# Patient Record
Sex: MALE | Race: WHITE | NOT HISPANIC OR LATINO | Employment: OTHER | URBAN - METROPOLITAN AREA
[De-identification: names, ages, dates, MRNs, and addresses within clinical notes are randomized per-mention and may not be internally consistent; named-entity substitution may affect disease eponyms.]

---

## 2021-08-11 ENCOUNTER — APPOINTMENT (EMERGENCY)
Dept: RADIOLOGY | Facility: HOSPITAL | Age: 71
End: 2021-08-11
Payer: COMMERCIAL

## 2021-08-11 ENCOUNTER — HOSPITAL ENCOUNTER (EMERGENCY)
Facility: HOSPITAL | Age: 71
Discharge: LEFT BEFORE DISCHARGE | End: 2021-08-11
Attending: EMERGENCY MEDICINE
Payer: COMMERCIAL

## 2021-08-11 VITALS
RESPIRATION RATE: 15 BRPM | OXYGEN SATURATION: 91 % | TEMPERATURE: 99 F | WEIGHT: 185 LBS | DIASTOLIC BLOOD PRESSURE: 94 MMHG | HEART RATE: 92 BPM | SYSTOLIC BLOOD PRESSURE: 175 MMHG

## 2021-08-11 DIAGNOSIS — R53.1 GENERALIZED WEAKNESS: Primary | ICD-10-CM

## 2021-08-11 DIAGNOSIS — E86.0 DEHYDRATION: ICD-10-CM

## 2021-08-11 LAB
ALBUMIN SERPL BCP-MCNC: 3.5 G/DL (ref 3.5–5)
ALP SERPL-CCNC: 118 U/L (ref 46–116)
ALT SERPL W P-5'-P-CCNC: 101 U/L (ref 12–78)
ANION GAP SERPL CALCULATED.3IONS-SCNC: 14 MMOL/L (ref 4–13)
AST SERPL W P-5'-P-CCNC: 46 U/L (ref 5–45)
ATRIAL RATE: 94 BPM
ATRIAL RATE: 95 BPM
BACTERIA UR QL AUTO: NORMAL /HPF
BASOPHILS # BLD AUTO: 0.1 THOUSANDS/ΜL (ref 0–0.1)
BASOPHILS NFR BLD AUTO: 1 % (ref 0–1)
BILIRUB SERPL-MCNC: 0.87 MG/DL (ref 0.2–1)
BILIRUB UR QL STRIP: NEGATIVE
BUN SERPL-MCNC: 33 MG/DL (ref 5–25)
CALCIUM SERPL-MCNC: 9.1 MG/DL (ref 8.3–10.1)
CHLORIDE SERPL-SCNC: 94 MMOL/L (ref 100–108)
CLARITY UR: CLEAR
CO2 SERPL-SCNC: 20 MMOL/L (ref 21–32)
COLOR UR: YELLOW
CREAT SERPL-MCNC: 2.1 MG/DL (ref 0.6–1.3)
EOSINOPHIL # BLD AUTO: 0.14 THOUSAND/ΜL (ref 0–0.61)
EOSINOPHIL NFR BLD AUTO: 2 % (ref 0–6)
ERYTHROCYTE [DISTWIDTH] IN BLOOD BY AUTOMATED COUNT: 12.6 % (ref 11.6–15.1)
GFR SERPL CREATININE-BSD FRML MDRD: 31 ML/MIN/1.73SQ M
GLUCOSE SERPL-MCNC: 460 MG/DL (ref 65–140)
GLUCOSE SERPL-MCNC: 466 MG/DL (ref 65–140)
GLUCOSE UR STRIP-MCNC: ABNORMAL MG/DL
HCT VFR BLD AUTO: 42.9 % (ref 36.5–49.3)
HGB BLD-MCNC: 15.2 G/DL (ref 12–17)
HGB UR QL STRIP.AUTO: NEGATIVE
HYALINE CASTS #/AREA URNS LPF: NORMAL /LPF
IMM GRANULOCYTES # BLD AUTO: 0.07 THOUSAND/UL (ref 0–0.2)
IMM GRANULOCYTES NFR BLD AUTO: 1 % (ref 0–2)
KETONES UR STRIP-MCNC: NEGATIVE MG/DL
LEUKOCYTE ESTERASE UR QL STRIP: ABNORMAL
LYMPHOCYTES # BLD AUTO: 2.28 THOUSANDS/ΜL (ref 0.6–4.47)
LYMPHOCYTES NFR BLD AUTO: 25 % (ref 14–44)
MCH RBC QN AUTO: 32 PG (ref 26.8–34.3)
MCHC RBC AUTO-ENTMCNC: 35.4 G/DL (ref 31.4–37.4)
MCV RBC AUTO: 90 FL (ref 82–98)
MONOCYTES # BLD AUTO: 0.5 THOUSAND/ΜL (ref 0.17–1.22)
MONOCYTES NFR BLD AUTO: 5 % (ref 4–12)
NEUTROPHILS # BLD AUTO: 6.13 THOUSANDS/ΜL (ref 1.85–7.62)
NEUTS SEG NFR BLD AUTO: 66 % (ref 43–75)
NITRITE UR QL STRIP: NEGATIVE
NON-SQ EPI CELLS URNS QL MICRO: NORMAL /HPF
NRBC BLD AUTO-RTO: 0 /100 WBCS
P AXIS: 80 DEGREES
P AXIS: 86 DEGREES
PH UR STRIP.AUTO: 5.5 [PH] (ref 4.5–8)
PLATELET # BLD AUTO: 290 THOUSANDS/UL (ref 149–390)
PMV BLD AUTO: 10.1 FL (ref 8.9–12.7)
POTASSIUM SERPL-SCNC: 4.2 MMOL/L (ref 3.5–5.3)
PR INTERVAL: 202 MS
PR INTERVAL: 204 MS
PROT SERPL-MCNC: 7.5 G/DL (ref 6.4–8.2)
PROT UR STRIP-MCNC: ABNORMAL MG/DL
QRS AXIS: -69 DEGREES
QRS AXIS: -73 DEGREES
QRSD INTERVAL: 118 MS
QRSD INTERVAL: 120 MS
QT INTERVAL: 376 MS
QT INTERVAL: 378 MS
QTC INTERVAL: 472 MS
QTC INTERVAL: 472 MS
RBC # BLD AUTO: 4.75 MILLION/UL (ref 3.88–5.62)
RBC #/AREA URNS AUTO: NORMAL /HPF
SODIUM SERPL-SCNC: 128 MMOL/L (ref 136–145)
SP GR UR STRIP.AUTO: 1.01 (ref 1–1.03)
T WAVE AXIS: 49 DEGREES
T WAVE AXIS: 53 DEGREES
TROPONIN I SERPL-MCNC: <0.02 NG/ML
UROBILINOGEN UR QL STRIP.AUTO: 0.2 E.U./DL
VENTRICULAR RATE: 94 BPM
VENTRICULAR RATE: 95 BPM
WBC # BLD AUTO: 9.22 THOUSAND/UL (ref 4.31–10.16)
WBC #/AREA URNS AUTO: NORMAL /HPF

## 2021-08-11 PROCEDURE — 84484 ASSAY OF TROPONIN QUANT: CPT | Performed by: EMERGENCY MEDICINE

## 2021-08-11 PROCEDURE — 99285 EMERGENCY DEPT VISIT HI MDM: CPT

## 2021-08-11 PROCEDURE — 80053 COMPREHEN METABOLIC PANEL: CPT

## 2021-08-11 PROCEDURE — 71046 X-RAY EXAM CHEST 2 VIEWS: CPT

## 2021-08-11 PROCEDURE — 93010 ELECTROCARDIOGRAM REPORT: CPT | Performed by: INTERNAL MEDICINE

## 2021-08-11 PROCEDURE — 81001 URINALYSIS AUTO W/SCOPE: CPT

## 2021-08-11 PROCEDURE — 96360 HYDRATION IV INFUSION INIT: CPT

## 2021-08-11 PROCEDURE — 93005 ELECTROCARDIOGRAM TRACING: CPT

## 2021-08-11 PROCEDURE — 96361 HYDRATE IV INFUSION ADD-ON: CPT

## 2021-08-11 PROCEDURE — 99285 EMERGENCY DEPT VISIT HI MDM: CPT | Performed by: EMERGENCY MEDICINE

## 2021-08-11 PROCEDURE — 85025 COMPLETE CBC W/AUTO DIFF WBC: CPT

## 2021-08-11 PROCEDURE — 36415 COLL VENOUS BLD VENIPUNCTURE: CPT

## 2021-08-11 PROCEDURE — 82948 REAGENT STRIP/BLOOD GLUCOSE: CPT

## 2021-08-11 RX ORDER — NEBIVOLOL 10 MG/1
10 TABLET ORAL DAILY
COMMUNITY

## 2021-08-11 RX ADMIN — SODIUM CHLORIDE 1000 ML: 0.9 INJECTION, SOLUTION INTRAVENOUS at 17:01

## 2021-08-11 NOTE — ED PROVIDER NOTES
History  Chief Complaint   Patient presents with    Weakness - Generalized     Patient was at [facility] and started to feel weak and dizzy  Patient denies any dizziness now but continues to feel week  70M PMH CAD x2 stents, type 2 diabetes presents to the emergency department after an episode of lightheadedness and diaphoresis  He reports he is at [facility] around 3:00 p m , had some ice cream, and then walked up a hill  While walking up hill he became sweaty, generally weak, lightheaded, and was dry heaving without emesis  He denied chest pain, palpitations, shortness of breath, or syncope  He was brought to the emergency department by ambulance  On arrival to the emergency department his fingerstick sugar was 460  Currently most of his symptoms have resolved and he only feels sweaty and a mild headache  He reports this morning his sugar was 240 which is typical for him and he took 20 units of insulin Humalog at the time  Prior to Admission Medications   Prescriptions Last Dose Informant Patient Reported? Taking? LISINOPRIL PO   Yes Yes   Sig: Take 50 mg by mouth daily   nebivolol (BYSTOLIC) 20 MG tablet   Yes Yes   Sig: Take 20 mg by mouth daily      Facility-Administered Medications: None       Past Medical History:   Diagnosis Date    Hypertension        History reviewed  No pertinent surgical history  History reviewed  No pertinent family history  I have reviewed and agree with the history as documented  E-Cigarette/Vaping     E-Cigarette/Vaping Substances     Social History     Tobacco Use    Smoking status: Never Smoker    Smokeless tobacco: Never Used   Substance Use Topics    Alcohol use: Yes     Comment: occasionally    Drug use: Not Currently        Review of Systems   Constitutional: Positive for diaphoresis  Respiratory: Negative for shortness of breath  Cardiovascular: Negative for chest pain and palpitations     Gastrointestinal: Positive for nausea (Resolved) and vomiting (Dry heaving, resolved)  Negative for abdominal pain and diarrhea  Genitourinary: Negative for dysuria  Musculoskeletal: Negative for myalgias  Neurological: Positive for weakness (Generalized) and headaches (Mild)  Negative for dizziness, syncope, light-headedness (Earlier but resolved) and numbness  Psychiatric/Behavioral: Negative for confusion  Physical Exam  ED Triage Vitals [08/11/21 1623]   Temperature Pulse Respirations Blood Pressure SpO2   99 °F (37 2 °C) 104 20 147/96 96 %      Temp Source Heart Rate Source Patient Position - Orthostatic VS BP Location FiO2 (%)   Oral Monitor Lying Right arm --      Pain Score       --             Orthostatic Vital Signs  Vitals:    08/11/21 1623 08/11/21 1715 08/11/21 1900   BP: 147/96 152/87 (!) 175/94   Pulse: 104 100 92   Patient Position - Orthostatic VS: Lying Lying Lying       Physical Exam  Vitals and nursing note reviewed  Constitutional:       General: He is not in acute distress  Appearance: Normal appearance  HENT:      Head: Normocephalic and atraumatic  Mouth/Throat:      Mouth: Mucous membranes are dry  Eyes:      Conjunctiva/sclera: Conjunctivae normal       Pupils: Pupils are equal, round, and reactive to light  Cardiovascular:      Rate and Rhythm: Normal rate and regular rhythm  Heart sounds: Normal heart sounds  No murmur heard  Pulmonary:      Effort: Pulmonary effort is normal  No respiratory distress  Breath sounds: Normal breath sounds  No rhonchi or rales  Abdominal:      General: Abdomen is flat  Bowel sounds are normal  There is no distension  Palpations: Abdomen is soft  Tenderness: There is no abdominal tenderness  Skin:     General: Skin is warm and dry  Neurological:      General: No focal deficit present  Mental Status: He is alert     Psychiatric:         Mood and Affect: Mood normal          ED Medications  Medications   sodium chloride 0 9 % bolus 1,000 mL (0 mL Intravenous Stopped 8/11/21 1900)       Diagnostic Studies  Results Reviewed     Procedure Component Value Units Date/Time    Comprehensive metabolic panel [[id number]]  (Abnormal) Collected: 08/11/21 1701    Lab Status: Final result Specimen: Blood from Arm, Left Updated: 08/11/21 1840     Sodium 128 mmol/L      Potassium 4 2 mmol/L      Chloride 94 mmol/L      CO2 20 mmol/L      ANION GAP 14 mmol/L      BUN 33 mg/dL      Creatinine 2 10 mg/dL      Glucose 466 mg/dL      Calcium 9 1 mg/dL      AST 46 U/L       U/L      Alkaline Phosphatase 118 U/L      Total Protein 7 5 g/dL      Albumin 3 5 g/dL      Total Bilirubin 0 87 mg/dL      eGFR 31 ml/min/1 73sq m     Narrative:      [place] guidelines for Chronic Kidney Disease (CKD):     Stage 1 with normal or high GFR (GFR > 90 mL/min/1 73 square meters)    Stage 2 Mild CKD (GFR = 60-89 mL/min/1 73 square meters)    Stage 3A Moderate CKD (GFR = 45-59 mL/min/1 73 square meters)    Stage 3B Moderate CKD (GFR = 30-44 mL/min/1 73 square meters)    Stage 4 Severe CKD (GFR = 15-29 mL/min/1 73 square meters)    Stage 5 End Stage CKD (GFR <15 mL/min/1 73 square meters)  Note: GFR calculation is accurate only with a steady state creatinine    Troponin I [[id number]]  (Normal) Collected: 08/11/21 1721    Lab Status: Final result Specimen: Blood from Arm, Left Updated: 08/11/21 1818     Troponin I <0 02 ng/mL     Urine Microscopic [[id number]]  (Normal) Collected: 08/11/21 1749    Lab Status: Final result Specimen: Urine, Clean Catch Updated: 08/11/21 1803     RBC, UA None Seen /hpf      WBC, UA None Seen /hpf      Epithelial Cells None Seen /hpf      Bacteria, UA None Seen /hpf      Hyaline Casts, UA None Seen /lpf     Urine Macroscopic, POC [[id number]]  (Abnormal) Collected: 08/11/21 1749    Lab Status: Final result Specimen: Urine Updated: 08/11/21 1751     Color, UA Yellow     Clarity, UA Clear     pH, UA 5 5     Leukocytes, UA Elevated glucose may cause decreased leukocyte values   See urine microscopic for [facility] result/     Nitrite, UA Negative     Protein, UA 30 (1+) mg/dl      Glucose, UA >=1000 (1%) mg/dl      Ketones, UA Negative mg/dl      Urobilinogen, UA 0 2 E U /dl      Bilirubin, UA Negative     Blood, UA Negative     Specific Gravity, UA 1 015    Narrative:      CLINITEK RESULT    CBC and differential [[id number]] Collected: 08/11/21 1701    Lab Status: Final result Specimen: Blood from Arm, Left Updated: 08/11/21 1714     WBC 9 22 Thousand/uL      RBC 4 75 Million/uL      Hemoglobin 15 2 g/dL      Hematocrit 42 9 %      MCV 90 fL      MCH 32 0 pg      MCHC 35 4 g/dL      RDW 12 6 %      MPV 10 1 fL      Platelets 969 Thousands/uL      nRBC 0 /100 WBCs      Neutrophils Relative 66 %      Immat GRANS % 1 %      Lymphocytes Relative 25 %      Monocytes Relative 5 %      Eosinophils Relative 2 %      Basophils Relative 1 %      Neutrophils Absolute 6 13 Thousands/µL      Immature Grans Absolute 0 07 Thousand/uL      Lymphocytes Absolute 2 28 Thousands/µL      Monocytes Absolute 0 50 Thousand/µL      Eosinophils Absolute 0 14 Thousand/µL      Basophils Absolute 0 10 Thousands/µL     Fingerstick Glucose (POCT) [[id number]]  (Abnormal) Collected: 08/11/21 1622    Lab Status: Final result Updated: 08/11/21 1627     POC Glucose 460 mg/dl                  XR chest 2 views    (Results Pending)         Procedures  ECG 12 Lead Documentation Only    Date/Time: 8/11/2021 5:35 PM  Performed by: Ludwig Almanzar MD  Authorized by: Ludwig Almanzar MD     ECG reviewed by me, the ED Provider: yes    Patient location:  ED  Previous ECG:     Previous ECG:  Unavailable  Interpretation:     Interpretation: abnormal    Rate:     ECG rate:  95    ECG rate assessment: normal    Rhythm:     Rhythm: sinus rhythm    Ectopy:     Ectopy: none    QRS:     QRS axis:  Left    QRS intervals:  Normal  ST segments:     ST segments:  Normal  T waves: T waves: non-specific and inverted      Inverted:  AVR and V1  Other findings:     Other findings comment:  RBB pattern, LAFB          ED Course               Identification of Seniors at Risk      Most Recent Value   (ISAR) Identification of Seniors at Risk   Before the illness or injury that brought you to the Emergency, did you need someone to help you on a regular basis? 0 Filed at: 08/11/2021 1624   In the last 24 hours, have you needed more help than usual?  0 Filed at: 08/11/2021 1624   Have you been hospitalized for one or more nights during the past 6 months? 0 Filed at: 08/11/2021 1624   In general, do you see well?  0 Filed at: 08/11/2021 1624   In general, do you have serious problems with your memory? 0 Filed at: 08/11/2021 1624   Do you take more than three different medications every day? 1 Filed at: 08/11/2021 1624   ISAR Score  1 Filed at: 08/11/2021 1624                    SBIRT 20yo+      Most Recent Value   SBIRT (25 yo +)   In order to provide better care to our patients, we are screening all of our patients for alcohol and drug use  Would it be okay to ask you these screening questions? No Filed at: 08/11/2021 1625                MDM  Number of Diagnoses or Management Options  Dehydration  Generalized weakness  Diagnosis management comments: 70M PMH CAD x2 stents, type 2 diabetes presents to the emergency department after an episode of lightheadedness and diaphoresis, found to have blood glucose of 460  Differential including hyperosmolar hyperglycemic nonketotic state, cardiac syncope, heat stroke  On arrival patient with mild headache and sweating, other symptoms resolved  Denied chest pain, shortness of breath, loss of consciousness  Workup including vital signs, physical exam, EKG, troponin, CXR, CMP, CBC, UA  EKG without acute ischemic changes, repeat EKG without changes, troponin negative, chest x-ray without acute abnormalities    Found to have NATHANIEL, elevated glucose, hypoxia with oxygen saturation in the high 80s to low 90s  Treatment including IV fluids  Patient declined further evaluation or admission and chose to leave AMA  Risks of leaving AMA discussed, patient understood and signed AMA form  Disposition  Final diagnoses:   Generalized weakness   Dehydration     Time reflects when diagnosis was documented in both MDM as applicable and the Disposition within this note     Time User Action Codes Description Comment    8/11/2021  7:27 PM Donny Salazar Add [R53 1] Weakness     8/11/2021  7:27 PM Francesca Salazar Add [R53 1] Generalized weakness     8/11/2021  7:27 PM Donny Salazar Modify [R53 1] Generalized weakness     8/11/2021  7:27 PM Francesca Salazar Remove [R53 1] Weakness     8/11/2021  7:27 PM Arnolder Goldberg Add [E86 0] Dehydration       ED Disposition     ED Disposition Condition Date/Time Comment    AMA  Wed Aug 11, 2021  7:03 PM Date: 8/11/2021  Patient: Adriel Knutson  Admitted: 8/11/2021  4:17 PM  Attending Provider: Jose Riojas MD    Adriel Knutson or his authorized caregiver has made the decision for the patient to leave the emergency department against the advi ce of the emergency department staff  He or his authorized caregiver has been informed and understands the inherent risks, including death, disability, worsening hypoxia, worsening kidney injury, or arrhythmia  He or his authorized caregiver has dec ided to accept the responsibility for this decision  Adriel Knutson and all necessary parties have been advised that he may return for further evaluation or treatment  His condition at time of discharge was stable    Adriel Knutson had current vital s igns as follows:  /87 (BP Location: Right arm)   Pulse 100   Temp 99 °F (37 2 °C) (Oral)   Resp 20   Wt 83 9 kg (185 lb)         Follow-up Information    None         Discharge Medication List as of 8/11/2021  7:03 PM      CONTINUE these medications which have NOT CHANGED    Details   LISINOPRIL PO Take 50 mg by mouth daily, Historical Med      nebivolol (BYSTOLIC) 20 MG tablet Take 20 mg by mouth daily, Historical Med           No discharge procedures on file  PDMP Review     None           ED Provider  Attending physically available and evaluated Tutu Esqueda I managed the patient along with the ED Attending      Electronically Signed by         Christina Vick MD  08/12/21 1891

## 2021-08-11 NOTE — DISCHARGE INSTRUCTIONS
You were evaluated in the emergency department for an episode of weakness and lightheadedness  You may return to the emergency department at any time if you would like further evaluation  Return to the emergency department immediately if having altered mental status, passing out, chest pain, shortness of breath, abdominal pain, worsened vomiting, inability to tolerate foods, or any other symptoms that concern you

## 2021-09-01 ENCOUNTER — HOSPITAL ENCOUNTER (OUTPATIENT)
Facility: HOSPITAL | Age: 71
Setting detail: HOSPITAL OUTPATIENT SURGERY
End: 2021-09-01
Attending: ORTHOPAEDIC SURGERY | Admitting: ORTHOPAEDIC SURGERY
Payer: COMMERCIAL

## 2021-09-13 ENCOUNTER — APPOINTMENT (OUTPATIENT)
Dept: PREADMISSION TESTING | Facility: HOSPITAL | Age: 71
End: 2021-09-13
Attending: ORTHOPAEDIC SURGERY
Payer: COMMERCIAL

## 2021-09-13 ENCOUNTER — TRANSCRIBE ORDERS (OUTPATIENT)
Dept: LAB | Facility: HOSPITAL | Age: 71
End: 2021-09-13
Payer: COMMERCIAL

## 2021-09-13 VITALS
OXYGEN SATURATION: 98 % | WEIGHT: 182.8 LBS | SYSTOLIC BLOOD PRESSURE: 150 MMHG | DIASTOLIC BLOOD PRESSURE: 98 MMHG | BODY MASS INDEX: 26.17 KG/M2 | HEART RATE: 93 BPM | HEIGHT: 70 IN | RESPIRATION RATE: 18 BRPM | TEMPERATURE: 96.2 F

## 2021-09-13 DIAGNOSIS — M54.12 RADICULOPATHY, CERVICAL REGION: ICD-10-CM

## 2021-09-13 DIAGNOSIS — I10 ESSENTIAL HYPERTENSION: ICD-10-CM

## 2021-09-13 DIAGNOSIS — Z01.818 ENCOUNTER FOR OTHER PREPROCEDURAL EXAMINATION: ICD-10-CM

## 2021-09-13 DIAGNOSIS — Z01.818 ENCOUNTER FOR OTHER PREPROCEDURAL EXAMINATION: Primary | ICD-10-CM

## 2021-09-13 DIAGNOSIS — Z11.59 ENCOUNTER FOR SCREENING FOR OTHER VIRAL DISEASES: ICD-10-CM

## 2021-09-13 DIAGNOSIS — M48.02 SPINAL STENOSIS, CERVICAL REGION: ICD-10-CM

## 2021-09-13 DIAGNOSIS — E11.69 TYPE 2 DIABETES MELLITUS WITH OTHER SPECIFIED COMPLICATION, WITH LONG-TERM CURRENT USE OF INSULIN: ICD-10-CM

## 2021-09-13 DIAGNOSIS — M54.12 RADICULOPATHY, CERVICAL REGION: Primary | ICD-10-CM

## 2021-09-13 DIAGNOSIS — Z79.4 TYPE 2 DIABETES MELLITUS WITH OTHER SPECIFIED COMPLICATION, WITH LONG-TERM CURRENT USE OF INSULIN: ICD-10-CM

## 2021-09-13 PROBLEM — Z86.73 HX OF TRANSIENT ISCHEMIC ATTACK (TIA): Status: ACTIVE | Noted: 2021-09-13

## 2021-09-13 PROBLEM — E11.9 TYPE 2 DIABETES MELLITUS, WITH LONG-TERM CURRENT USE OF INSULIN (CMS/HCC): Status: ACTIVE | Noted: 2021-09-13

## 2021-09-13 PROBLEM — Z86.79 HISTORY OF CORONARY ARTERY DISEASE: Status: ACTIVE | Noted: 2021-09-13

## 2021-09-13 PROBLEM — Z87.448 HX OF CHRONIC KIDNEY DISEASE: Status: ACTIVE | Noted: 2021-09-13

## 2021-09-13 LAB
ABO + RH BLD: NORMAL
ALBUMIN SERPL-MCNC: 4.5 G/DL (ref 3.4–5)
ALP SERPL-CCNC: 69 IU/L (ref 35–126)
ALT SERPL-CCNC: 15 IU/L (ref 16–63)
ANION GAP SERPL CALC-SCNC: 10 MEQ/L (ref 3–15)
AST SERPL-CCNC: 14 IU/L (ref 15–41)
BASOPHILS # BLD: 0.09 K/UL (ref 0.01–0.1)
BASOPHILS NFR BLD: 1 %
BILIRUB SERPL-MCNC: 1 MG/DL (ref 0.3–1.2)
BLD GP AB SCN SERPL QL: NEGATIVE
BLOOD BANK CMNT PATIENT-IMP: NORMAL
BUN SERPL-MCNC: 27 MG/DL (ref 8–20)
CALCIUM SERPL-MCNC: 9.9 MG/DL (ref 8.9–10.3)
CHLORIDE SERPL-SCNC: 95 MEQ/L (ref 98–109)
CO2 SERPL-SCNC: 28 MEQ/L (ref 22–32)
CREAT SERPL-MCNC: 1.4 MG/DL (ref 0.8–1.3)
D AG BLD QL: POSITIVE
DIFFERENTIAL METHOD BLD: ABNORMAL
EOSINOPHIL # BLD: 0.22 K/UL (ref 0.04–0.54)
EOSINOPHIL NFR BLD: 2.4 %
ERYTHROCYTE [DISTWIDTH] IN BLOOD BY AUTOMATED COUNT: 13 % (ref 11.6–14.4)
EST. AVERAGE GLUCOSE BLD GHB EST-MCNC: 258 MG/DL
GFR SERPL CREATININE-BSD FRML MDRD: 50.1 ML/MIN/1.73M*2
GLUCOSE SERPL-MCNC: 336 MG/DL (ref 70–99)
HBA1C MFR BLD HPLC: 10.6 %
HCT VFR BLDCO AUTO: 43 % (ref 40.1–51)
HGB BLD-MCNC: 14.6 G/DL (ref 13.7–17.5)
IMM GRANULOCYTES # BLD AUTO: 0.1 K/UL (ref 0–0.08)
IMM GRANULOCYTES NFR BLD AUTO: 1.1 %
LABORATORY COMMENT REPORT: NORMAL
LYMPHOCYTES # BLD: 1.58 K/UL (ref 1.2–3.5)
LYMPHOCYTES NFR BLD: 17.1 %
MCH RBC QN AUTO: 31.5 PG (ref 28–33.2)
MCHC RBC AUTO-ENTMCNC: 34 G/DL (ref 32.2–36.5)
MCV RBC AUTO: 92.9 FL (ref 83–98)
MONOCYTES # BLD: 0.49 K/UL (ref 0.3–1)
MONOCYTES NFR BLD: 5.3 %
NEUTROPHILS # BLD: 6.74 K/UL (ref 1.7–7)
NEUTS SEG NFR BLD: 73.1 %
NRBC BLD-RTO: 0 %
PDW BLD AUTO: 9.7 FL (ref 9.4–12.4)
PLATELET # BLD AUTO: 290 K/UL (ref 150–350)
POTASSIUM SERPL-SCNC: 4.6 MEQ/L (ref 3.6–5.1)
PROT SERPL-MCNC: 7.3 G/DL (ref 6–8.2)
RBC # BLD AUTO: 4.63 M/UL (ref 4.5–5.8)
SARS-COV-2 RNA RESP QL NAA+PROBE: NEGATIVE
SODIUM SERPL-SCNC: 133 MEQ/L (ref 136–144)
SPECIMEN EXP DATE BLD: NORMAL
WBC # BLD AUTO: 9.22 K/UL (ref 3.8–10.5)

## 2021-09-13 PROCEDURE — 86901 BLOOD TYPING SEROLOGIC RH(D): CPT

## 2021-09-13 PROCEDURE — 36415 COLL VENOUS BLD VENIPUNCTURE: CPT

## 2021-09-13 PROCEDURE — 83036 HEMOGLOBIN GLYCOSYLATED A1C: CPT | Performed by: HOSPITALIST

## 2021-09-13 PROCEDURE — U0003 INFECTIOUS AGENT DETECTION BY NUCLEIC ACID (DNA OR RNA); SEVERE ACUTE RESPIRATORY SYNDROME CORONAVIRUS 2 (SARS-COV-2) (CORONAVIRUS DISEASE [COVID-19]), AMPLIFIED PROBE TECHNIQUE, MAKING USE OF HIGH THROUGHPUT TECHNOLOGIES AS DESCRIBED BY CMS-2020-01-R: HCPCS

## 2021-09-13 PROCEDURE — 99203 OFFICE O/P NEW LOW 30 MIN: CPT | Performed by: HOSPITALIST

## 2021-09-13 PROCEDURE — 80053 COMPREHEN METABOLIC PANEL: CPT

## 2021-09-13 PROCEDURE — 85025 COMPLETE CBC W/AUTO DIFF WBC: CPT

## 2021-09-13 RX ORDER — CALCIUM CARBONATE/VITAMIN D3 500-10/5ML
400 LIQUID (ML) ORAL 2 TIMES DAILY
COMMUNITY

## 2021-09-13 RX ORDER — INSULIN ASPART 100 [IU]/ML
10 INJECTION, SOLUTION INTRAVENOUS; SUBCUTANEOUS
COMMUNITY

## 2021-09-13 RX ORDER — SIMVASTATIN 20 MG/1
20 TABLET, FILM COATED ORAL NIGHTLY
COMMUNITY

## 2021-09-13 RX ORDER — INSULIN GLARGINE 100 [IU]/ML
22 INJECTION, SOLUTION SUBCUTANEOUS NIGHTLY
COMMUNITY

## 2021-09-13 RX ORDER — LOSARTAN POTASSIUM 50 MG/1
50 TABLET ORAL NIGHTLY
COMMUNITY

## 2021-09-13 RX ORDER — EZETIMIBE 10 MG/1
10 TABLET ORAL NIGHTLY
COMMUNITY

## 2021-09-13 RX ORDER — ASPIRIN 81 MG/1
81 TABLET ORAL NIGHTLY
COMMUNITY

## 2021-09-13 RX ORDER — NEBIVOLOL 10 MG/1
10 TABLET ORAL EVERY MORNING
COMMUNITY

## 2021-09-13 RX ORDER — HYDROCODONE BITARTRATE AND ACETAMINOPHEN 5; 325 MG/1; MG/1
1 TABLET ORAL EVERY 6 HOURS PRN
COMMUNITY

## 2021-09-13 RX ORDER — POTASSIUM CHLORIDE 20 MEQ/1
20 TABLET, EXTENDED RELEASE ORAL 2 TIMES DAILY
COMMUNITY

## 2021-09-13 RX ORDER — FLUTICASONE PROPIONATE 50 MCG
1 SPRAY, SUSPENSION (ML) NASAL DAILY PRN
COMMUNITY

## 2021-09-13 RX ORDER — ICOSAPENT ETHYL 1 G/1
1 CAPSULE ORAL 3 TIMES DAILY
COMMUNITY

## 2021-09-13 ASSESSMENT — PAIN SCALES - GENERAL: PAINLEVEL: 8

## 2021-09-13 NOTE — ASSESSMENT & PLAN NOTE
On losartan and Bystolic  Patient instructed to hold his losartan the morning of surgery  Continue Bystolic pre and postop.  Monitor blood pressures -mildly elevated this visit

## 2021-09-13 NOTE — CONSULTS
[facility] Medicine Service -  Pre-Operative Consultation       Patient Name: Jake Conde  Referring Surgeon: Dr. Yogesh Ponce    Reason for Referral: Pre-Operative Evaluation  Surgical Procedure: C3-6 Anterior Decompression, Instrumented Fusion, Autograft, Allograft, Neuromonitoring  Operative Date: 9/16/2021  Other Providers:      PCP: Danielle Philippe DO          HISTORY OF PRESENT ILLNESS      Jake Conde is a 70 y.o. male presenting today to the [facility] at [facility] for pre-operative evaluation prior to planned surgery.    Complains of significant neck pain, paresthesias since a traumatic injury in May 2021. No relief with conservative measures so opting for surgery    In regards to medical history:      The patient denies any current or recent chest pain or pressure, dyspnea, cough, sputum, fevers, chills, abdominal pain, nausea, vomiting, diarrhea or other symptoms.     Functionally, the patient is able to ascend a flight or so of stairs with no dyspnea or chest pain. No urinary symptoms, no BRBPR or melena    The patient denies, on specific questioning, the following:  No history of MI, arrhythmia,or CHF. Hx of CAD  No history of LEXII.  No history of DVT/PE.  No history of COPD.  Hx of TIA  history of DM.   history of CKD.     PAST MEDICAL AND SURGICAL HISTORY      Past Medical History:   Diagnosis Date   • Cervical disc disorder    • Chronic kidney disease    • Coronary artery disease     LAD stents   • COVID-19 2020    not hospitalized-mild sx   • COVID-19 vaccine series completed 2021    x 2- Pfizer   • Environmental and seasonal allergies    • Gout    • Hypertension    • Kidney stones    • Lipid disorder    • Rotator cuff tear     right shoulder- d/t fall 6/2021   • TIA (transient ischemic attack) 2016   • Type 2 diabetes mellitus (CMS/HCC)        Past Surgical History:   Procedure Laterality Date   • ANKLE ARTHROSCOPY Right    • CARPAL  TUNNEL RELEASE Bilateral    • CATARACT EXTRACTION W/  INTRAOCULAR LENS IMPLANT Bilateral    • CHOLECYSTECTOMY     • CORONARY ANGIOPLASTY WITH STENT PLACEMENT  2008    LAD   • ELBOW SURGERY Bilateral     cubital release   • KNEE ARTHROSCOPY Bilateral    • LITHOTRIPSY      x 2   • NASAL FRACTURE SURGERY  06/2021    & orbital fracture (s/p fall)   • SINUS SURGERY     • THUMB ARTHROSCOPY Bilateral    • TONSILLECTOMY     • TOTAL KNEE ARTHROPLASTY Left 2018       MEDICATIONS        Current Outpatient Medications:   •  ALLOPURINOL ORAL, Take 400 mg by mouth nightly., Disp: , Rfl:   •  aspirin 81 mg enteric coated tablet, Take 81 mg by mouth nightly., Disp: , Rfl:   •  ezetimibe (ZETIA) 10 mg tablet, Take 10 mg by mouth nightly., Disp: , Rfl:   •  fluticasone propionate (FLONASE) 50 mcg/actuation nasal spray, Administer 1 spray into each nostril daily as needed for rhinitis., Disp: , Rfl:   •  HYDROcodone-acetaminophen (NORCO) 5-325 mg per tablet, Take 1 tablet by mouth every 6 (six) hours as needed for moderate pain (1-2 tabs evry 4-6 hours as needed)., Disp: , Rfl:   •  icosapent ethyL (VASCEPA) 1 gram capsule, Take 1 g by mouth 4 (four) times a day., Disp: , Rfl:   •  insulin aspart U-100 (NovoLOG) 100 unit/mL (3 mL) pen, Inject 10 Units under the skin 3 (three) times a day before meals. SSI -checks BG before meals, Disp: , Rfl:   •  insulin glargine U-100 (LANTUS SOLOSTAR/BASAGLAR) 100 unit/mL (3 mL) pen, Inject 22 Units under the skin nightly., Disp: , Rfl:   •  losartan (COZAAR) 50 mg tablet, Take 50 mg by mouth nightly., Disp: , Rfl:   •  magnesium oxide 400 mg magnesium capsule, Take 400 mg by mouth 2 (two) times a day., Disp: , Rfl:   •  nebivoloL (BYSTOLIC) 10 mg tablet, Take 10 mg by mouth every morning., Disp: , Rfl:   •  potassium chloride (KLOR-CON) 20 mEq CR tablet, Take 20 mEq by mouth 2 (two) times a day., Disp: , Rfl:   •  simvastatin (ZOCOR) 20 mg tablet, Take 20 mg by mouth nightly., Disp: , Rfl:  "    ALLERGIES      Nitroglycerin    FAMILY HISTORY      family history is not on file.    Denies any prior known family history of DVTs/PEs/clotting disorder    SOCIAL HISTORY      Social History     Tobacco Use   • Smoking status: Former Smoker     Packs/day: 1.50     Years: 5.00     Pack years: 7.50     Types: Cigarettes     Quit date:      Years since quittin.7   • Smokeless tobacco: Never Used   Vaping Use   • Vaping Use: Never used   Substance Use Topics   • Alcohol use: Yes     Comment: rare   • Drug use: Never       REVIEW OF SYSTEMS      All other systems reviewed and negative except as noted in HPI    PHYSICAL EXAMINATION      Visit Vitals  BP (!) 150/98 (BP Location: Left upper arm, Patient Position: Sitting)   Pulse 93   Temp (!) 35.7 °C (96.2 °F) (Temporal)   Resp 18   Ht 1.778 m (5' 10\")   Wt 82.9 kg (182 lb 12.8 oz)   SpO2 98%   BMI 26.23 kg/m²     Body mass index is 26.23 kg/m².    Physical Exam  Constitutional:       Appearance: Normal appearance.   HENT:      Head: Normocephalic and atraumatic.   Eyes:      Conjunctiva/sclera: Conjunctivae normal.   Neck:      Comments: Limited ROM from pain  Cardiovascular:      Rate and Rhythm: Normal rate and regular rhythm.      Heart sounds: Normal heart sounds.   Pulmonary:      Effort: Pulmonary effort is normal.      Breath sounds: Normal breath sounds.   Abdominal:      General: Bowel sounds are normal. There is no distension.      Palpations: Abdomen is soft.      Tenderness: There is no abdominal tenderness.   Skin:     General: Skin is warm and dry.   Neurological:      Mental Status: He is alert and oriented to person, place, and time.   Psychiatric:         Mood and Affect: Mood normal.         Behavior: Behavior normal.         LABS / EKG        Labs  reviewed    Lab Results   Component Value Date     (L) 2021    K 4.6 2021    CL 95 (L) 2021    BUN 27 (H) 2021    CREATININE 1.4 (H) 2021    WBC 9.22 " "09/13/2021    HGB 14.6 09/13/2021    HCT 43.0 09/13/2021     09/13/2021    ALT 15 (L) 09/13/2021    AST 14 (L) 09/13/2021    HGBA1C 10.6 (H) 09/13/2021         ECG/Telemetry  ECG May 2021: Sinus rhythm with first-degree AV block, bifascicular block, cannot rule out inferior infarct age undetermined.  Per cardiology unchanged compared to previous EKG.    ASSESSMENT AND PLAN         Encounter for other preprocedural examination  Reasonable exercise capacity  Does have a history of CAD s/p PCI/stents, strip TIA, insulin-dependent type 2 diabetes  Seen by his cardiologist for preop cardiovascular evaluation and per cardiology \" no unacceptable risk for planned surgery\"    P:  His A1C is 10.6 which is indicative of poorly controlled diabetes.  This puts him at increased risk of perioperative complications such as but not limited to poor wound healing, risk of infection.  Ideally would recommend deferring surgery until A1c less than 8.    Type 2 diabetes mellitus, with long-term current use of insulin (CMS/LTAC, located within [facility])  A1c - 10.6 which is indicative of poorly controlled diabetes.  on Lantus, NovoLog with meals.  Recommend endocrinology follow-up  Patient instructed to take half his usual Lantus dose the night before surgery and hold his mealtime insulin while n.p.o. on the day of surgery.  SSI, monitor Accu-Cheks postop      History of coronary artery disease  With history of PCI/stents  Cardiology recommending continue aspirin through surgery.  Continue beta-blocker, statin.    Hx of chronic kidney disease  creatinine today - 1.4  Recent Baseline creatinine around 1.8 - 2.0  Avoid nephrotoxins  Ensure adequate hydration luis alfredo-/postop.    Hx of transient ischemic attack (TIA)  Cont aspirin, statin    Essential hypertension  On losartan and Bystolic  Patient instructed to hold his losartan the morning of surgery  Continue Bystolic pre and postop.  Monitor blood pressures -mildly elevated this visit       In regards to " perioperative cardiac risk:  The patient denies any history of CHF and does not have a pre-operative creatinine > 2 mg/dL. Does have a hx of CAD s/p PCI, hx of IDDM, hx of TIA  The Revised Cardiac Risk Index (RCRI) for this patient indicates >5.4% risk.     Further comments:  Resume supplements when OK with surgical team.  I would encourage incentive spirometry to assist with minimizing luis alfredo-operative pulmonary risk.  DVT prophylaxis and timing of such per the discretion of the surgeon.     Please do not hesitate to contact [facility] during the upcoming hospitalization with any questions or concerns.     Tobias Mccurdy MD  9/13/2021

## 2021-09-13 NOTE — ASSESSMENT & PLAN NOTE
A1c - 10.6 which is indicative of poorly controlled diabetes.  on Lantus, NovoLog with meals.  Recommend endocrinology follow-up  Patient instructed to take half his usual Lantus dose the night before surgery and hold his mealtime insulin while n.p.o. on the day of surgery.  SSI, monitor Accu-Cheks postop

## 2021-09-13 NOTE — ASSESSMENT & PLAN NOTE
With history of PCI/stents  Cardiology recommending continue aspirin through surgery.  Continue beta-blocker, statin.

## 2021-09-13 NOTE — ASSESSMENT & PLAN NOTE
creatinine today - 1.4  Recent Baseline creatinine around 1.8 - 2.0  Avoid nephrotoxins  Ensure adequate hydration luis alfredo-/postop.

## 2021-09-13 NOTE — ASSESSMENT & PLAN NOTE
"Reasonable exercise capacity  Does have a history of CAD s/p PCI/stents, strip TIA, insulin-dependent type 2 diabetes  Seen by his cardiologist for preop cardiovascular evaluation and per cardiology \" no unacceptable risk for planned surgery\"    P:  His A1C is 10.6 which is indicative of poorly controlled diabetes.  This puts him at increased risk of perioperative complications such as but not limited to poor wound healing, risk of infection.  Ideally would recommend deferring surgery until A1c less than 8.  "

## 2021-09-13 NOTE — PRE-PROCEDURE INSTRUCTIONS
1. Admissions will call you with your arrival time on Wed 9/15/21 between 2pm - 4pm. For questions about your arrival time, please call [phone].    2. Please report to the [facility] in the [place] on the day of your procedure. Enter the hospital through the [place] [facility] (main entrance at [address], [place]). If you are parking in the [facility], come to the ground floor of the garage and follow signs to the [facility]. Bring your insurance card and photo ID.     3. Please follow these fasting guidelines:  - STOP all solid food 8 hours prior to arrival.   - No more than 12oz of water is permitted and must STOP 2 HOURS prior to arrival to the hospital.     4. Early on the morning of the procedure, please take the following medications listed below with a sip of water, in addition to any medications prescribed by your surgeon:     bystolic  flonase if needed  norco if needed      *NO aspirin, ibuprofen, anti-inflammatories, fish oil or Vitamin E unless ordered by physician.       5. Other instructions: antibacterial shower x 2, brush teeth    6. If you develop a cough, cold, fever, or other symptom prior to the date of the procedure, please report it to your physician immediately.    7. If you need to cancel the procedure for any reason, please contact your physician.    8. Make arrangements to have someone drive you home from the procedure. If you have not arranged for transportation home, your surgery may be cancelled.     9. You may not take public transportation or a cab unless accompanied by a responsible person.    10. You may not drive a car or operate complex or potentially dangerous machinery for 24 hours following anesthesia and/or sedation.    11. If it is medically necessary for you to have a longer stay, you will be informed as soon as the decision is made.    12. Do not wear or bring anything of value to the hospital, including medication or jewelry of  any kind. Do not wear make-up or contact lenses. Do bring your glasses and hearing aid, with a case. If you use a CPAP machine and will be overnight, please bring it with you. If you use any inhalers, please bring them as well.     13. Dress in comfortable clothes.    14. If instructed, please bring a copy of your Advance Directive (Living Will/Durable Power of ) on the day of your procedure.    15. Ensuring your safety at all times is a very important part of our [facility] Culture of Safety. After having surgery and sedation, you are at risk for falling and balance issues. Although you may feel awake, the effects of the medication can last up to 24 hours after anesthesia. If you need to use the bathroom during your recovery period, nursing staff will escort you there and stay with you to ensure your safety.    Pre operative instructions given as per protocol.  Form explained by:     Eusebia Reyes, RN, BSN

## 2021-09-14 NOTE — DISCHARGE INSTRUCTIONS
Yogesh Ponce MD  , Orthopaedic Spine Surgery  [facility]  Alaina Beasley PA-C  Office: [phone]  Fax: [phone]    Anterior Cervical Diskectomy and Fusion (ACDF)  Preoperative, Postoperative and Home Recovery Instructions   Introduction  The purpose of this guide is to provide you and your family with information regarding your medical condition and planned surgery. This information is part of your medical Informed Consent. Please read it and follow the advice carefully. You should retain the guide for future reference and bring it with you to office appointments and to the hospital for reference.  Family Waiting  After surgery, Dr. Ponce will meet with your family in the surgical waiting room unless otherwise arranged. Please have a representative available in the waiting room to gather the family upon completion of surgery.  After Your Operation  Pain  After surgery you may experience pain in the region of the incision. Some neck and arm pain as well as tingling or numbness may also be present. Initially it may be of greater intensity than pre-operatively, but will subside over time as the healing process occurs. This discomfort is caused from surgical retraction of tissue as well as inflammation and swelling of the previously compressed nerves.  Some patients experience a sore throat and swallowing difficulty after general anesthesia and surgery. This is from manipulation of tissue and the presence of the breathing tube for anesthesia. The sore throat usually will subside within a week. The swallowing difficulty may take longer. Using throat lozenges or lemon drops, sipping cool liquids, or sucking ice chips may soothe this pain.  Use of Pain Medication  Narcotic pain medication will be available for pain relief after surgery. Narcotics are very effective for pain relief but may cause other side effects. The possible effects vary among  patients and may include: sleepiness, nausea, constipation, flushing, sweating, and occasionally euphoria or confused feelings. If these occur notify your nurse. For your protection, you will receive narcotic medication only when you request it and if deemed medically appropriate by your physician.  Activity  Feel free to move about in your bed. The nurse or therapist will assist you in getting out of bed for a short walk a few hours after surgery. You will be instructed to be up walking every 2 to 3 hours during the day and evening. As you recover, the nurse will allow you to do this independently once you are steady and feel comfortable.  Early activity after surgery is extremely important to help prevent the complications of prolonged bed rest such as pneumonia and blood clots. It also promotes recovery, relieves muscle stiffness, allows for development of a well-organized scar, and improves your outlook.  Do not start any programs of exercise or physical therapy unless discussed with your doctor.  Diet  Your diet will begin with clear liquids, and be advanced to your normal daily diet as soon as your condition permits. Your IV will be removed as soon as we are reasonably certain it will no longer be required for medications and hydration.  Bowel and Bladder Function  During surgery you may have a catheter (tube) in your bladder to monitor your urine output. Upon its removal you may feel a stinging sensation for 2 to 3 days, which is normal. Some patients may have difficulty urinating after surgery. If this occurs, notify your nurse who may assist you in voiding techniques. This may require placing a catheter in your bladder. After surgery, constipation frequently occurs from inactivity and the side effects of pain medication. Stool softeners and laxatives will be available.  Respiratory Hygiene  Deep breathing is very important after surgery to maintain lung expansion and reduce the risk of pneumonia. You will  be provided with an incentive spirometer and instructed about its use. This device should be used every 15 to 30 minutes during your wakeful hours initially, then every 1 to 2 hours as your  Activity returns to normal. This device is yours to take home. Continue to use it at home for at least 1 week after your discharge.  Smoking is absolutely forbidden. There is clear evidence that smoking dramatically increases your risk of post-operative complications. There is also evidence that smoking adversely effects bone healing and nerve recovery. Second hand smoke also applies, so family members and friends should avoid smoking while around you.  Use of Your Neck Collar  A soft cervical collar will be applied immediately following surgery. It serves as a reminder and keeps your head supported. It also reduces discomfort and facilitates healing. This collar is for comfort only. Feel free to discontinue use after 2-3 days.  The collar should fit snugly, yet comfortably. It should allow only minimal motion. Do not fight the collar; cooperate with it. This will assist in bone healing and minimize neck discomfort and skin irritation.  Home Recovery  Follow-Up Appointment  Patients are generally discharged from the hospital 1 to 2 days after surgery. A follow-up appointment was made for Dr Ponce's office 2 weeks from the date of surgery. At your first follow-up visit, you will be evaluated and the incision will be checked. You will then be seen at 6 weeks, 3 months, 6 months, 1 year and 2 years from surgery. X-rays will be taken at each visit to insure appropriate healing is taking place.   Incision Care  After the 3rd post-operative day, you are encouraged to shower daily. Do not soak your incision. Pat the incision dry. Do not apply any ointments or creams. Cover daily, for the first 5 days, with a clean dressing. A supply will be provided upon discharge. Surgical tapes or Steri-strips may be present to aid in holding the  skin edges together. Allow these to fall off on their own. After five days post-operatively you no longer need to wear a bandage. NO BATHS, HOT TUBS, OR POOLS FOR 6 WEEKS AFTER SURGERY, it will increase your risk of infection.  Inflammation  Please take your temperature every afternoon for the first week after you are discharged from the hospital. Call your physician if:   Your temperature taken by thermometer, is more than 101.5 degrees,   Your incision becomes reddened, swollen or any drainage occurs, or   Your pain increases out of anticipated parameters.  Nutrition  A well-balanced diet is necessary for good healing and recovery. This includes food from the four basic food groups: dairy products, meat, vegetables and fruit. Use of narcotic pain medication and prolonged rest may cause constipation. Drinking plenty of fluids and eating high fiber foods (whole grains, raw fruits and vegetables) will help regain normal bowel function.  Home Pain and Medication  When pressure is relieved from an inflamed, damaged nerve it does not recover instantaneously. The surgical procedure does not heal the nerve, only the body is capable of that. The goal of surgery is to create the best possible environment for the body to heal itself and to prevent further damage. This will take a variable length of time depending on the duration and degree of nerve damage and the bodys own healing abilities. Most of the healing occurs in the first few months. Pain, weakness, or numbness lasting more than six months will likely be permanent.  Everyone has a different pain tolerance that will dictate the amount of pain medication required. A decreased dose and less frequent use of pain medication will occur during your recovery period. A gradual weaning of medications should begin as soon as possible, generally within 2 to 4 weeks. Conservative use of narcotic pain medication is advised. One should try non-narcotic medication, such as  Tylenol and reserve narcotics for more severe pain. While using narcotic pain medication you SHOULD NOT drive. One should try non-narcotic medication, such as Tylenol and reserve narcotics for only the difficult times. Do not take anti-inflammatory medicines such as Celebrex, Ibuprofen, Meloxicam, Advil, Aleve, or Motrin, as these may affect your bone healing for 12 weeks following your surgery.  Narcotics will not be considered for refills on weekends, holidays, or at night.  Home Activity  Your recovery is an essential part of your surgical process. Following these guidelines and the instructions given to you by your physician and nurse will provide you with the best opportunity to return to your desired activities as completely as possible.  The First Week  Early to bed, late to rise and frequent rest periods throughout the day. Get at least 8 hours of sleep each night. A disrupted sleep pattern is common after discharge from the hospital and will return to normal over time.   You may not drive, but you may be driven, for short distances, using proper restraints such as shoulder and lap belts for 2-4 WEEKS.   No lifting of more than 10 pounds.   May climb stairs with hand rail   Begin a daily walking program with 1 to 2 blocks initially; schedule a daily time and increase distance daily.   Eat a regular, balanced diet.   Take medications as prescribed, using narcotics as needed.  The Second Week  Resume normal rising and retiring schedule, but continue to rest throughout the day.   You may not drive.   No lifting of anything weighing more than 10 pounds.   May climb stairs with hand rail   Continue scheduled walking, increasing distance and frequency as able.   May resume sexual relations when comfortable.   Begin narcotic weaning as pain diminishes, relying mainly on non-narcotic medications   Follow-up in the office with your physician or nurse, as scheduled, for further instructions.  The Third Week  Resume  normal rising and retiring schedule, resting as needed.   May resume light work around the home; lifting not to exceed 10 pounds.   Continue scheduled walking.  The Fourth Week  Resume normal rising and retiring schedule, resting as needed.   May resume light work around the home; lifting not to exceed 10 pounds.   Continue scheduled walking.  Disability  The usual period of recovery for cervical disc surgery is 8 to 12 weeks and complete healing may take from 3 to 6 months. Some patients may return to work sooner than others depending on their job, response to surgery, and ability to perform other lighter tasks in the work place. Physician approval is required prior to returning to work.  If your employer requires documentation of your work status, our office will provide the necessary information to your employer or other concerned parties. All disability matters may be handled by contacting our office. A mild physical therapy program may be initiated after 6 weeks. This will depend on how your fusion is healing. A more aggressive physical therapy regimen will be initiated on follow-up visits dependent on x-ray verification of your fusion status.

## 2021-11-02 LAB
LEFT EYE DIABETIC RETINOPATHY: NORMAL
RIGHT EYE DIABETIC RETINOPATHY: NORMAL
SEVERITY (EYE EXAM): NORMAL

## 2021-12-02 ENCOUNTER — OFFICE VISIT (OUTPATIENT)
Dept: ENDOCRINOLOGY | Facility: HOSPITAL | Age: 71
End: 2021-12-02
Payer: COMMERCIAL

## 2021-12-02 VITALS
HEIGHT: 70 IN | BODY MASS INDEX: 25.48 KG/M2 | HEART RATE: 77 BPM | WEIGHT: 178 LBS | DIASTOLIC BLOOD PRESSURE: 98 MMHG | SYSTOLIC BLOOD PRESSURE: 144 MMHG

## 2021-12-02 DIAGNOSIS — E11.65 TYPE 2 DIABETES MELLITUS WITH HYPERGLYCEMIA, WITH LONG-TERM CURRENT USE OF INSULIN (HCC): Primary | ICD-10-CM

## 2021-12-02 DIAGNOSIS — E11.42 DIABETIC POLYNEUROPATHY ASSOCIATED WITH TYPE 2 DIABETES MELLITUS (HCC): ICD-10-CM

## 2021-12-02 DIAGNOSIS — Z79.4 TYPE 2 DIABETES MELLITUS WITH HYPERGLYCEMIA, WITH LONG-TERM CURRENT USE OF INSULIN (HCC): Primary | ICD-10-CM

## 2021-12-02 DIAGNOSIS — I10 PRIMARY HYPERTENSION: ICD-10-CM

## 2021-12-02 DIAGNOSIS — E78.2 HYPERCHOLESTEROLEMIA WITH HYPERTRIGLYCERIDEMIA: ICD-10-CM

## 2021-12-02 PROCEDURE — 99205 OFFICE O/P NEW HI 60 MIN: CPT | Performed by: INTERNAL MEDICINE

## 2021-12-02 RX ORDER — HYDROCODONE BITARTRATE AND ACETAMINOPHEN 5; 325 MG/1; MG/1
TABLET ORAL
COMMUNITY
Start: 2021-11-08

## 2021-12-02 RX ORDER — EZETIMIBE 10 MG/1
10 TABLET ORAL DAILY
COMMUNITY

## 2021-12-02 RX ORDER — ICOSAPENT ETHYL 1000 MG/1
CAPSULE ORAL 4 TIMES DAILY
COMMUNITY

## 2021-12-02 RX ORDER — ASPIRIN 81 MG/1
81 TABLET ORAL DAILY
COMMUNITY

## 2021-12-02 RX ORDER — SIMVASTATIN 20 MG
20 TABLET ORAL
COMMUNITY

## 2021-12-02 RX ORDER — LOSARTAN POTASSIUM 50 MG/1
50 TABLET ORAL DAILY
COMMUNITY

## 2021-12-02 RX ORDER — AMITRIPTYLINE HYDROCHLORIDE 50 MG/1
50 TABLET, FILM COATED ORAL
COMMUNITY
Start: 2021-10-29

## 2021-12-02 RX ORDER — POTASSIUM CHLORIDE 1500 MG/1
20 TABLET, EXTENDED RELEASE ORAL DAILY
COMMUNITY
Start: 2021-09-02

## 2021-12-02 RX ORDER — CALCIUM CARBONATE 300MG(750)
TABLET,CHEWABLE ORAL DAILY
COMMUNITY

## 2021-12-09 ENCOUNTER — TELEPHONE (OUTPATIENT)
Dept: ENDOCRINOLOGY | Facility: HOSPITAL | Age: 71
End: 2021-12-09

## 2021-12-14 ENCOUNTER — OFFICE VISIT (OUTPATIENT)
Dept: DIABETES SERVICES | Facility: HOSPITAL | Age: 71
End: 2021-12-14
Payer: COMMERCIAL

## 2021-12-14 VITALS — BODY MASS INDEX: 25.48 KG/M2 | HEIGHT: 70 IN | WEIGHT: 178 LBS

## 2021-12-14 DIAGNOSIS — Z79.4 TYPE 2 DIABETES MELLITUS WITH HYPERGLYCEMIA, WITH LONG-TERM CURRENT USE OF INSULIN (HCC): Primary | ICD-10-CM

## 2021-12-14 DIAGNOSIS — E11.65 TYPE 2 DIABETES MELLITUS WITH HYPERGLYCEMIA, WITH LONG-TERM CURRENT USE OF INSULIN (HCC): Primary | ICD-10-CM

## 2021-12-14 PROCEDURE — 97802 MEDICAL NUTRITION INDIV IN: CPT | Performed by: DIETITIAN, REGISTERED

## 2021-12-14 RX ORDER — BLOOD-GLUCOSE METER
EACH MISCELLANEOUS
Qty: 1 KIT | Refills: 0 | Status: SHIPPED | OUTPATIENT
Start: 2021-12-14

## 2022-01-07 ENCOUNTER — TELEPHONE (OUTPATIENT)
Dept: ENDOCRINOLOGY | Facility: HOSPITAL | Age: 72
End: 2022-01-07

## 2022-03-11 ENCOUNTER — TELEPHONE (OUTPATIENT)
Dept: ENDOCRINOLOGY | Facility: HOSPITAL | Age: 72
End: 2022-03-11

## 2022-03-11 NOTE — TELEPHONE ENCOUNTER
Pt rescheduled 3/15 to 4/1  He does not have any pending labs as far as I can tell  Can you order labs for that appt  Can be mailed

## 2022-03-15 ENCOUNTER — TELEPHONE (OUTPATIENT)
Dept: ENDOCRINOLOGY | Facility: HOSPITAL | Age: 72
End: 2022-03-15

## 2022-03-15 DIAGNOSIS — E11.65 TYPE 2 DIABETES MELLITUS WITH HYPERGLYCEMIA, WITH LONG-TERM CURRENT USE OF INSULIN (HCC): Primary | ICD-10-CM

## 2022-03-15 DIAGNOSIS — Z79.4 TYPE 2 DIABETES MELLITUS WITH HYPERGLYCEMIA, WITH LONG-TERM CURRENT USE OF INSULIN (HCC): Primary | ICD-10-CM

## 2022-04-01 ENCOUNTER — TELEPHONE (OUTPATIENT)
Dept: ADMINISTRATIVE | Facility: OTHER | Age: 72
End: 2022-04-01

## 2022-04-01 ENCOUNTER — OFFICE VISIT (OUTPATIENT)
Dept: ENDOCRINOLOGY | Facility: HOSPITAL | Age: 72
End: 2022-04-01
Payer: COMMERCIAL

## 2022-04-01 VITALS
HEART RATE: 100 BPM | DIASTOLIC BLOOD PRESSURE: 64 MMHG | SYSTOLIC BLOOD PRESSURE: 108 MMHG | BODY MASS INDEX: 27.94 KG/M2 | HEIGHT: 70 IN | WEIGHT: 195.2 LBS

## 2022-04-01 DIAGNOSIS — E78.2 HYPERCHOLESTEROLEMIA WITH HYPERTRIGLYCERIDEMIA: ICD-10-CM

## 2022-04-01 DIAGNOSIS — E11.65 TYPE 2 DIABETES MELLITUS WITH HYPERGLYCEMIA, WITH LONG-TERM CURRENT USE OF INSULIN (HCC): Primary | ICD-10-CM

## 2022-04-01 DIAGNOSIS — E11.42 DIABETIC POLYNEUROPATHY ASSOCIATED WITH TYPE 2 DIABETES MELLITUS (HCC): ICD-10-CM

## 2022-04-01 DIAGNOSIS — I10 PRIMARY HYPERTENSION: ICD-10-CM

## 2022-04-01 DIAGNOSIS — Z79.4 TYPE 2 DIABETES MELLITUS WITH HYPERGLYCEMIA, WITH LONG-TERM CURRENT USE OF INSULIN (HCC): Primary | ICD-10-CM

## 2022-04-01 PROCEDURE — 99214 OFFICE O/P EST MOD 30 MIN: CPT | Performed by: NURSE PRACTITIONER

## 2022-04-01 RX ORDER — ALLOPURINOL 300 MG/1
300 TABLET ORAL DAILY
COMMUNITY
Start: 2022-03-29

## 2022-04-01 RX ORDER — ALLOPURINOL 100 MG/1
100 TABLET ORAL DAILY
COMMUNITY
Start: 2022-03-29

## 2022-04-01 RX ORDER — ATORVASTATIN CALCIUM 40 MG/1
40 TABLET, FILM COATED ORAL DAILY
COMMUNITY
Start: 2022-02-09

## 2022-04-01 NOTE — TELEPHONE ENCOUNTER
Upon review of the In Basket request and the patient's chart, initial outreach has been made via fax, please see Contacts section for details        Att x1 eye    Thank you  Shweta Land

## 2022-04-01 NOTE — LETTER
Diabetic Eye Exam Form    Date Requested: 22  Patient: Viktoria Medellin  Patient : 1950   Referring Provider: Erinn 296 Exam Date _______________________________    Type of Exam MUST be documented for Diabetic Eye Exams  Please CHECK ONE  Dilated Retinal Exam      Optomap-Iris Exam      Fundus Photography Completed       Left Eye - Please check Retinopathy AND Type or No Retinopathy      Exam did show retinopathy    Exam did not show retinopathy         Mild     Proliferative           Moderate    Severe            None         Right Eye - Please check Retinopathy AND Type or No Retinopathy     Exam did show retinopathy    Exam did not show retinopathy         Mild     Proliferative        Moderate    Severe        None       Comments __________________________________________________________    Practice Providing Exam ______________________________________________    Exam Performed By (print name) _______________________________________      Provider Signature ___________________________________________________    These reports are needed for  compliance  Please fax this completed form and a copy of the Diabetic Eye Exam report to our office located at [place] [address] as soon as possible via [phone] anastacio Fernández: Phone [phone]    We thank you for your assistance in treating our mutual patient

## 2022-04-01 NOTE — PROGRESS NOTES
For  Maya Lorenzo 70 y o  male MRN: [id number]    Encounter: [id number]      Assessment/Plan     Assessment: This is a 70y o -year-old male with type 2 diabetes with neuropathy, hypertension and hyperlipidemia  Plan:  1  Type 2 diabetes, insulin requiring  His most recent hemoglobin A1c has improved to 7 3  His C-peptide level is low at 0 56  His Dexcom was downloaded and reviewed with him at the time of the visit  We discussed taking NovoLog before his meals to help his glycemic control throughout the day  Overall he appears to be relatively controlled  He will call the office in 2 weeks for provide a dex com download for further review and adjustment, if necessary      2  Diabetic neuropathy  He has diabetic neuropathy on exam with a diabetic foot exam performed at prior office visit      3  Hypertension  He is normotensive in the office today  Continue current regimen  Check comprehensive metabolic panel prior to next visit      4  Hyperlipidemia  He will continue the same as simvastatin and Zetia and Vascepa  Check fasting lipid panel prior to next visit  CC: Type 2 Diabetes follow up    History of Present Illness     HPI:  70y o  year old male with presumed type 2 diabetes, insulin requiring for 20 years with hypertension and hyperlipidemia of hypertriglyceridemia for follow up  He was diagnosed with diabetes about 20 years ago  He is on insulin at home and takes Lantus insulin 22 units at bedtime and NovoLog insulin  He denies any polyuria, polydipsia, polyphagia, and blurry vision  He has once or twice a night nocturia  He has a bit of numbness of the feet and will feel unsteady at times and has fallen multiple times  He denies chest pain or shortness of breath  He denies nephropathy, retinopathy, heart attack, stroke and claudication but does admit to neuropathy, coronary artery disease and TIAs    He has followed up with medical nutrition therapy recently          Hypoglycemic episodes: Yes occasional    H/o of hypoglycemia causing hospitalization or intervention such as glucagon injection  or ambulance call  No   Hypoglycemia symptoms: jitteriness, sweating and lightheaded  Treatment of hypoglycemia: glucose packets  Glucagon:No   Medic alert tag: recommended,Yes       The patient's last eye exam was in November 2021 with no retinopathy  The patient's last foot exam was performed at his office visit on December 2, 2021  Blood Sugar/Glucometer/Pump/CGM review:  Has started a dex com continuous glucose monitor and is overall much better controlled than last office visit      He has hypertension and takes losartan 50 milligram daily and Bystolic 20 milligram daily  He denies headache or stroke-like symptoms      He has hyperlipidemia with significant hypertriglyceridemia as high as 2400 and takes simvastatin 20 milligram daily, Zetia 10 milligram daily, and Vascepa 1 gram 4 times a day  He denies chest pain or shortness of breath  Review of Systems   Constitutional: Positive for fatigue  Negative for chills and fever  HENT: Negative  Negative for trouble swallowing and voice change  Eyes: Negative for visual disturbance  Respiratory: Negative for cough and shortness of breath  Cardiovascular: Negative for chest pain and palpitations  Gastrointestinal: Negative for abdominal pain, constipation, diarrhea, nausea and vomiting  Endocrine: Negative for cold intolerance, heat intolerance, polydipsia, polyphagia and polyuria  Genitourinary: Negative  Musculoskeletal: Positive for arthralgias (right arm and shoulder) and gait problem (cane due to falls)  Skin: Negative  Allergic/Immunologic: Negative  Neurological: Negative for dizziness, syncope, light-headedness and headaches  Hematological: Negative  Psychiatric/Behavioral: Negative  All other systems reviewed and are negative        Historical Information   Past Medical History:   Diagnosis Date  Arthritis     Cervical disc disease     C2-7, needs repair    Coronary artery disease     angina is pain in throat    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    Hypertension     TIA (transient ischemic attack)     X 2     Past Surgical History:   Procedure Laterality Date    ANKLE ARTHROSCOPY Bilateral     APPENDECTOMY      CATARACT EXTRACTION, BILATERAL Bilateral     CHOLECYSTECTOMY      CORONARY ANGIOPLASTY WITH STENT PLACEMENT      2 stents in LAD    ELBOW ARTHROSCOPY Bilateral     with right elbow tear repaired    ERCP      KNEE ARTHROSCOPY Right     LUMBAR LAMINECTOMY      5 times    MULTIPLE TOOTH EXTRACTIONS      NOSE SURGERY  2021    fracture post a fall    REPLACEMENT TOTAL KNEE Left     SINUS SURGERY      THUMB FUSION Bilateral     thumb repair with bone grafts    TONSILLECTOMY AND ADENOIDECTOMY       Social History   Social History     Substance and Sexual Activity   Alcohol Use Yes    Comment: occasionally a glass of wine once a month      Social History     Substance and Sexual Activity   Drug Use Not Currently     Social History     Tobacco Use   Smoking Status Former Smoker   Smokeless Tobacco Never Used     Family History:   Family History   Problem Relation Age of Onset    Uterine cancer Mother     Colon cancer Father     Cancer Sister         corneal    No Known Problems Brother        Meds/Allergies   Current Outpatient Medications   Medication Sig Dispense Refill    allopurinol (ZYLOPRIM) 100 mg tablet Take 100 mg by mouth daily      allopurinol (ZYLOPRIM) 300 mg tablet Take 300 mg by mouth daily      amitriptyline (ELAVIL) 50 mg tablet Take 50 mg by mouth daily at bedtime        aspirin (ECOTRIN LOW STRENGTH) 81 mg EC tablet Take 81 mg by mouth daily      atorvastatin (LIPITOR) 40 mg tablet Take 40 mg by mouth daily      Blood Glucose Monitoring Suppl (Accu-Chek Guide Me) w/Device KIT Check blood sugars 3 times daily 1 kit 0    ezetimibe (ZETIA) 10 mg tablet Take 10 mg by mouth daily      HYDROcodone-acetaminophen (NORCO) 5-325 mg per tablet       Icosapent Ethyl (Vascepa) 1 g CAPS Take by mouth 4 (four) times a day      Insulin Aspart (NOVOLOG FLEXPEN SC) Inject under the skin 5-8 units 3 times daily      Insulin Glargine (LANTUS SOLOSTAR SC) Inject 22 Units under the skin daily at bedtime      Klor-Con M20 20 MEQ tablet Take 20 mEq by mouth daily        losartan (COZAAR) 50 mg tablet Take 50 mg by mouth daily      Magnesium 400 MG TABS Take by mouth daily        Multiple Vitamins-Minerals (Multivitamin Men 50+) TABS Take by mouth daily      nebivolol (BYSTOLIC) 10 mg tablet Take 10 mg by mouth daily        simvastatin (ZOCOR) 20 mg tablet Take 20 mg by mouth daily at bedtime       No current facility-administered medications for this visit  Allergies   Allergen Reactions    Latex Other (See Comments)     "blisters"    Nitroglycerin Headache     Vomiting,        Objective   Vitals: Blood pressure 108/64, pulse 100, height 5' 10" (1 778 m), weight 88 5 kg (195 lb 3 2 oz)  Physical Exam  Vitals reviewed  Constitutional:       Appearance: He is well-developed  HENT:      Head: Normocephalic and atraumatic  Nose: Nose normal    Eyes:      Conjunctiva/sclera: Conjunctivae normal       Pupils: Pupils are equal, round, and reactive to light  Comments: Wears glasses   Cardiovascular:      Rate and Rhythm: Normal rate and regular rhythm  Heart sounds: Normal heart sounds  Pulmonary:      Effort: Pulmonary effort is normal       Breath sounds: Normal breath sounds  Abdominal:      General: Bowel sounds are normal       Palpations: Abdomen is soft  Musculoskeletal:         General: Normal range of motion  Cervical back: Normal range of motion and neck supple  Skin:     General: Skin is warm and dry  Neurological:      Mental Status: He is alert and oriented to person, place, and time     Psychiatric:         Behavior: Behavior normal          Thought Content: Thought content normal          Judgment: Judgment normal          Lab Results:   Lab Results   Component Value Date/Time    Hemoglobin A1C 11 6 12/09/2021 12:00 AM    Hemoglobin A1C 10 6 09/13/2021 12:00 AM    Hemoglobin A1C 13 8 04/26/2021 12:00 AM    WBC 9 22 08/11/2021 05:01 PM    Hemoglobin 15 2 08/11/2021 05:01 PM    Hematocrit 42 9 08/11/2021 05:01 PM    MCV 90 08/11/2021 05:01 PM    Platelets 995 23/65/6523 05:01 PM    BUN 33 (H) 08/11/2021 05:01 PM    Potassium 4 2 08/11/2021 05:01 PM    Chloride 94 (L) 08/11/2021 05:01 PM    CO2 20 (L) 08/11/2021 05:01 PM    Creatinine 2 10 (H) 08/11/2021 05:01 PM    AST 46 (H) 08/11/2021 05:01 PM     (H) 08/11/2021 05:01 PM    Albumin 3 5 08/11/2021 05:01 PM     Portions of the record may have been created with voice recognition software  Occasional wrong word or "sound a like" substitutions may have occurred due to the inherent limitations of voice recognition software  Read the chart carefully and recognize, using context, where substitutions have occurred

## 2022-04-01 NOTE — LETTER
Diabetic Eye Exam Form    Date Requested: 22  Patient: Donny De Paz  Patient : 1950   Referring Provider: Ina Boas    DIABETIC Eye Exam Date _______________________________    Type of Exam MUST be documented for Diabetic Eye Exams  Please CHECK ONE  Retinal Exam       Dilated Retinal Exam       OCT       Optomap-Iris Exam      Fundus Photography     Left Eye - Please check Retinopathy AND Type or No Retinopathy      Exam did show retinopathy    Exam did not show retinopathy         Mild     Proliferative           Moderate    Severe            None         Right Eye - Please check Retinopathy AND Type or No Retinopathy     Exam did show retinopathy    Exam did not show retinopathy         Mild     Proliferative        Moderate    Severe        None       Comments ____Dr  Garth Macadam ______________________________________________________    Practice Providing Exam ______________________________________________    Exam Performed By (print name) _______________________________________      Provider Signature ___________________________________________________    These reports are needed for  compliance  Please fax this completed form and a copy of the Diabetic Eye Exam report to our office located at [place] [address] as soon as possible via [phone] anastacio Cho: Phone [phone]  We thank you for your assistance in treating our mutual patient

## 2022-04-01 NOTE — PATIENT INSTRUCTIONS
Be mindful of diet  Stay active and stay hydrated  For now, continue current regimen of Novolog and Lantus  Take your Novolog be fore meals  Take Lantus at same time every day  Please call the office in 2 weeks to have your DEX COM downloaded for review and adjustment  Contact the office with any consistent hypoglycemia  Continue Simvastatin, Losartan and Vascepa

## 2022-04-01 NOTE — TELEPHONE ENCOUNTER
----- Message from [facility] sent at 4/1/2022 11:23 AM EDT -----  Regarding: DM EYE EXAM  04/01/22 11:24 AM    Hello, our patient Yesenia Rinaldi has had a DM Eye Exam performed by Dr Francisco Javier Ashford in [place]   Their number is [phone]      Thank you,  [facility]   S  Kwesi Crawford

## 2022-04-01 NOTE — LETTER
Diabetic Eye Exam Form    Date Requested: 22  Patient: Jone Browning  Patient : 1950   Referring Provider: Naomi Hein    DIABETIC Eye Exam Date _______________________________    Type of Exam MUST be documented for Diabetic Eye Exams  Please CHECK ONE  Retinal Exam       Dilated Retinal Exam       OCT       Optomap-Iris Exam      Fundus Photography     Left Eye - Please check Retinopathy AND Type or No Retinopathy      Exam did show retinopathy    Exam did not show retinopathy         Mild     Proliferative           Moderate    Severe            None         Right Eye - Please check Retinopathy AND Type or No Retinopathy     Exam did show retinopathy    Exam did not show retinopathy         Mild     Proliferative        Moderate    Severe        None       Comments ____Dr  Jeffander Bushy ______________________________________________________    Practice Providing Exam ______________________________________________    Exam Performed By (print name) _______________________________________      Provider Signature ___________________________________________________    These reports are needed for  compliance  Please fax this completed form and a copy of the Diabetic Eye Exam report to our office located at [place] [address] as soon as possible via [phone] anastacio Winter: Phone [phone]  We thank you for your assistance in treating our mutual patient

## 2022-04-04 ENCOUNTER — TELEPHONE (OUTPATIENT)
Dept: ENDOCRINOLOGY | Facility: HOSPITAL | Age: 72
End: 2022-04-04

## 2022-04-04 NOTE — TELEPHONE ENCOUNTER
Patient states when he was here last you gave him a Dexcom transmitter  When he put it on, it gives him a message that says it's not picking up the sensor signal  Can we give him another one? For Maribel Kothari, Is this something we can call Dexcom about since it was a faulty transmitter? Not sure if that sounds like something that normally happens?

## 2022-04-08 NOTE — TELEPHONE ENCOUNTER
As a follow-up, a second attempt has been made for outreach via fax, please see Contacts section for details        [employer]  Thank you  Maria G Harrell

## 2022-04-14 NOTE — TELEPHONE ENCOUNTER
As a final attempt, a third outreach has been made via telephone call  The outcome of the telephone call was a fax request form to be sent to Office  Please see Contacts section for details  This encounter will be closed and completed by end of day  Should we receive the requested information because of previous outreach attempts, the requested patient's chart will be updated appropriately       Thank you  Lisa Flores

## 2022-04-19 NOTE — TELEPHONE ENCOUNTER
Upon review of the In Basket request we were able to locate, review, and update the patient chart as requested for Diabetic Eye Exam     Any additional questions or concerns should be emailed to the Practice Liaisons via [email or web address] email, please do not reply via In Basket      Thank you  Ton Nelson

## 2022-04-28 ENCOUNTER — HOSPITAL ENCOUNTER (OUTPATIENT)
Facility: HOSPITAL | Age: 72
Setting detail: HOSPITAL OUTPATIENT SURGERY
End: 2022-04-28
Attending: ORTHOPAEDIC SURGERY | Admitting: ORTHOPAEDIC SURGERY
Payer: COMMERCIAL

## 2022-05-17 ENCOUNTER — APPOINTMENT (OUTPATIENT)
Dept: PREADMISSION TESTING | Facility: HOSPITAL | Age: 72
End: 2022-05-17
Payer: COMMERCIAL

## 2022-05-17 VITALS — WEIGHT: 190 LBS | BODY MASS INDEX: 27.2 KG/M2 | HEIGHT: 70 IN

## 2022-05-17 NOTE — PRE-PROCEDURE INSTRUCTIONS
1. We will call you between 3 pm and 7 pm on 5/20/22 to inform you of arrival time for your procedure. If you do not hear by 6PM, please call [phone] for arrival time.     2. Please arrive through the Main Entrance of the [facility] (across from the parking garage) and report to the Surgery Registration Desk on the day of your procedure.    3. Please follow the following fasting guidelines:     No solid food EIGHT HOURS prior to surgery.  Unlimited clear liquids, meaning water or PLAIN black coffee WITHOUT any milk, cream, sugar, or sweetener are permitted up to TWO HOURS prior to arrival at the hospital.    4. Early on the morning of the procedure please take your usual dose of the listed medications with a sip of water:  bystolic  flonase  Norco if needed      No vitamins or supplements and no NSAIDS 5-7 days prior to the procedure. You can take tylenol as needed.    Please defer to physician's instruction for aspirin products or anticoagulation medication.     Please call endocrinologist regarding insulin.      5. Other Instructions: You may brush your teeth the morning of the procedure. Rinse and spit, do not swallow.  Bring a list of your medications with dosages.  Use surgical wash as directed.     6. If you develop a cold, cough, fever, rash, or other symptom prior to the day of the procedure, please report it to your physician immediately.    7. If you need to cancel the procedure for any reason, please contact your physician.    8. Make arrangements to have someone drive you home from the procedure. If you have not arranged for transportation home, your surgery may be cancelled.     9. You may not take public transportation unless accompanied by a responsible person.    10. You may not drive a car or operate complex or potentially dangerous machinery for 24 hours following anesthesia and/or sedation.    11.  If it is medically necessary for you to have a longer stay, you will be informed as soon as the  decision is made.    12. Only bring essential items to the hospital.  Do not wear or bring anything of value to the hospital including jewelry of any kind, money, or wallet. Do not wear make-up or contact lenses.  DO NOT BRING MEDICATIONS FROM HOME unless instructed to do so. DO bring your hearing aids, glasses, and a case    13. No lotion, creams, powders, or oils on skin the morning of procedure     14. Dress in comfortable clothes.    15.  If instructed, please bring a copy of your Advanced Directive (Living Will/Durable Power of ) on the day of your procedure.     16. Patients need to quarantine from the time of PAT COVID test to day of surgery, regardless of COVID vaccine status.      17. Ensuring your safety at all times is a very important part of our [facility] Culture of Safety. After having surgery and sedation, you are at risk for falling and balance issues. Although you may feel awake, the effects of the medication can last up to 24 hours after anesthesia. If you need to use the bathroom during your recovery period, nursing staff will escort you there and stay with you to ensure your safety.    18. Refrain from drinking alcohol and smoking cigarettes for 24 hours prior to surgery.    19. Shower with antibacterial soap (Dial) the night before and morning of your procedure.  If your procedure indicates the need for CHG antiseptic wash (Bactoshield or Hibiclens), please use this instead and follow instructions as discussed at the time of your Pre-Admission Testing phone interview or visit.    Above instructions reviewed with patient and patient acknowledges understanding.    Form explained by: Leni Pompa RN

## 2022-05-18 ENCOUNTER — APPOINTMENT (OUTPATIENT)
Dept: PREADMISSION TESTING | Facility: HOSPITAL | Age: 72
End: 2022-05-18
Attending: ORTHOPAEDIC SURGERY
Payer: COMMERCIAL

## 2022-05-18 ENCOUNTER — TRANSCRIBE ORDERS (OUTPATIENT)
Dept: REGISTRATION | Facility: HOSPITAL | Age: 72
End: 2022-05-18

## 2022-05-18 DIAGNOSIS — M19.011 PRIMARY OSTEOARTHRITIS, RIGHT SHOULDER: ICD-10-CM

## 2022-05-18 DIAGNOSIS — M75.111 INCOMPLETE ROTATOR CUFF TEAR OR RUPTURE OF RIGHT SHOULDER, NOT SPECIFIED AS TRAUMATIC: ICD-10-CM

## 2022-05-18 DIAGNOSIS — M19.011 PRIMARY OSTEOARTHRITIS, RIGHT SHOULDER: Primary | ICD-10-CM

## 2022-05-18 PROCEDURE — C9803 HOPD COVID-19 SPEC COLLECT: HCPCS

## 2022-05-18 PROCEDURE — U0003 INFECTIOUS AGENT DETECTION BY NUCLEIC ACID (DNA OR RNA); SEVERE ACUTE RESPIRATORY SYNDROME CORONAVIRUS 2 (SARS-COV-2) (CORONAVIRUS DISEASE [COVID-19]), AMPLIFIED PROBE TECHNIQUE, MAKING USE OF HIGH THROUGHPUT TECHNOLOGIES AS DESCRIBED BY CMS-2020-01-R: HCPCS

## 2022-05-19 DIAGNOSIS — E11.65 TYPE 2 DIABETES MELLITUS WITH HYPERGLYCEMIA, WITH LONG-TERM CURRENT USE OF INSULIN (HCC): Primary | ICD-10-CM

## 2022-05-19 DIAGNOSIS — Z79.4 TYPE 2 DIABETES MELLITUS WITH HYPERGLYCEMIA, WITH LONG-TERM CURRENT USE OF INSULIN (HCC): Primary | ICD-10-CM

## 2022-05-19 LAB — SARS-COV-2 RNA RESP QL NAA+PROBE: NEGATIVE

## 2022-05-19 NOTE — TELEPHONE ENCOUNTER
Patient called  He was admitted to the hospital over the weekend with bell's palsy and was put on Prednisone  He notes that his blood sugars have been running in the 160's   Please advise

## 2022-05-20 RX ORDER — INSULIN GLARGINE 100 [IU]/ML
22 INJECTION, SOLUTION SUBCUTANEOUS
Qty: 15 ML | Refills: 2 | Status: SHIPPED | OUTPATIENT
Start: 2022-05-20 | End: 2022-05-23 | Stop reason: SDUPTHER

## 2022-05-20 RX ORDER — INSULIN ASPART 100 [IU]/ML
INJECTION, SOLUTION INTRAVENOUS; SUBCUTANEOUS
Qty: 15 ML | Refills: 2 | Status: SHIPPED | OUTPATIENT
Start: 2022-05-20 | End: 2022-05-23 | Stop reason: SDUPTHER

## 2022-05-23 DIAGNOSIS — Z79.4 TYPE 2 DIABETES MELLITUS WITH HYPERGLYCEMIA, WITH LONG-TERM CURRENT USE OF INSULIN (HCC): Primary | ICD-10-CM

## 2022-05-23 DIAGNOSIS — E11.65 TYPE 2 DIABETES MELLITUS WITH HYPERGLYCEMIA, WITH LONG-TERM CURRENT USE OF INSULIN (HCC): Primary | ICD-10-CM

## 2022-05-23 NOTE — TELEPHONE ENCOUNTER
Generic Novolog and Lantus are not covered by insurance  BRAND specific are the covered alternatives  Please send 
yes

## 2022-05-25 RX ORDER — INSULIN GLARGINE 100 [IU]/ML
22 INJECTION, SOLUTION SUBCUTANEOUS
Qty: 15 ML | Refills: 2 | Status: SHIPPED | OUTPATIENT
Start: 2022-05-25 | End: 2022-06-15 | Stop reason: SDUPTHER

## 2022-05-25 RX ORDER — INSULIN ASPART 100 [IU]/ML
INJECTION, SOLUTION INTRAVENOUS; SUBCUTANEOUS
Qty: 15 ML | Refills: 2 | Status: SHIPPED | OUTPATIENT
Start: 2022-05-25 | End: 2022-06-15 | Stop reason: SDUPTHER

## 2022-06-15 DIAGNOSIS — Z79.4 TYPE 2 DIABETES MELLITUS WITH HYPERGLYCEMIA, WITH LONG-TERM CURRENT USE OF INSULIN (HCC): Primary | ICD-10-CM

## 2022-06-15 DIAGNOSIS — E11.65 TYPE 2 DIABETES MELLITUS WITH HYPERGLYCEMIA, WITH LONG-TERM CURRENT USE OF INSULIN (HCC): Primary | ICD-10-CM

## 2022-06-16 RX ORDER — INSULIN ASPART 100 [IU]/ML
INJECTION, SOLUTION INTRAVENOUS; SUBCUTANEOUS
Qty: 15 ML | Refills: 2 | Status: SHIPPED | OUTPATIENT
Start: 2022-06-16

## 2022-06-16 RX ORDER — INSULIN GLARGINE 100 [IU]/ML
30 INJECTION, SOLUTION SUBCUTANEOUS
Qty: 15 ML | Refills: 2 | Status: SHIPPED | OUTPATIENT
Start: 2022-06-16

## 2022-07-29 LAB — HBA1C MFR BLD HPLC: 7.5 %

## 2022-08-03 ENCOUNTER — OFFICE VISIT (OUTPATIENT)
Dept: ENDOCRINOLOGY | Facility: HOSPITAL | Age: 72
End: 2022-08-03
Payer: COMMERCIAL

## 2022-08-03 VITALS
HEIGHT: 70 IN | SYSTOLIC BLOOD PRESSURE: 126 MMHG | BODY MASS INDEX: 29.26 KG/M2 | HEART RATE: 93 BPM | OXYGEN SATURATION: 94 % | WEIGHT: 204.4 LBS | DIASTOLIC BLOOD PRESSURE: 70 MMHG

## 2022-08-03 DIAGNOSIS — N18.30 STAGE 3 CHRONIC KIDNEY DISEASE, UNSPECIFIED WHETHER STAGE 3A OR 3B CKD (HCC): ICD-10-CM

## 2022-08-03 DIAGNOSIS — E11.65 TYPE 2 DIABETES MELLITUS WITH HYPERGLYCEMIA, WITH LONG-TERM CURRENT USE OF INSULIN (HCC): Primary | ICD-10-CM

## 2022-08-03 DIAGNOSIS — Z79.4 TYPE 2 DIABETES MELLITUS WITH HYPERGLYCEMIA, WITH LONG-TERM CURRENT USE OF INSULIN (HCC): Primary | ICD-10-CM

## 2022-08-03 PROCEDURE — 99214 OFFICE O/P EST MOD 30 MIN: CPT | Performed by: PHYSICIAN ASSISTANT

## 2022-08-03 PROCEDURE — 95251 CONT GLUC MNTR ANALYSIS I&R: CPT | Performed by: PHYSICIAN ASSISTANT

## 2022-08-03 NOTE — PROGRESS NOTES
Esperanza Kunz 70 y o  male MRN: [id number]    Encounter: [id number]      Assessment/Plan     Assessment: This is a 70y o -year-old male with type 2 diabetes with neuropathy, hypertension and hyperlipidemia  Plan:  1  Type 2 diabetes, insulin requiring:  Most recent hemoglobin A1c is 7 5  Review of his Dexcom he is running slightly high for most of the day  Does appear to have some done phenomenon going on, so I asked to increase his NovoLog at breakfast 14 units if we can bring glucose levels down later in the day  He will continue with 10 units with lunch and dinner, plus sliding scale as he sees fit  Continue with Lantus 30 units daily  Continue to utilize Dexcom to monitor glucose levels  Contact the office with any concerns or questions  Follow-up in 3 months with lab work completed prior to visit  2  Diabetic neuropathy:  Stable  Diabetic foot exam is up-to-date  3  Hypertension:  Normotensive in the office  Kidney function remains stable normal electrolytes  Continue with current blood pressure medications  4  Hyperlipidemia:  Overall lipid panel is looking well, triglycerides are still elevated  Continue with lifestyle changes  No adjustments to medication at this time  Will repeat prior to next office visit        CC:  Type 2 diabetes follow-up    History of Present Illness     HPI:  72 year old male with presumed type 2 diabetes, insulin requiring for 20 years with hypertension and hyperlipidemia of hypertriglyceridemia for follow up   He was diagnosed with diabetes about 20 years ago  He is on insulin at home and takes Lantus insulin 22 units at bedtime and NovoLog insulin  He denies any polyuria, polydipsia, polyphagia, and blurry vision   He has once or twice a night nocturia   He has a bit of numbness of the feet and will feel unsteady at times and has fallen multiple times  Tiny Virk denies chest pain or shortness of breath   He denies nephropathy, retinopathy, heart attack, stroke and claudication but does admit to neuropathy, coronary artery disease and TIAs  He has followed up with medical nutrition therapy recently  He was admitted to the hospital May 15 through May 18, 2022 for right-sided facial droop  Initial concern was a TIA, but was diagnosed with Bell palsy  Was placed on course of steroids, and states that symptoms lasted for about 2 5 months  Does still have a slight twinge in the corner of the right side of his mouth  Otherwise is doing much better at this time           Hypoglycemic episodes: Yes occasional    H/o of hypoglycemia causing hospitalization or intervention such as glucagon injection  or ambulance call  No   Hypoglycemia symptoms: jitteriness, sweating and lightheaded  Treatment of hypoglycemia: glucose packets  Glucagon:No   Medic alert tag: recommended,Yes       The patient's last eye exam was in November 2021 with no retinopathy   The patient's last foot exam was performed at his office visit on December 2, 2021       Blood Sugar/Glucometer/Pump/CGM review:  Download of Dexcom from July 21 through August 3, 2022 reveals an average glucose level of 176 with the standard deviation of 52  He is in target range 52% time, below target range 1% time, above target range 47% time  Overnight glucose levels are typically doing quite well  Does start having increase around 5:00 a m , and then runs slightly high throughout the day  Around dinner glucose levels 10 to trend downward      He has hypertension and takes losartan 50 mg daily and Bystolic 20 mg daily   He denies headache or stroke-like symptoms      He has hyperlipidemia with significant hypertriglyceridemia as high as 2400 and takes simvastatin 20 mg daily, Zetia 10 mg daily, and Vascepa 1 g 4 times a day   He denies chest pain or shortness of breath  Review of Systems   Constitutional: Negative for activity change, appetite change, fatigue and unexpected weight change     HENT: Negative for trouble swallowing  Eyes: Negative for visual disturbance  Respiratory: Negative for chest tightness and shortness of breath  Cardiovascular: Negative for chest pain, palpitations and leg swelling  Gastrointestinal: Negative for abdominal pain, diarrhea, nausea and vomiting  Endocrine: Negative for cold intolerance, heat intolerance, polydipsia, polyphagia and polyuria  Genitourinary: Negative for frequency  Skin: Negative for rash and wound  Neurological: Negative for dizziness, weakness, light-headedness, numbness and headaches  Psychiatric/Behavioral: Negative for dysphoric mood and sleep disturbance  The patient is not nervous/anxious          Historical Information   Past Medical History:   Diagnosis Date    Arthritis     Cervical disc disease     C2-7, needs repair    Coronary artery disease     angina is pain in throat    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    Hypertension     TIA (transient ischemic attack)     X 2     Past Surgical History:   Procedure Laterality Date    ANKLE ARTHROSCOPY Bilateral     APPENDECTOMY      CATARACT EXTRACTION, BILATERAL Bilateral     CHOLECYSTECTOMY      CORONARY ANGIOPLASTY WITH STENT PLACEMENT      2 stents in LAD    ELBOW ARTHROSCOPY Bilateral     with right elbow tear repaired    ERCP      KNEE ARTHROSCOPY Right     LUMBAR LAMINECTOMY      5 times    MULTIPLE TOOTH EXTRACTIONS      NOSE SURGERY  2021    fracture post a fall    REPLACEMENT TOTAL KNEE Left     SINUS SURGERY      THUMB FUSION Bilateral     thumb repair with bone grafts    TONSILLECTOMY AND ADENOIDECTOMY       Social History   Social History     Substance and Sexual Activity   Alcohol Use Yes    Comment: occasionally a glass of wine once a month      Social History     Substance and Sexual Activity   Drug Use Not Currently     Social History     Tobacco Use   Smoking Status Former Smoker   Smokeless Tobacco Never Used     Family History:   Family History Problem Relation Age of Onset    Uterine cancer Mother     Colon cancer Father     Cancer Sister         corneal    No Known Problems Brother        Meds/Allergies   Current Outpatient Medications   Medication Sig Dispense Refill    allopurinol (ZYLOPRIM) 100 mg tablet Take 100 mg by mouth daily      allopurinol (ZYLOPRIM) 300 mg tablet Take 300 mg by mouth daily      amitriptyline (ELAVIL) 50 mg tablet Take 50 mg by mouth daily at bedtime        aspirin (ECOTRIN LOW STRENGTH) 81 mg EC tablet Take 81 mg by mouth daily      atorvastatin (LIPITOR) 40 mg tablet Take 40 mg by mouth daily      Blood Glucose Monitoring Suppl (Accu-Chek Guide Me) w/Device KIT Check blood sugars 3 times daily 1 kit 0    ezetimibe (ZETIA) 10 mg tablet Take 10 mg by mouth daily      HYDROcodone-acetaminophen (NORCO) 5-325 mg per tablet       Icosapent Ethyl (Vascepa) 1 g CAPS Take by mouth 4 (four) times a day      Klor-Con M20 20 MEQ tablet Take 20 mEq by mouth daily        Lantus SoloStar 100 units/mL injection pen Inject 30 Units under the skin daily at bedtime 15 mL 2    losartan (COZAAR) 50 mg tablet Take 50 mg by mouth daily      Magnesium 400 MG TABS Take by mouth daily        Multiple Vitamins-Minerals (Multivitamin Men 50+) TABS Take by mouth daily      nebivolol (BYSTOLIC) 10 mg tablet Take 10 mg by mouth daily        NovoLOG FlexPen 100 units/mL injection pen 10 units 3 times daily with meals 15 mL 2    simvastatin (ZOCOR) 20 mg tablet Take 20 mg by mouth daily at bedtime       No current facility-administered medications for this visit  Allergies   Allergen Reactions    Latex Other (See Comments)     "blisters"    Nitroglycerin Headache     Vomiting,        Objective   Vitals: There were no vitals taken for this visit  Physical Exam  Vitals and nursing note reviewed  Constitutional:       General: He is not in acute distress  Appearance: Normal appearance  He is not diaphoretic     HENT: Head: Normocephalic and atraumatic  Eyes:      General: No scleral icterus  Extraocular Movements: Extraocular movements intact  Conjunctiva/sclera: Conjunctivae normal       Pupils: Pupils are equal, round, and reactive to light  Cardiovascular:      Rate and Rhythm: Normal rate and regular rhythm  Heart sounds: No murmur heard  Pulmonary:      Effort: Pulmonary effort is normal  No respiratory distress  Breath sounds: Normal breath sounds  No wheezing  Musculoskeletal:      Cervical back: Normal range of motion  Right lower leg: No edema  Left lower leg: No edema  Lymphadenopathy:      Cervical: No cervical adenopathy  Skin:     General: Skin is warm and dry  Neurological:      Mental Status: He is alert and oriented to person, place, and time  Mental status is at baseline  Sensory: No sensory deficit  Gait: Gait normal    Psychiatric:         Mood and Affect: Mood normal          Behavior: Behavior normal          Thought Content: Thought content normal          The history was obtained from the review of the chart, patient  Lab Results:   Lab Results   Component Value Date/Time    Hemoglobin A1C 7 5 07/29/2022 12:00 AM    Hemoglobin A1C 11 6 12/09/2021 12:00 AM    Hemoglobin A1C 10 6 09/13/2021 12:00 AM    WBC 9 22 08/11/2021 05:01 PM    Hemoglobin 15 2 08/11/2021 05:01 PM    Hematocrit 42 9 08/11/2021 05:01 PM    MCV 90 08/11/2021 05:01 PM    Platelets 096 26/46/3548 05:01 PM    BUN 33 (H) 08/11/2021 05:01 PM    Potassium 4 2 08/11/2021 05:01 PM    Chloride 94 (L) 08/11/2021 05:01 PM    CO2 20 (L) 08/11/2021 05:01 PM    Creatinine 2 10 (H) 08/11/2021 05:01 PM    AST 46 (H) 08/11/2021 05:01 PM     (H) 08/11/2021 05:01 PM    Albumin 3 5 08/11/2021 05:01 PM         Portions of the record may have been created with voice recognition software   Occasional wrong word or "sound a like" substitutions may have occurred due to the inherent limitations of voice recognition software  Read the chart carefully and recognize, using context, where substitutions have occurred

## 2022-08-03 NOTE — PATIENT INSTRUCTIONS
Continue same dose of Lantus  Increase novolog with breakfast to 14 units, continue with 10 units at lunch and dinner with sliding scale  Continue with Dexcom  Call with any concerns or questions  Follow up in 3 months with lab work

## 2022-09-02 ENCOUNTER — OFFICE VISIT (OUTPATIENT)
Dept: OBGYN CLINIC | Facility: CLINIC | Age: 72
End: 2022-09-02
Payer: COMMERCIAL

## 2022-09-02 VITALS
BODY MASS INDEX: 28.2 KG/M2 | HEIGHT: 70 IN | DIASTOLIC BLOOD PRESSURE: 90 MMHG | WEIGHT: 197 LBS | SYSTOLIC BLOOD PRESSURE: 144 MMHG

## 2022-09-02 DIAGNOSIS — M25.561 ACUTE PAIN OF RIGHT KNEE: Primary | ICD-10-CM

## 2022-09-02 PROCEDURE — 99203 OFFICE O/P NEW LOW 30 MIN: CPT | Performed by: ORTHOPAEDIC SURGERY

## 2022-09-02 NOTE — PROGRESS NOTES
Ortho Sports Medicine Knee Visit     Assesment:   right knee effusion from underlying osteoarthritis    Plan:    Aspiration of the right knee was done today    If his pain continues can consider corticosteroid injection in the future but held off on this due to recent injection with cortisone 15 days ago    Can consider future arthroplasty if needed or MRI if pain localizes to 1 of the joint lines      History of Present Illness: The patient is a 70 y o  male whose occupation is a  and , referred to me by themself, seen in clinic for evaluation of right knee pain  Pain is located anterior, posterior, lateral, medial   The patient rates the pain as a 9/10  The pain has been present for a few weeks  He denies any specific injury  He noted pain and swelling in his knee  His primary care physician then injected the knee with cortisone 15 days ago  He notes his blood sugar increased from the injection with cortisone as he is a diabetic    He had a left knee replacement in the past   He would like to consider aspiration today of the right knee      Past Medical, Social and Family History:  Past Medical History:   Diagnosis Date    Arthritis     Cervical disc disease     C2-7, needs repair    Coronary artery disease     angina is pain in throat    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    Hypertension     TIA (transient ischemic attack)     X 2     Past Surgical History:   Procedure Laterality Date    ANKLE ARTHROSCOPY Bilateral     APPENDECTOMY      CATARACT EXTRACTION, BILATERAL Bilateral     CHOLECYSTECTOMY      CORONARY ANGIOPLASTY WITH STENT PLACEMENT      2 stents in LAD    ELBOW ARTHROSCOPY Bilateral     with right elbow tear repaired    ERCP      KNEE ARTHROSCOPY Right     LUMBAR LAMINECTOMY      5 times    MULTIPLE TOOTH EXTRACTIONS      NOSE SURGERY  2021    fracture post a fall    REPLACEMENT TOTAL KNEE Left     SINUS SURGERY      THUMB FUSION Bilateral     thumb repair with bone grafts    TONSILLECTOMY AND ADENOIDECTOMY       Allergies   Allergen Reactions    Nitroglycerin Headache     Vomiting,      Current Outpatient Medications on File Prior to Visit   Medication Sig Dispense Refill    allopurinol (ZYLOPRIM) 100 mg tablet Take 100 mg by mouth daily      allopurinol (ZYLOPRIM) 300 mg tablet Take 300 mg by mouth daily      amitriptyline (ELAVIL) 50 mg tablet Take 50 mg by mouth daily at bedtime        aspirin (ECOTRIN LOW STRENGTH) 81 mg EC tablet Take 81 mg by mouth daily      atorvastatin (LIPITOR) 40 mg tablet Take 40 mg by mouth daily      Blood Glucose Monitoring Suppl (Accu-Chek Guide Me) w/Device KIT Check blood sugars 3 times daily 1 kit 0    ezetimibe (ZETIA) 10 mg tablet Take 10 mg by mouth daily      HYDROcodone-acetaminophen (NORCO) 5-325 mg per tablet       Icosapent Ethyl 1 g CAPS Take by mouth 4 (four) times a day      Klor-Con M20 20 MEQ tablet Take 20 mEq by mouth daily        Lantus SoloStar 100 units/mL injection pen Inject 30 Units under the skin daily at bedtime 15 mL 2    losartan (COZAAR) 50 mg tablet Take 50 mg by mouth daily      Magnesium 400 MG TABS Take by mouth daily        Multiple Vitamins-Minerals (Multivitamin Men 50+) TABS Take by mouth daily      nebivolol (BYSTOLIC) 10 mg tablet Take 10 mg by mouth daily        NovoLOG FlexPen 100 units/mL injection pen 10 units 3 times daily with meals 15 mL 2    simvastatin (ZOCOR) 20 mg tablet Take 20 mg by mouth daily at bedtime       No current facility-administered medications on file prior to visit       Social History     Socioeconomic History    Marital status: Single     Spouse name: Not on file    Number of children: Not on file    Years of education: Not on file    Highest education level: Not on file   Occupational History    Occupation: trauma and hospice    Tobacco Use    Smoking status: Former Smoker    Smokeless tobacco: Never Used   Vaping Use    Vaping Use: Never used   Substance and Sexual Activity    Alcohol use: Yes     Comment: occasionally a glass of wine once a month     Drug use: Not Currently    Sexual activity: Not on file   Other Topics Concern    Not on file   Social History Narrative    Not on file     Social Determinants of Health     Financial Resource Strain: Not on file   Food Insecurity: Not on file   Transportation Needs: Not on file   Physical Activity: Not on file   Stress: Not on file   Social Connections: Not on file   Intimate Partner Violence: Not on file   Housing Stability: Not on file         I have reviewed the past medical, surgical, social and family history, medications and allergies as documented in the EMR  Review of systems: ROS is negative other than that noted in the HPI  Constitutional: Negative for fatigue and fever  HENT: Negative for sore throat  Respiratory: Negative for shortness of breath  Cardiovascular: Negative for chest pain  Gastrointestinal: Negative for abdominal pain  Endocrine: Negative for cold intolerance and heat intolerance  Genitourinary: Negative for flank pain  Musculoskeletal: Negative for back pain  Skin: Negative for rash  Allergic/Immunologic: Negative for immunocompromised state  Neurological: Negative for dizziness  Psychiatric/Behavioral: Negative for agitation  Physical Exam:    Blood pressure 144/90, height 5' 10" (1 778 m), weight 89 4 kg (197 lb)      General/Constitutional: NAD, well developed, well nourished  HENT: Normocephalic, atraumatic  CV: Intact distal pulses, regular rate  Resp: No respiratory distress or labored breathing  Lymphatic: No lymphadenopathy palpated  Neuro: Alert and Oriented x 3, no focal deficits  Psych: Normal mood, normal affect, normal judgement, normal behavior  Skin: Warm, dry, no rashes, no erythema       Knee Exam (focused):                  RIGHT LEFT   ROM:   0-110 0-130   Palpation: Effusion moderate negative     MJL tenderness Positive Negative     LJL tenderness Positive Negative   Instability: Varus stable stable     Valgus stable stable   Special Tests: Lachman Negative Negative     Posterior drawer Negative Negative     Anterior drawer Negative Negative     Pivot shift not tested not tested     Dial not tested not tested   Patella: Palpation no tenderness no tenderness     Mobility 1/4 1/4     Apprehension Negative Negative   Other: Single leg 1/4 squat not tested not tested      LE NV Exam: +2 DP/PT pulses bilaterally  Sensation intact to light touch L2-S1 bilaterally     Bilateral hip ROM demonstrates no pain actively or passively    No calf tenderness to palpation bilaterally    Knee Imaging    X-rays of the right knee were reviewed, which demonstrate mild tricompartmental osteoarthritis  There are no images done in our system but he showed me pictures of x-rays on his phone     I have reviewed the radiology report and do not currently have a radiology reading from the outside facility, but have called and asked the facility to fax the official report to our office

## 2022-09-07 DIAGNOSIS — E11.65 TYPE 2 DIABETES MELLITUS WITH HYPERGLYCEMIA, WITH LONG-TERM CURRENT USE OF INSULIN (HCC): Primary | ICD-10-CM

## 2022-09-07 DIAGNOSIS — Z79.4 TYPE 2 DIABETES MELLITUS WITH HYPERGLYCEMIA, WITH LONG-TERM CURRENT USE OF INSULIN (HCC): Primary | ICD-10-CM

## 2022-09-08 RX ORDER — INSULIN ASPART 100 [IU]/ML
INJECTION, SOLUTION INTRAVENOUS; SUBCUTANEOUS
Qty: 15 ML | Refills: 2 | Status: SHIPPED | OUTPATIENT
Start: 2022-09-08

## 2022-09-12 ENCOUNTER — TELEPHONE (OUTPATIENT)
Dept: OBGYN CLINIC | Facility: HOSPITAL | Age: 72
End: 2022-09-12

## 2022-09-12 NOTE — TELEPHONE ENCOUNTER
I received a Care Request from patient to schedule for:  Where Does it Hurt? Rt knee  Are you considering joint replacement? Yes   Are you seeking a second opinion? No  If yes, who is your doctor?          I lvm to cb but he is already scheduled & was schedule prior to sending the care request

## 2022-09-23 ENCOUNTER — APPOINTMENT (OUTPATIENT)
Dept: RADIOLOGY | Facility: CLINIC | Age: 72
End: 2022-09-23
Payer: COMMERCIAL

## 2022-09-23 ENCOUNTER — OFFICE VISIT (OUTPATIENT)
Dept: OBGYN CLINIC | Facility: CLINIC | Age: 72
End: 2022-09-23
Payer: COMMERCIAL

## 2022-09-23 VITALS
DIASTOLIC BLOOD PRESSURE: 80 MMHG | BODY MASS INDEX: 28.63 KG/M2 | HEIGHT: 70 IN | SYSTOLIC BLOOD PRESSURE: 140 MMHG | WEIGHT: 200 LBS

## 2022-09-23 DIAGNOSIS — M17.11 PRIMARY OSTEOARTHRITIS OF RIGHT KNEE: Primary | ICD-10-CM

## 2022-09-23 DIAGNOSIS — M25.561 RIGHT KNEE PAIN, UNSPECIFIED CHRONICITY: ICD-10-CM

## 2022-09-23 DIAGNOSIS — Z01.89 ENCOUNTER FOR LOWER EXTREMITY COMPARISON IMAGING STUDY: ICD-10-CM

## 2022-09-23 DIAGNOSIS — M16.11 PRIMARY OSTEOARTHRITIS OF ONE HIP, RIGHT: ICD-10-CM

## 2022-09-23 DIAGNOSIS — M25.551 RIGHT HIP PAIN: ICD-10-CM

## 2022-09-23 PROCEDURE — 73560 X-RAY EXAM OF KNEE 1 OR 2: CPT

## 2022-09-23 PROCEDURE — 73564 X-RAY EXAM KNEE 4 OR MORE: CPT

## 2022-09-23 PROCEDURE — 99214 OFFICE O/P EST MOD 30 MIN: CPT | Performed by: STUDENT IN AN ORGANIZED HEALTH CARE EDUCATION/TRAINING PROGRAM

## 2022-09-23 PROCEDURE — 77073 BONE LENGTH STUDIES: CPT

## 2022-09-23 RX ORDER — MELOXICAM 15 MG/1
15 TABLET ORAL DAILY
Qty: 30 TABLET | Refills: 2 | Status: SHIPPED | OUTPATIENT
Start: 2022-09-23

## 2022-09-23 NOTE — PROGRESS NOTES
Knee New Office Note    Assessment:     1  Primary osteoarthritis of right knee    2  Primary osteoarthritis of one hip, right    3  Right knee pain, unspecified chronicity    4  Encounter for lower extremity comparison imaging study    5  Right hip pain        Plan:     Problem List Items Addressed This Visit    None     Visit Diagnoses     Primary osteoarthritis of right knee    -  Primary    Relevant Medications    meloxicam (Mobic) 15 mg tablet    Primary osteoarthritis of one hip, right        Relevant Medications    meloxicam (Mobic) 15 mg tablet    Other Relevant Orders    FL spine and pain procedure    Right knee pain, unspecified chronicity        Relevant Orders    XR knee 4+ vw right injury    XR bone length (scanogram)    Encounter for lower extremity comparison imaging study        Relevant Orders    XR knee 1 or 2 vw left    XR bone length (scanogram)    Right hip pain        Relevant Orders    FL spine and pain procedure          69 y/o male with right knee osteoarthritis and right hip osteoarthritis  He has known OA of the right knee which he has treated for in the past with a cortisone injection and an aspiration  Cortisone causes a significant elevation in his glucose, as well as no improvement in his pain, so we would not consider this  Xrays of this right knee and scanogram reviewed today which shows arthritic changes to the right knee as well as moderate-severe arthritis of the right hip  On physical exam he does have mild pain over the medial compartment as well as a mild effusion  In regards to his right hip he has significant limitations of IR with severe pain at the end-point of IR  Discussed that we need to determine the main pain generator, the right hip vs the knee  We will have him get an IA right hip injection without cortisone only marcaine for diagnostic purposes   He will keep a pain diary after the shot to then determine the percent of pain that is generated by the hip versus the knee  We will see him back after his hip injection to see how he did and discuss further options  Mobic prescribed today  Subjective:     Patient ID: Isidra Ivey is a 70 y o  male  Chief Complaint:  Patient is a 69 y/o male who presents today for an evaluation of his RIGHT knee  He was seen about 3 weeks ago by Dr Bertrand Raya for the right knee and he had an aspiration of the right knee at that time  His pain is worse in the medial aspect of the right knee  He did not have a cortisone injection as he has had these before and it causes his glucose to significantly elevate  He is a diabetic and is on insulin, last HbA1c was 6 7  He has been using his Dexcom which has significantly improved his blood sugars  He had the left knee replaced at [facility] 3 years ago  He doesn't have much trouble with the left knee at this time  He is a  as well as a professor      Allergy:  Allergies   Allergen Reactions    Nitroglycerin Headache     Vomiting,      Medications:  all current active meds have been reviewed  Past Medical History:  Past Medical History:   Diagnosis Date    Arthritis     Cervical disc disease     C2-7, needs repair    Coronary artery disease     angina is pain in throat    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    Hypertension     TIA (transient ischemic attack)     X 2     Past Surgical History:  Past Surgical History:   Procedure Laterality Date    ANKLE ARTHROSCOPY Bilateral     APPENDECTOMY      CATARACT EXTRACTION, BILATERAL Bilateral     CHOLECYSTECTOMY      CORONARY ANGIOPLASTY WITH STENT PLACEMENT      2 stents in LAD    ELBOW ARTHROSCOPY Bilateral     with right elbow tear repaired    ERCP      KNEE ARTHROSCOPY Right     LUMBAR LAMINECTOMY      5 times    MULTIPLE TOOTH EXTRACTIONS      NOSE SURGERY  2021    fracture post a fall    REPLACEMENT TOTAL KNEE Left     SINUS SURGERY      THUMB FUSION Bilateral     thumb repair with bone grafts    TONSILLECTOMY AND ADENOIDECTOMY       Family History:  Family History   Problem Relation Age of Onset    Uterine cancer Mother     Colon cancer Father     Cancer Sister         corneal    No Known Problems Brother      Social History:  Social History     Substance and Sexual Activity   Alcohol Use Yes    Comment: occasionally a glass of wine once a month      Social History     Substance and Sexual Activity   Drug Use Not Currently     Social History     Tobacco Use   Smoking Status Former Smoker   Smokeless Tobacco Never Used           ROS:  General: Per HPI  Skin: Negative, except if noted below  HEENT: Negative  Respiratory: Negative  Cardiovascular: Negative  Gastrointestinal: Negative  Urinary: Negative  Vascular: Negative  Musculoskeletal: Positive per HPI   Neurologic: Positive per HPI  Endocrine: Negative    Objective:  BP Readings from Last 1 Encounters:   09/23/22 140/80      Wt Readings from Last 1 Encounters:   09/23/22 90 7 kg (200 lb)        Respiratory:   non-labored respirations    Lymphatics:  no palpable lymph nodes    Gait:   altered mental    Neurologic:   Alert and oriented times 3  Patient with normal sensation except as noted below  Deep tendon reflexes 2+ except as noted in MSK exam    Bilateral Lower Extremity:  Bilateral hips:   Left: Normal motion without pain  Right: Limited IR with pain  +log roll    Left Knee: Inspection:  Skin intact, previous surgical incision present    Overall limb alignment normal    Effusion: none  Do  ROM 0-120 without pain    No varus or valgus instability  Right knee:      Inspection:  Skin intact    Overall limb alignment varus    Effusion: mild    ROM 0-120 pain    Extensor Lag: none    Palpation: mild-moderate medial Joint line tenderness to palpation    AP Stability at 90 deg stable    M/L stability in full extension stable    M/L stability in midflexion stable    Motor: 5/5 IP/Q/HS/TA/GS    Pulses: 2+ DP / 2+ PT    SILT DP/SP/S/S/TN    Imaging:  XR AP scanogram/AP bilateral knee/lateral/chicas/sunrise right knee: moderate joint space narrowing, subchondral sclerosis, subchondral cysts, osteophyte formation  No fracture or dislocation  Moderate-severe Right hip OA noted on scanogram      BMI:   Estimated body mass index is 28 7 kg/m² as calculated from the following:    Height as of this encounter: 5' 10" (1 778 m)  Weight as of this encounter: 90 7 kg (200 lb)  BSA:   Estimated body surface area is 2 09 meters squared as calculated from the following:    Height as of this encounter: 5' 10" (1 778 m)  Weight as of this encounter: 90 7 kg (200 lb)             Scribe Attestation    I,:  Yuliana Schmidt PA-C am acting as a scribe while in the presence of the attending physician :       I,:  Blayne Castrejon DO personally performed the services described in this documentation    as scribed in my presence :

## 2022-09-30 ENCOUNTER — HOSPITAL ENCOUNTER (OUTPATIENT)
Dept: RADIOLOGY | Facility: CLINIC | Age: 72
Discharge: HOME | End: 2022-09-30
Payer: COMMERCIAL

## 2022-09-30 VITALS
SYSTOLIC BLOOD PRESSURE: 164 MMHG | OXYGEN SATURATION: 94 % | HEART RATE: 87 BPM | DIASTOLIC BLOOD PRESSURE: 87 MMHG | RESPIRATION RATE: 20 BRPM | TEMPERATURE: 97.7 F

## 2022-09-30 DIAGNOSIS — M16.11 PRIMARY OSTEOARTHRITIS OF ONE HIP, RIGHT: ICD-10-CM

## 2022-09-30 DIAGNOSIS — M25.551 RIGHT HIP PAIN: ICD-10-CM

## 2022-09-30 PROCEDURE — 77002 NEEDLE LOCALIZATION BY XRAY: CPT

## 2022-09-30 PROCEDURE — 20610 DRAIN/INJ JOINT/BURSA W/O US: CPT | Performed by: ANESTHESIOLOGY

## 2022-09-30 PROCEDURE — 77002 NEEDLE LOCALIZATION BY XRAY: CPT | Performed by: ANESTHESIOLOGY

## 2022-09-30 RX ORDER — BUPIVACAINE HCL/PF 2.5 MG/ML
10 VIAL (ML) INJECTION ONCE
Status: COMPLETED | OUTPATIENT
Start: 2022-09-30 | End: 2022-09-30

## 2022-09-30 RX ADMIN — IOHEXOL 1 ML: 300 INJECTION, SOLUTION INTRAVENOUS at 13:52

## 2022-09-30 RX ADMIN — BUPIVACAINE HYDROCHLORIDE 2 ML: 2.5 INJECTION, SOLUTION EPIDURAL; INFILTRATION; INTRACAUDAL at 13:52

## 2022-09-30 NOTE — H&P
History of Present Illness: The patient is a 70 y o  male who presents with complaints of hip pain      Past Medical History:   Diagnosis Date    Arthritis     Cervical disc disease     C2-7, needs repair    Coronary artery disease     angina is pain in throat    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    Hypertension     TIA (transient ischemic attack)     X 2       Past Surgical History:   Procedure Laterality Date    ANKLE ARTHROSCOPY Bilateral     APPENDECTOMY      CATARACT EXTRACTION, BILATERAL Bilateral     CHOLECYSTECTOMY      CORONARY ANGIOPLASTY WITH STENT PLACEMENT      2 stents in LAD    ELBOW ARTHROSCOPY Bilateral     with right elbow tear repaired    ERCP      KNEE ARTHROSCOPY Right     LUMBAR LAMINECTOMY      5 times    MULTIPLE TOOTH EXTRACTIONS      NOSE SURGERY  2021    fracture post a fall    REPLACEMENT TOTAL KNEE Left     SINUS SURGERY      THUMB FUSION Bilateral     thumb repair with bone grafts    TONSILLECTOMY AND ADENOIDECTOMY           Current Outpatient Medications:     allopurinol (ZYLOPRIM) 100 mg tablet, Take 100 mg by mouth daily, Disp: , Rfl:     allopurinol (ZYLOPRIM) 300 mg tablet, Take 300 mg by mouth daily, Disp: , Rfl:     amitriptyline (ELAVIL) 50 mg tablet, Take 50 mg by mouth daily at bedtime  , Disp: , Rfl:     aspirin (ECOTRIN LOW STRENGTH) 81 mg EC tablet, Take 81 mg by mouth daily, Disp: , Rfl:     atorvastatin (LIPITOR) 40 mg tablet, Take 40 mg by mouth daily, Disp: , Rfl:     Blood Glucose Monitoring Suppl (Accu-Chek Guide Me) w/Device KIT, Check blood sugars 3 times daily, Disp: 1 kit, Rfl: 0    ezetimibe (ZETIA) 10 mg tablet, Take 10 mg by mouth daily, Disp: , Rfl:     HYDROcodone-acetaminophen (NORCO) 5-325 mg per tablet, , Disp: , Rfl:     Icosapent Ethyl 1 g CAPS, Take by mouth 4 (four) times a day, Disp: , Rfl:     Klor-Con M20 20 MEQ tablet, Take 20 mEq by mouth daily  , Disp: , Rfl:     Lantus SoloStar 100 units/mL injection pen, Inject 30 Units under the skin daily at bedtime, Disp: 15 mL, Rfl: 2    losartan (COZAAR) 50 mg tablet, Take 50 mg by mouth daily, Disp: , Rfl:     Magnesium 400 MG TABS, Take by mouth daily  , Disp: , Rfl:     meloxicam (Mobic) 15 mg tablet, Take 1 tablet (15 mg total) by mouth daily, Disp: 30 tablet, Rfl: 2    Multiple Vitamins-Minerals (Multivitamin Men 50+) TABS, Take by mouth daily, Disp: , Rfl:     nebivolol (BYSTOLIC) 10 mg tablet, Take 10 mg by mouth daily  , Disp: , Rfl:     NovoLOG FlexPen 100 units/mL injection pen, 14 units 3 times daily with meals, Disp: 15 mL, Rfl: 2    simvastatin (ZOCOR) 20 mg tablet, Take 20 mg by mouth daily at bedtime, Disp: , Rfl:     Current Facility-Administered Medications:     bupivacaine (PF) (MARCAINE) 0 25 % injection 10 mL, 10 mL, Intra-articular, Once, Ottoniel Pinedo DO    iohexol (OMNIPAQUE) 300 mg/mL injection 50 mL, 50 mL, Intra-articular, Once, Ottoniel Pinedo DO    Allergies   Allergen Reactions    Nitroglycerin Headache     Vomiting,        Physical Exam:   General: Awake, Alert, Oriented x 3, Mood and affect appropriate  Respiratory: Respirations even and unlabored  Cardiovascular: Peripheral pulses intact; no edema  Musculoskeletal Exam:  Decreased range of motion right    ASA Score: II         Assessment:   1  Primary osteoarthritis of one hip, right    2   Right hip pain        Plan: Right hip pain and OA

## 2022-09-30 NOTE — DISCHARGE INSTRUCTIONS
Do not apply heat to any area that is numb  If you have discomfort or soreness at the injection site, you may apply ice today, 20 minutes on and 20 minutes off  Tomorrow you may use ice or warm, moist heat  Do not apply ice or heat directly to the skin  If you experience severe shortness of breath, go to the Emergency Room  You may have numbness for several hours from the local anesthetic  Please use caution and common sense, especially with weight-bearing activities  You may have an increase or change in the discomfort for 36-48 hours after your treatment  Apply ice and continue with any pain medicine you have been prescribed  Do not do anything strenuous today  You may shower, but no tub baths or hot tubs today  You may resume your normal activities tomorrow, but do not overdo it  Resume normal activities slowly when you are feeling better  If you experience redness, drainage or swelling at the injection site, or if you develop a fever above 100 degrees, please call [facility] at [phone] or go to the Emergency Room  Continue to take all routine medicines prescribed by your primary care physician unless otherwise instructed by our staff  Most blood thinners should be started again according to your regularly scheduled dosing  If you have any questions, please give our office a call  As no general anesthesia was used in today's procedure, you should not experience any side effects related to anesthesia  If you have a problem specifically related to your procedure, please call our office at [phone]  Problems not related to your procedure should be directed to your primary care physician

## 2022-10-07 ENCOUNTER — APPOINTMENT (OUTPATIENT)
Dept: RADIOLOGY | Facility: CLINIC | Age: 72
End: 2022-10-07
Payer: COMMERCIAL

## 2022-10-07 ENCOUNTER — PREP FOR PROCEDURE (OUTPATIENT)
Dept: OBGYN CLINIC | Facility: CLINIC | Age: 72
End: 2022-10-07

## 2022-10-07 ENCOUNTER — OFFICE VISIT (OUTPATIENT)
Dept: OBGYN CLINIC | Facility: CLINIC | Age: 72
End: 2022-10-07
Payer: COMMERCIAL

## 2022-10-07 VITALS
DIASTOLIC BLOOD PRESSURE: 83 MMHG | SYSTOLIC BLOOD PRESSURE: 129 MMHG | WEIGHT: 200 LBS | HEART RATE: 94 BPM | BODY MASS INDEX: 28.63 KG/M2 | HEIGHT: 70 IN

## 2022-10-07 DIAGNOSIS — M17.11 PRIMARY OSTEOARTHRITIS OF RIGHT KNEE: ICD-10-CM

## 2022-10-07 DIAGNOSIS — M16.11 PRIMARY OSTEOARTHRITIS OF ONE HIP, RIGHT: Primary | ICD-10-CM

## 2022-10-07 DIAGNOSIS — M25.461 EFFUSION OF RIGHT KNEE: ICD-10-CM

## 2022-10-07 DIAGNOSIS — M16.11 PRIMARY OSTEOARTHRITIS OF ONE HIP, RIGHT: ICD-10-CM

## 2022-10-07 PROCEDURE — 89060 EXAM SYNOVIAL FLUID CRYSTALS: CPT

## 2022-10-07 PROCEDURE — 20610 DRAIN/INJ JOINT/BURSA W/O US: CPT | Performed by: STUDENT IN AN ORGANIZED HEALTH CARE EDUCATION/TRAINING PROGRAM

## 2022-10-07 PROCEDURE — 99214 OFFICE O/P EST MOD 30 MIN: CPT | Performed by: STUDENT IN AN ORGANIZED HEALTH CARE EDUCATION/TRAINING PROGRAM

## 2022-10-07 PROCEDURE — 73502 X-RAY EXAM HIP UNI 2-3 VIEWS: CPT

## 2022-10-07 RX ORDER — SODIUM CHLORIDE, SODIUM LACTATE, POTASSIUM CHLORIDE, CALCIUM CHLORIDE 600; 310; 30; 20 MG/100ML; MG/100ML; MG/100ML; MG/100ML
125 INJECTION, SOLUTION INTRAVENOUS CONTINUOUS
OUTPATIENT
Start: 2022-10-07

## 2022-10-07 RX ORDER — CHLORHEXIDINE GLUCONATE 0.12 MG/ML
15 RINSE ORAL ONCE
OUTPATIENT
Start: 2022-10-07 | End: 2022-10-07

## 2022-10-07 RX ORDER — MELATONIN
2000 DAILY
Qty: 30 TABLET | Refills: 0 | Status: SHIPPED | OUTPATIENT
Start: 2022-10-07

## 2022-10-07 RX ORDER — CHLORHEXIDINE GLUCONATE 4 G/100ML
SOLUTION TOPICAL DAILY PRN
OUTPATIENT
Start: 2022-10-07

## 2022-10-07 RX ORDER — ACETAMINOPHEN 325 MG/1
975 TABLET ORAL ONCE
OUTPATIENT
Start: 2022-10-07 | End: 2022-10-07

## 2022-10-07 RX ORDER — FOLIC ACID 1 MG/1
1 TABLET ORAL DAILY
Qty: 30 TABLET | Refills: 3 | Status: SHIPPED | OUTPATIENT
Start: 2022-10-07

## 2022-10-07 RX ORDER — MULTIVIT-MIN/IRON FUM/FOLIC AC 7.5 MG-4
1 TABLET ORAL DAILY
Qty: 30 TABLET | Refills: 3 | Status: SHIPPED | OUTPATIENT
Start: 2022-10-07

## 2022-10-07 RX ORDER — ONDANSETRON 2 MG/ML
4 INJECTION INTRAMUSCULAR; INTRAVENOUS ONCE
OUTPATIENT
Start: 2022-10-07 | End: 2022-10-07

## 2022-10-07 RX ORDER — GABAPENTIN 100 MG/1
300 CAPSULE ORAL ONCE
OUTPATIENT
Start: 2022-10-07 | End: 2022-10-07

## 2022-10-07 RX ORDER — ASCORBIC ACID 500 MG
500 TABLET ORAL 2 TIMES DAILY
Qty: 30 TABLET | Refills: 3 | Status: SHIPPED | OUTPATIENT
Start: 2022-10-07

## 2022-10-07 NOTE — PROGRESS NOTES
Knee Follow up Office Note    Assessment:     1  Primary osteoarthritis of one hip, right    2  Primary osteoarthritis of right knee    3  Effusion of right knee        Plan:     Problem List Items Addressed This Visit        Musculoskeletal and Integument    Primary osteoarthritis of one hip, right - Primary    Relevant Medications    ascorbic acid (VITAMIN C) 780 MG tablet    folic acid (FOLVITE) 1 mg tablet    Multiple Vitamins-Minerals (multivitamin with minerals) tablet    cholecalciferol (VITAMIN D3) 1,000 units tablet    Other Relevant Orders    XR hip/pelv 2-3 vws right if performed    Case request operating room: ARTHROPLASTY HIP TOTAL ANTERIOR,NAVIGATED- SAME DAY (Completed)    Comprehensive metabolic panel    Hemoglobin A1C W/EAG Estimation    CBC and differential    Anemia Panel w/Reflex    Protime-INR    APTT    Type and screen    Ambulatory referral to [facility]    Ambulatory referral to Physical Therapy      Other Visit Diagnoses     Primary osteoarthritis of right knee        Relevant Orders    Injection Procedure Prior Authorization    Effusion of right knee        Relevant Orders    Synovial fluid, crystal          71 y/o male with right hip osteoarthritis and right knee osteoarthritis  He has known OA of the right hip which were seen on his scanogram at last visit  Updated xrays performed today of the right hip  He has limited and painful range of motion of the right hip on exam   He did well with pain relief initially after his marcaine challenge injection, but this returned when the injection wore off  He also has knee OA which has had previous aspirations  Aspiration repeated today, synovial fluid potentially with gouty flare up, so sent for crystals  Visco ordered for mild right knee OA since he is unable to have cortisone do to severe elevations in blood glucose with previous injections   In regards to the right hip the patient has failed PT, NSAIDs and activity modifications  The patient has elected to proceed with right BRANDEE through the anterior approach  Risks and benefits of surgery to include but not limited to bleeding, infection, damage to surrounding structures, hardware failure, instability, fracture, dislocation, leg length inequality, need for further surgery, continued pain, stiffness, blood clots, stroke, heart attack, and LFCN numbness was discussed with the patient  Informed consent was signed today in the office  The patient has met with our surgical schedulers and our preoperative joint replacement pathway has been initiated  All questions were answered  Patient will follow-up 2 weeks post operatively  Patient is a nonsmoker and appropriate BMI  Subjective:     Patient ID: Cristino Esparza is a 70 y o  male  Chief Complaint: right hip and knee pain  Patient is a 71 y/o male who presents today for a follow up evaluation of his RIGHT hip  He was seen a few weeks ago with the complaint of right knee pain  On physical exam it was found that he had significant pain with his hip and limited range of motion  Xrays of the right hip from his scanogram revealed bone on bone right hip OA  He was sent for a a Right hip marcaine only injection for both diagnostic and therapeutic purposes  He states that after his hip injection he had significant improvement in his pain symptoms  Since the shot has worn off the majority of his pain has returned  He is interested in discussing BRANDEE today  He also complains of continued swelling in the right knee  He had an aspiration of the right knee about 1 month ago with Dr Annie Marie  He is unable to get cortisone injections because it significantly elevates his glucose levels  He is a diabetic and is on insulin, last HbA1c was 6 7  He has been using his Dexcom which has significantly improved his blood sugars  He would like a repeat aspiration today  He does have a history of gout and he takes allopurinol       He is a  as well as a professor      Allergy:  Allergies   Allergen Reactions    Nitroglycerin Headache     Vomiting,      Medications:  all current active meds have been reviewed  Past Medical History:  Past Medical History:   Diagnosis Date    Arthritis     Cervical disc disease     C2-7, needs repair    Coronary artery disease     angina is pain in throat    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    Hypertension     TIA (transient ischemic attack)     X 2     Past Surgical History:  Past Surgical History:   Procedure Laterality Date    ANKLE ARTHROSCOPY Bilateral     APPENDECTOMY      CATARACT EXTRACTION, BILATERAL Bilateral     CHOLECYSTECTOMY      CORONARY ANGIOPLASTY WITH STENT PLACEMENT      2 stents in LAD    ELBOW ARTHROSCOPY Bilateral     with right elbow tear repaired    ERCP      KNEE ARTHROSCOPY Right     LUMBAR LAMINECTOMY      5 times    MULTIPLE TOOTH EXTRACTIONS      NOSE SURGERY  2021    fracture post a fall    REPLACEMENT TOTAL KNEE Left     SINUS SURGERY      THUMB FUSION Bilateral     thumb repair with bone grafts    TONSILLECTOMY AND ADENOIDECTOMY       Family History:  Family History   Problem Relation Age of Onset    Uterine cancer Mother     Colon cancer Father     Cancer Sister         corneal    No Known Problems Brother      Social History:  Social History     Substance and Sexual Activity   Alcohol Use Yes    Comment: occasionally a glass of wine once a month      Social History     Substance and Sexual Activity   Drug Use Not Currently     Social History     Tobacco Use   Smoking Status Former Smoker   Smokeless Tobacco Never Used           ROS:  General: Per HPI  Skin: Negative, except if noted below  HEENT: Negative  Respiratory: Negative  Cardiovascular: Negative  Gastrointestinal: Negative  Urinary: Negative  Vascular: Negative  Musculoskeletal: Positive per HPI   Neurologic: Positive per HPI  Endocrine: Negative    Objective:  BP Readings from Last 1 Encounters:   10/07/22 129/83      Wt Readings from Last 1 Encounters:   10/07/22 90 7 kg (200 lb)        Respiratory:   non-labored respirations    Lymphatics:  no palpable lymph nodes    Gait:   altered mental    Neurologic:   Alert and oriented times 3  Patient with normal sensation except as noted below  Deep tendon reflexes 2+ except as noted in MSK exam      Left Hip     Full motion without pain, - log roll    Right Hip     Inspection: skin intact    Range of Motion: Limited IR with severe pain in groin and radiation to knee    +log roll    -SLR Trendelenburg sign    Motor: 5/5 IP/Q/HS/TA/GS    Pulses: 2+ DP / 2+ PT    SILT DP/SP/S/S/TN    Right knee: Inspection:  Skin intact    Overall limb alignment varus    Effusion: moderate    ROM 0-115 pain    Extensor Lag: none    Palpation: mild  medial Joint line tenderness to palpation    AP Stability at 90 deg stable    M/L stability in full extension stable    M/L stability in midflexion stable    Motor: 5/5 IP/Q/HS/TA/GS    Pulses: 2+ DP / 2+ PT    SILT DP/SP/S/S/TN      Imaging:  My interpretation XR AP pelvis/ right hip: mod-severe joint space narrowing most localized to the posteromedial aspect with subchondral sclerosis, subchondral cysts, osteophyte formation  No fracture or dislocation  BMI:   Estimated body mass index is 28 7 kg/m² as calculated from the following:    Height as of this encounter: 5' 10" (1 778 m)  Weight as of this encounter: 90 7 kg (200 lb)  BSA:   Estimated body surface area is 2 09 meters squared as calculated from the following:    Height as of this encounter: 5' 10" (1 778 m)  Weight as of this encounter: 90 7 kg (200 lb)  Large joint arthrocentesis: R knee  Universal Protocol:  Consent: Verbal consent obtained    Risks and benefits: risks, benefits and alternatives were discussed  Consent given by: patient  Patient understanding: patient states understanding of the procedure being performed    Supporting Documentation  Indications: pain   Procedure Details  Location: knee - R knee  Preparation: Patient was prepped and draped in the usual sterile fashion  Needle size: 18 G  Approach: lateral    Aspirate amount: 30 mL  Aspirate: clear, serous and yellow  Analysis: fluid sample sent for laboratory analysis    Patient tolerance: patient tolerated the procedure well with no immediate complications  Dressing:  Sterile dressing applied          Scribe Attestation    I,:  Ashley Harrison PA-C am acting as a scribe while in the presence of the attending physician :       I,:  Nany Cruz DO personally performed the services described in this documentation    as scribed in my presence :

## 2022-10-11 ENCOUNTER — ANESTHESIA EVENT (OUTPATIENT)
Dept: PERIOP | Facility: HOSPITAL | Age: 72
End: 2022-10-11

## 2022-10-14 ENCOUNTER — TELEPHONE (OUTPATIENT)
Dept: OBGYN CLINIC | Facility: HOSPITAL | Age: 72
End: 2022-10-14

## 2022-10-14 DIAGNOSIS — M16.11 PRIMARY OSTEOARTHRITIS OF ONE HIP, RIGHT: Primary | ICD-10-CM

## 2022-10-17 ENCOUNTER — EVALUATION (OUTPATIENT)
Dept: PHYSICAL THERAPY | Facility: CLINIC | Age: 72
End: 2022-10-17
Payer: COMMERCIAL

## 2022-10-17 DIAGNOSIS — M16.11 PRIMARY OSTEOARTHRITIS OF ONE HIP, RIGHT: ICD-10-CM

## 2022-10-17 PROCEDURE — 97162 PT EVAL MOD COMPLEX 30 MIN: CPT | Performed by: PHYSICAL THERAPIST

## 2022-10-17 NOTE — PROGRESS NOTES
PT Evaluation     Today's date: 10/17/2022  Patient name: Dolores Camp  : 1950  MRN: [id number]  Referring provider: Aris Abraham PA-C  Dx:   Encounter Diagnosis     ICD-10-CM    1  Primary osteoarthritis of one hip, right  M16 11 Ambulatory referral to Physical Therapy                  Assessment  Assessment details: Amado Elmore is a 70 y o  male presenting with pain in posterior R hip and anterior R knee that is chronic  He thought that his primary issue was his knee pain, though imaging revealed severely reduced joint space in R hip  MD recommended BRANDEE and it has been scheduled for 22  Pain is the most severe with squatting tasks, especially toileting  Pain is equal for both ascending and descending phases of the movement  Pt can benefit from skilled therapy to improve pain, strength, and range of motion after BRANDEE procedure in order to improve function and quality of life    Impairments: abnormal or restricted ROM, abnormal movement, impaired physical strength, pain with function and weight-bearing intolerance    Symptom irritability: highUnderstanding of Dx/Px/POC: good   Prognosis: good    Goals  Goals will be created once pt returns after BRANDEE procedure    Plan  Patient would benefit from: skilled physical therapy  Planned modality interventions: cryotherapy, TENS, thermotherapy: hydrocollator packs and biofeedback  Planned therapy interventions: balance, massage, manual therapy, self care, stretching, therapeutic activities, therapeutic exercise, flexibility, gait training, graded activity, graded exercise, home exercise program and behavior modification  Frequency: 2x week  Duration in visits: 12  Duration in weeks: 6  Plan of Care beginning date: 2022  Plan of Care expiration date: 2023  Treatment plan discussed with: patient        Subjective Evaluation    History of Present Illness  Mechanism of injury: Pt reports insidious onset of R hip and knee pain which is chronic and has been getting progressively worse             Recurrent probem    Quality of life: good    Pain  Current pain ratin  At best pain ratin  At worst pain ratin  Quality: dull ache  Relieving factors: change in position  Aggravating factors: stair climbing    Social Support  Steps to enter house: yes  2  Stairs in house: yes   12  Lives in: multiple-level home  Lives with: significant other    Employment status: working    Diagnostic Tests  X-ray: abnormal  Treatments  Current treatment: physical therapy  Patient Goals  Patient goals for therapy: increased strength and decreased pain          Objective     Active Range of Motion   Left Hip   Flexion: 115 degrees   External rotation (90/90): 35 degrees   Internal rotation (90/90): 35 degrees     Right Hip   Flexion: 115 degrees   External rotation (90/90): 35 degrees   Internal rotation (90/90): 35 degrees     Strength/Myotome Testing     Left Knee   Flexion: 5  Extension: 5    Right Knee   Flexion: 5  Extension: 5    Additional Strength Details  Hip was too painful to adequately assess strength    Swelling     Left Knee Girth Measurement (cm)   Joint line: 41 cm  10 cm above joint line: 51 cm  10 cm below joint line: 38 cm    Right Knee Girth Measurement (cm)   Joint line: 42 cm  10 cm above joint line: 48 cm  10 cm below joint line: 39 cm             Precautions:       Manuals                                                                 Neuro Re-Ed                                                                                                        Ther Ex                                                                                                                     Ther Activity                                       Gait Training                                       Modalities

## 2022-10-24 ENCOUNTER — TELEPHONE (OUTPATIENT)
Dept: OBGYN CLINIC | Facility: HOSPITAL | Age: 72
End: 2022-10-24

## 2022-10-24 NOTE — TELEPHONE ENCOUNTER
Caller: Self    Doctor/Office: Lyle Mariscal    CB#: [phone]      What needs to be faxed: RESHMA orders    ATTN to: [facility]     Fax#: [phone]      Documents were successfully e-faxed

## 2022-10-25 LAB
DME PARACHUTE DELIVERY DATE REQUESTED: NORMAL
DME PARACHUTE ITEM DESCRIPTION: NORMAL
DME PARACHUTE ORDER STATUS: NORMAL
DME PARACHUTE SUPPLIER NAME: NORMAL
DME PARACHUTE SUPPLIER PHONE: NORMAL

## 2022-11-01 RX ORDER — NIFEDIPINE 30 MG/1
60 TABLET, EXTENDED RELEASE ORAL EVERY EVENING
COMMUNITY
Start: 2022-10-06

## 2022-11-01 RX ORDER — NEBIVOLOL 2.5 MG/1
TABLET ORAL
COMMUNITY
Start: 2022-10-06

## 2022-11-01 NOTE — PRE-PROCEDURE INSTRUCTIONS
Pre-Surgery Instructions:   Medication Instructions   • allopurinol (ZYLOPRIM) 100 mg tablet Take day of surgery  • allopurinol (ZYLOPRIM) 300 mg tablet Take day of surgery  • amitriptyline (ELAVIL) 50 mg tablet Take night before surgery   • ascorbic acid (VITAMIN C) 500 MG tablet Hold day of surgery  • aspirin (ECOTRIN LOW STRENGTH) 81 mg EC tablet Instructions provided by MD   • atorvastatin (LIPITOR) 40 mg tablet Hold day of surgery  • cholecalciferol (VITAMIN D3) 1,000 units tablet Hold day of surgery  • ezetimibe (ZETIA) 10 mg tablet Hold day of surgery  • folic acid (FOLVITE) 1 mg tablet Hold day of surgery  • HYDROcodone-acetaminophen (NORCO) 5-325 mg per tablet Uses PRN- OK to take day of surgery   • Icosapent Ethyl 1 g CAPS Hold day of surgery  • Klor-Con M20 20 MEQ tablet Hold day of surgery  • Lantus SoloStar 100 units/mL injection pen Hold day of surgery  • Magnesium 400 MG TABS Hold day of surgery  • meloxicam (Mobic) 15 mg tablet Stop taking 7 days prior to surgery  • Multiple Vitamins-Minerals (Multivitamin Men 50+) TABS Hold day of surgery  • nebivolol (BYSTOLIC) 2 5 mg tablet Take day of surgery  • NIFEdipine (PROCARDIA XL) 30 mg 24 hr tablet Take day of surgery  • NovoLOG FlexPen 100 units/mL injection pen Hold day of surgery  • simvastatin (ZOCOR) 20 mg tablet Hold day of surgery  Ortho class reviewed  NPO after midnight, meds in am with sips of water  Understands when to expect preop phone call  Remove all jewelry  Advised of visitation policy  Before your operation, you play an important role in decreasing your risk for infection by washing with special antiseptic soap  This is an effective way to reduce bacteria on the skin which may help to prevent infections at the surgical site  Please read the following directions in advance  1   In the week before your operation purchase a 4 ounce bottle of antiseptic soap containing chlorhexidine gluconate 4%  Some brand names include: Aplicare, Endure, and Hibiclens  The cost is usually less than $5 00  · For your convenience, the [address] [place] [address] carries the soap  · It may also be available at your doctor's office or pre-admission testing center, and at most retail pharmacies  · If you are allergic or sensitive to soaps containing chlorhexidine gluconate (CHG), please let your doctor know so another antiseptic soap can be suggested  · CHG antiseptic soap is for external use only  2      The day before your operation follow these directions carefully to get ready  · Place clean lines (sheets) on your bed; you should sleep on clean sheets after your evening shower  · Get clean towels and washcloths ready - you need enough for 2 showers  · Set aside clean underwear, pajamas, and clothing to wear after the shower  Reminders:  · DO NOT use any other soap or body rinse on your skin during or after the antiseptic showers  · DO NOT use lotion , powder, deodorant, or perfume/aftershave of any kind on your skin after your antiseptic shower  · DO NOT shave any body parts in the 24 hours/the day before your operation  · DO NOT get the antiseptic soap in your eyes, ears, nose, mouth, or vaginal area  3      You will need to shower the night before AND the morning of your Surgery  Shower 1:  · The evening before your operation, take the fist shower  · First, shampoo your hair with regular shampoo and rinse it completely before you use the anitseptic soap  After washing and rinsing your hair, rinse your body  · Next, use a clean wash cloth to apply the antiseptic soap and wash your body from the neck down to your toes using 1/2 bottle of the antiseptic soap  You will use the other 1/2 bottle for the second shower  · Clean the area where your incision will be; later this area well for about 2 minutes    · If you ar having head or neck surgery, wash areas with the antiseptic soap   · Rinse yourself completely with running water  · Use a clean towel to dry off  · Wear clean underwear and clothing/pajamas  Shower 2:  · The Morning of your operation, take the second shower following the same steps as Shower 1 using the second 1/2 of the bottle of antiseptic soap  · Use clean cloths and towels to was and dry yourself off  · Wear clean underwear and clothing

## 2022-11-02 LAB
DME PARACHUTE DELIVERY DATE ACTUAL: NORMAL
DME PARACHUTE DELIVERY DATE REQUESTED: NORMAL
DME PARACHUTE ITEM DESCRIPTION: NORMAL
DME PARACHUTE ORDER STATUS: NORMAL
DME PARACHUTE SUPPLIER NAME: NORMAL
DME PARACHUTE SUPPLIER PHONE: NORMAL

## 2022-11-04 ENCOUNTER — APPOINTMENT (OUTPATIENT)
Dept: LAB | Facility: HOSPITAL | Age: 72
End: 2022-11-04

## 2022-11-04 ENCOUNTER — OFFICE VISIT (OUTPATIENT)
Dept: FAMILY MEDICINE CLINIC | Facility: HOSPITAL | Age: 72
End: 2022-11-04

## 2022-11-04 VITALS
OXYGEN SATURATION: 97 % | BODY MASS INDEX: 29.26 KG/M2 | SYSTOLIC BLOOD PRESSURE: 138 MMHG | HEIGHT: 70 IN | WEIGHT: 204.4 LBS | HEART RATE: 77 BPM | DIASTOLIC BLOOD PRESSURE: 72 MMHG

## 2022-11-04 DIAGNOSIS — Z79.4 TYPE 2 DIABETES MELLITUS WITH HYPERGLYCEMIA, WITH LONG-TERM CURRENT USE OF INSULIN (HCC): ICD-10-CM

## 2022-11-04 DIAGNOSIS — I25.10 CORONARY ARTERY DISEASE INVOLVING NATIVE CORONARY ARTERY OF NATIVE HEART WITHOUT ANGINA PECTORIS: ICD-10-CM

## 2022-11-04 DIAGNOSIS — M16.11 PRIMARY OSTEOARTHRITIS OF RIGHT HIP: ICD-10-CM

## 2022-11-04 DIAGNOSIS — Z01.810 PREOP CARDIOVASCULAR EXAM: Primary | ICD-10-CM

## 2022-11-04 DIAGNOSIS — M10.9 GOUTY ARTHRITIS: ICD-10-CM

## 2022-11-04 DIAGNOSIS — E11.42 DIABETIC POLYNEUROPATHY ASSOCIATED WITH TYPE 2 DIABETES MELLITUS (HCC): ICD-10-CM

## 2022-11-04 DIAGNOSIS — E11.65 TYPE 2 DIABETES MELLITUS WITH HYPERGLYCEMIA, WITH LONG-TERM CURRENT USE OF INSULIN (HCC): ICD-10-CM

## 2022-11-04 DIAGNOSIS — I10 PRIMARY HYPERTENSION: ICD-10-CM

## 2022-11-04 DIAGNOSIS — Z01.818 ENCOUNTER FOR PREADMISSION TESTING: ICD-10-CM

## 2022-11-04 PROBLEM — Z87.448 HX OF CHRONIC KIDNEY DISEASE: Status: ACTIVE | Noted: 2021-09-13

## 2022-11-04 PROBLEM — M75.111 INCOMPLETE TEAR OF RIGHT ROTATOR CUFF: Status: ACTIVE | Noted: 2021-12-09

## 2022-11-04 PROBLEM — M17.10 PRIMARY OSTEOARTHRITIS OF KNEE: Status: ACTIVE | Noted: 2019-12-18

## 2022-11-04 PROBLEM — I71.20 THORACIC AORTIC ANEURYSM WITHOUT RUPTURE: Status: ACTIVE | Noted: 2022-02-02

## 2022-11-04 LAB
ABO GROUP BLD: NORMAL
BLD GP AB SCN SERPL QL: NEGATIVE
RH BLD: POSITIVE
SPECIMEN EXPIRATION DATE: NORMAL

## 2022-11-04 NOTE — PROGRESS NOTES
Subjective:     Mik Aguilar is a 70 y o  male who presents to the office today for a preoperative consultation at the request of surgeon Ashley Argueta who plans on performing  Right thr on November 9      Prior anesthesia adverse reactions - None    Exercise capacity - Able to walk 4 blocks or climb 2 flights of stairs without symptoms - Yes    Easy bleeding/bruising - No    Chest pain - No    Dyspnea, wheezing, cough - No    Sleep apnea - No    HPI    Past Medical History:   Diagnosis Date   • Arthritis    • Cervical disc disease     C2-7, needs repair   • Coronary artery disease     angina is pain in throat   • 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   • Hypertension    • TIA (transient ischemic attack)     X 2         Past Surgical History:   Procedure Laterality Date   • ANKLE ARTHROSCOPY Bilateral    • APPENDECTOMY     • CATARACT EXTRACTION, BILATERAL Bilateral    • CHOLECYSTECTOMY     • CORONARY ANGIOPLASTY WITH STENT PLACEMENT      2 stents in LAD   • ELBOW ARTHROSCOPY Bilateral     with right elbow tear repaired   • ERCP     • KNEE ARTHROSCOPY Right    • LUMBAR LAMINECTOMY      5 times   • MULTIPLE TOOTH EXTRACTIONS     • NOSE SURGERY  2021    fracture post a fall   • REPLACEMENT TOTAL KNEE Left    • SINUS SURGERY     • THUMB FUSION Bilateral     thumb repair with bone grafts   • TONSILLECTOMY AND ADENOIDECTOMY           Family History   Problem Relation Age of Onset   • Uterine cancer Mother    • Colon cancer Father    • Cancer Sister         corneal   • No Known Problems Brother          Social History     Tobacco Use   • Smoking status: Former Smoker   • Smokeless tobacco: Never Used   Vaping Use   • Vaping Use: Never used   Substance Use Topics   • Alcohol use: Yes     Comment: occasionally a glass of wine once a month    • Drug use: Not Currently       Current Outpatient Medications   Medication Sig Dispense Refill   • allopurinol (ZYLOPRIM) 100 mg tablet Take 100 mg by mouth daily     • allopurinol (ZYLOPRIM) 300 mg tablet Take 300 mg by mouth daily     • amitriptyline (ELAVIL) 50 mg tablet Take 50 mg by mouth daily at bedtime       • ascorbic acid (VITAMIN C) 500 MG tablet Take 1 tablet (500 mg total) by mouth 2 (two) times a day 30 tablet 3   • atorvastatin (LIPITOR) 40 mg tablet Take 40 mg by mouth daily     • Blood Glucose Monitoring Suppl (Accu-Chek Guide Me) w/Device KIT Check blood sugars 3 times daily 1 kit 0   • cholecalciferol (VITAMIN D3) 1,000 units tablet Take 2 tablets (2,000 Units total) by mouth daily 30 tablet 0   • ezetimibe (ZETIA) 10 mg tablet Take 10 mg by mouth daily     • folic acid (FOLVITE) 1 mg tablet Take 1 tablet (1 mg total) by mouth daily 30 tablet 3   • HYDROcodone-acetaminophen (NORCO) 5-325 mg per tablet      • Klor-Con M20 20 MEQ tablet Take 20 mEq by mouth daily       • Lantus SoloStar 100 units/mL injection pen Inject 30 Units under the skin daily at bedtime 15 mL 2   • Magnesium 400 MG TABS Take by mouth daily       • Multiple Vitamins-Minerals (Multivitamin Men 50+) TABS Take by mouth daily     • nebivolol (BYSTOLIC) 2 5 mg tablet EVERY NIGHT TAKE 1 TABLET BY MOUTH EVERY DAY     • NIFEdipine (PROCARDIA XL) 30 mg 24 hr tablet Take 60 mg by mouth every evening     • NovoLOG FlexPen 100 units/mL injection pen 14 units 3 times daily with meals 15 mL 2   • aspirin (ECOTRIN LOW STRENGTH) 81 mg EC tablet Take 81 mg by mouth daily (Patient not taking: Reported on 11/4/2022)       No current facility-administered medications for this visit         Nitroglycerin        {Common ambulatory [facility]:[id number]}      Review of Systems      Objective      /72   Pulse 77   Ht 5' 10" (1 778 m)   Wt 92 7 kg (204 lb 6 4 oz)   SpO2 97%   BMI 29 33 kg/m²     Physical Exam      Cardiographics  ECG:    Imaging       Lab Review                Plan:         Surgical Clearance - Cleared    Risk -  Controlled risk factors with cad and diabetes    Discussion/Summary: *** Lina Espinoza

## 2022-11-05 NOTE — H&P (VIEW-ONLY)
Assessment/Plan:     Diagnosis ICD-10-CM Associated Orders   1  Preop cardiovascular exam  Z01 810    2  Primary osteoarthritis of right hip  M16 11    3  Type 2 diabetes mellitus with hyperglycemia, with long-term current use of insulin ([facility])  E11 65     Z79 4    4  Diabetic polyneuropathy associated with type 2 diabetes mellitus ([facility])  E11 42    5  Primary hypertension  I10    6  Coronary artery disease involving native coronary artery of native heart without angina pectoris  I25 10    7  Gouty arthritis  M10 9        Problem List Items Addressed This Visit        Endocrine    Type 2 diabetes mellitus with hyperglycemia, with long-term current use of insulin ([facility] [address] )    Diabetic polyneuropathy associated with type 2 diabetes mellitus ([facility] [address] )       Cardiovascular and Mediastinum    Primary hypertension    CAD (coronary artery disease)     2008- had lad stents x2    No recent angina            Musculoskeletal and Integument    Gouty arthritis     Take ususal allopurinol night before surgery           Other Visit Diagnoses     Preop cardiovascular exam    -  Primary    Primary osteoarthritis of right hip                Return in about 3 months (around 2/4/2023) for Annual Medicare Wellness  Subjective:    Patient ID: Sallie Rodney is a 70 y o  male    Sallie Rodney is a 70 y o  male who presents to the office today for a preoperative consultation at the request of surgeon [place] who plans on performing  Right thr on November 9  First office appointment for this patient who was referred to us for primary care from Dr Armando Homans whom he sees for his diabetes-she is on longstanding insulin in uses 30 units of Lantus at hs and uses 14 units of Humalog with meals    He currently has a Dexcom for monitoring which has helped his overall control    Prior anesthesia adverse reactions - None    Exercise capacity -  no shortness of breath with activity but is limited due to his hip pain Easy bleeding/bruising - No    Chest pain - No    Dyspnea, wheezing, cough - No    Sleep apnea - No    First office visit for this patient was referred to us by Dr Adolph Matute who sees him for his diabetes-he currently has a Dexcom and has been reporting better control with recent increases in his insulin-he takes 30 units of Lantus at night and 14 units 3 times a day with meals  His hemoglobin A1c improved dramatically from 13 6-7 2% any is following with endocrinology closely  Patient has not had ongoing pain in his knees and had prior left knee replacement surgery  He has recently seen Dr Priscila Mills and x-rays were performed which showed significant degeneration of the right hip which is felt to be causing his upper leg pain which radiates into his knee currently  Plan is for total knee replacement surgery  Patient is currently a [address] [place] [address]  in a [employer]  He has worked in various capacities in the past in [address] [place] [address] and then became a   He is hoping to return to that position once he is ambulatory from his surgery    Patient has history of coronary artery disease with stents placed x2 in the LAD in 2008 but has not had any active angina-  EKG is stable today with left ventricular hypertrophy in 1st degree AV block but no acute changes are noted      The following portions of the patient's history were reviewed and updated as appropriate: allergies, current medications and problem list      Review of Systems   HENT: Negative for congestion and postnasal drip  Respiratory: Negative for shortness of breath  Cardiovascular: Negative for chest pain  Musculoskeletal: Positive for gait problem  Right leg pain and some lower back pain   All other systems reviewed and are negative          Objective:      Current Outpatient Medications:   •  allopurinol (ZYLOPRIM) 100 mg tablet, Take 100 mg by mouth daily, Disp: , Rfl:   •  allopurinol (ZYLOPRIM) 300 mg tablet, Take 300 mg by mouth daily, Disp: , Rfl:   •  amitriptyline (ELAVIL) 50 mg tablet, Take 50 mg by mouth daily at bedtime  , Disp: , Rfl:   •  ascorbic acid (VITAMIN C) 500 MG tablet, Take 1 tablet (500 mg total) by mouth 2 (two) times a day, Disp: 30 tablet, Rfl: 3  •  atorvastatin (LIPITOR) 40 mg tablet, Take 40 mg by mouth daily, Disp: , Rfl:   •  Blood Glucose Monitoring Suppl (Accu-Chek Guide Me) w/Device KIT, Check blood sugars 3 times daily, Disp: 1 kit, Rfl: 0  •  cholecalciferol (VITAMIN D3) 1,000 units tablet, Take 2 tablets (2,000 Units total) by mouth daily, Disp: 30 tablet, Rfl: 0  •  ezetimibe (ZETIA) 10 mg tablet, Take 10 mg by mouth daily, Disp: , Rfl:   •  folic acid (FOLVITE) 1 mg tablet, Take 1 tablet (1 mg total) by mouth daily, Disp: 30 tablet, Rfl: 3  •  HYDROcodone-acetaminophen (NORCO) 5-325 mg per tablet, , Disp: , Rfl:   •  Klor-Con M20 20 MEQ tablet, Take 20 mEq by mouth daily  , Disp: , Rfl:   •  Lantus SoloStar 100 units/mL injection pen, Inject 30 Units under the skin daily at bedtime, Disp: 15 mL, Rfl: 2  •  Magnesium 400 MG TABS, Take by mouth daily  , Disp: , Rfl:   •  Multiple Vitamins-Minerals (Multivitamin Men 50+) TABS, Take by mouth daily, Disp: , Rfl:   •  nebivolol (BYSTOLIC) 2 5 mg tablet, EVERY NIGHT TAKE 1 TABLET BY MOUTH EVERY DAY, Disp: , Rfl:   •  NIFEdipine (PROCARDIA XL) 30 mg 24 hr tablet, Take 60 mg by mouth every evening, Disp: , Rfl:   •  NovoLOG FlexPen 100 units/mL injection pen, 14 units 3 times daily with meals, Disp: 15 mL, Rfl: 2  •  aspirin (ECOTRIN LOW STRENGTH) 81 mg EC tablet, Take 81 mg by mouth daily (Patient not taking: Reported on 11/4/2022), Disp: , Rfl:     Blood pressure 138/72, pulse 77, height 5' 10" (1 778 m), weight 92 7 kg (204 lb 6 4 oz), SpO2 97 %  Physical Exam  Vitals and nursing note reviewed  Constitutional:       General: He is not in acute distress  Appearance: He is well-developed  HENT:      Head: Normocephalic and atraumatic        Ears:      Comments: Mild excess cerumen in the left     Nose: No congestion  Eyes:      General: No scleral icterus  Right eye: No discharge  Left eye: No discharge  Conjunctiva/sclera: Conjunctivae normal    Cardiovascular:      Rate and Rhythm: Normal rate and regular rhythm  Heart sounds: No murmur heard  No gallop  Pulmonary:      Effort: Pulmonary effort is normal  No respiratory distress  Breath sounds: Normal breath sounds  No rales  Abdominal:      General: There is no distension  Palpations: Abdomen is soft  Tenderness: There is no abdominal tenderness  There is no guarding  Musculoskeletal:         General: Tenderness present  Cervical back: Neck supple  Comments: Right anterior thigh growing tenderness with some radiation to the inner aspect of his  Right knee  Some increased pain with external rotation of the right hip   Skin:     General: Skin is warm and dry  Neurological:      General: No focal deficit present  Mental Status: He is alert and oriented to person, place, and time  Psychiatric:         Mood and Affect: Mood normal          Behavior: Behavior normal          Thought Content: Thought content normal          Judgment: Judgment normal       patient is medically cleared for the surgery and is low to moderate risk but I do not feel it is prohibitive  He was given instructions on taking his insulin by the preop team and would recommend overnight stay to monitor his blood sugars after surgery  His meloxicam and aspirin are currently on hold  I would recommend that he does take his Bystolic and nifedipine on the a m   Of surgery with sips of fluids

## 2022-11-05 NOTE — PROGRESS NOTES
Assessment/Plan:     Diagnosis ICD-10-CM Associated Orders   1  Preop cardiovascular exam  Z01 810    2  Primary osteoarthritis of right hip  M16 11    3  Type 2 diabetes mellitus with hyperglycemia, with long-term current use of insulin ([facility])  E11 65     Z79 4    4  Diabetic polyneuropathy associated with type 2 diabetes mellitus ([facility])  E11 42    5  Primary hypertension  I10    6  Coronary artery disease involving native coronary artery of native heart without angina pectoris  I25 10    7  Gouty arthritis  M10 9        Problem List Items Addressed This Visit        Endocrine    Type 2 diabetes mellitus with hyperglycemia, with long-term current use of insulin ([facility] [address] )    Diabetic polyneuropathy associated with type 2 diabetes mellitus ([facility] [address] )       Cardiovascular and Mediastinum    Primary hypertension    CAD (coronary artery disease)     2008- had lad stents x2    No recent angina            Musculoskeletal and Integument    Gouty arthritis     Take ususal allopurinol night before surgery           Other Visit Diagnoses     Preop cardiovascular exam    -  Primary    Primary osteoarthritis of right hip                Return in about 3 months (around 2/4/2023) for Annual Medicare Wellness  Subjective:    Patient ID: Mikel Guerin is a 70 y o  male    Mikel Guerin is a 70 y o  male who presents to the office today for a preoperative consultation at the request of surgeon Mary Avalos who plans on performing  Right thr on November 9  First office appointment for this patient who was referred to us for primary care from Dr Ledy Roman whom he sees for his diabetes-she is on longstanding insulin in uses 30 units of Lantus at hs and uses 14 units of Humalog with meals    He currently has a Dexcom for monitoring which has helped his overall control    Prior anesthesia adverse reactions - None    Exercise capacity -  no shortness of breath with activity but is limited due to his hip pain Easy bleeding/bruising - No    Chest pain - No    Dyspnea, wheezing, cough - No    Sleep apnea - No    First office visit for this patient was referred to us by Dr Noel Ceja who sees him for his diabetes-he currently has a Dexcom and has been reporting better control with recent increases in his insulin-he takes 30 units of Lantus at night and 14 units 3 times a day with meals  His hemoglobin A1c improved dramatically from 13 6-7 2% any is following with endocrinology closely  Patient has not had ongoing pain in his knees and had prior left knee replacement surgery  He has recently seen Dr Yaima Blood and x-rays were performed which showed significant degeneration of the right hip which is felt to be causing his upper leg pain which radiates into his knee currently  Plan is for total knee replacement surgery  Patient is currently a [address] [place] [address]  in a [employer]  He has worked in various capacities in the past in [address] [place] [address] and then became a   He is hoping to return to that position once he is ambulatory from his surgery    Patient has history of coronary artery disease with stents placed x2 in the LAD in 2008 but has not had any active angina-  EKG is stable today with left ventricular hypertrophy in 1st degree AV block but no acute changes are noted      The following portions of the patient's history were reviewed and updated as appropriate: allergies, current medications and problem list      Review of Systems   HENT: Negative for congestion and postnasal drip  Respiratory: Negative for shortness of breath  Cardiovascular: Negative for chest pain  Musculoskeletal: Positive for gait problem  Right leg pain and some lower back pain   All other systems reviewed and are negative          Objective:      Current Outpatient Medications:   •  allopurinol (ZYLOPRIM) 100 mg tablet, Take 100 mg by mouth daily, Disp: , Rfl:   •  allopurinol (ZYLOPRIM) 300 mg tablet, Take 300 mg by mouth daily, Disp: , Rfl:   •  amitriptyline (ELAVIL) 50 mg tablet, Take 50 mg by mouth daily at bedtime  , Disp: , Rfl:   •  ascorbic acid (VITAMIN C) 500 MG tablet, Take 1 tablet (500 mg total) by mouth 2 (two) times a day, Disp: 30 tablet, Rfl: 3  •  atorvastatin (LIPITOR) 40 mg tablet, Take 40 mg by mouth daily, Disp: , Rfl:   •  Blood Glucose Monitoring Suppl (Accu-Chek Guide Me) w/Device KIT, Check blood sugars 3 times daily, Disp: 1 kit, Rfl: 0  •  cholecalciferol (VITAMIN D3) 1,000 units tablet, Take 2 tablets (2,000 Units total) by mouth daily, Disp: 30 tablet, Rfl: 0  •  ezetimibe (ZETIA) 10 mg tablet, Take 10 mg by mouth daily, Disp: , Rfl:   •  folic acid (FOLVITE) 1 mg tablet, Take 1 tablet (1 mg total) by mouth daily, Disp: 30 tablet, Rfl: 3  •  HYDROcodone-acetaminophen (NORCO) 5-325 mg per tablet, , Disp: , Rfl:   •  Klor-Con M20 20 MEQ tablet, Take 20 mEq by mouth daily  , Disp: , Rfl:   •  Lantus SoloStar 100 units/mL injection pen, Inject 30 Units under the skin daily at bedtime, Disp: 15 mL, Rfl: 2  •  Magnesium 400 MG TABS, Take by mouth daily  , Disp: , Rfl:   •  Multiple Vitamins-Minerals (Multivitamin Men 50+) TABS, Take by mouth daily, Disp: , Rfl:   •  nebivolol (BYSTOLIC) 2 5 mg tablet, EVERY NIGHT TAKE 1 TABLET BY MOUTH EVERY DAY, Disp: , Rfl:   •  NIFEdipine (PROCARDIA XL) 30 mg 24 hr tablet, Take 60 mg by mouth every evening, Disp: , Rfl:   •  NovoLOG FlexPen 100 units/mL injection pen, 14 units 3 times daily with meals, Disp: 15 mL, Rfl: 2  •  aspirin (ECOTRIN LOW STRENGTH) 81 mg EC tablet, Take 81 mg by mouth daily (Patient not taking: Reported on 11/4/2022), Disp: , Rfl:     Blood pressure 138/72, pulse 77, height 5' 10" (1 778 m), weight 92 7 kg (204 lb 6 4 oz), SpO2 97 %  Physical Exam  Vitals and nursing note reviewed  Constitutional:       General: He is not in acute distress  Appearance: He is well-developed  HENT:      Head: Normocephalic and atraumatic        Ears:      Comments: Mild excess cerumen in the left     Nose: No congestion  Eyes:      General: No scleral icterus  Right eye: No discharge  Left eye: No discharge  Conjunctiva/sclera: Conjunctivae normal    Cardiovascular:      Rate and Rhythm: Normal rate and regular rhythm  Heart sounds: No murmur heard  No gallop  Pulmonary:      Effort: Pulmonary effort is normal  No respiratory distress  Breath sounds: Normal breath sounds  No rales  Abdominal:      General: There is no distension  Palpations: Abdomen is soft  Tenderness: There is no abdominal tenderness  There is no guarding  Musculoskeletal:         General: Tenderness present  Cervical back: Neck supple  Comments: Right anterior thigh growing tenderness with some radiation to the inner aspect of his  Right knee  Some increased pain with external rotation of the right hip   Skin:     General: Skin is warm and dry  Neurological:      General: No focal deficit present  Mental Status: He is alert and oriented to person, place, and time  Psychiatric:         Mood and Affect: Mood normal          Behavior: Behavior normal          Thought Content: Thought content normal          Judgment: Judgment normal       patient is medically cleared for the surgery and is low to moderate risk but I do not feel it is prohibitive  He was given instructions on taking his insulin by the preop team and would recommend overnight stay to monitor his blood sugars after surgery  His meloxicam and aspirin are currently on hold  I would recommend that he does take his Bystolic and nifedipine on the a m   Of surgery with sips of fluids

## 2022-11-08 NOTE — ANESTHESIA PREPROCEDURE EVALUATION
Procedure:  ARTHROPLASTY HIP TOTAL ANTERIOR,NAVIGATED- SAME DAY (Right Hip)   Endocrine                    Cardiovascular and Mediastinum     Primary hypertension     CAD (coronary artery disease)       2008- had lad stents x2    No recent angina      As per Dr FERNANDO Brooks's note of 11/4/22    Relevant Problems   CARDIO   (+) CAD (coronary artery disease)   (+) Hypercholesterolemia with hypertriglyceridemia   (+) Primary hypertension   (+) Thoracic aortic aneurysm without rupture      ENDO   (+) Type 2 diabetes mellitus with hyperglycemia, with long-term current use of insulin (HCC)      /RENAL   (+) Stage 3 chronic kidney disease, unspecified whether stage 3a or 3b CKD (HCC)      MUSCULOSKELETAL   (+) Gouty arthritis   (+) Osteoarthrosis, hand   (+) Primary osteoarthritis of one hip, right      NEURO/PSYCH   (+) Diabetic polyneuropathy associated with type 2 diabetes mellitus (HCC)   L2-5 Lumbar Laminectomies     Physical Exam    Airway    Mallampati score: II  TM Distance: >3 FB  Neck ROM: full     Dental   lower dentures and upper dentures,     Cardiovascular      Pulmonary      Other Findings        Anesthesia Plan  ASA Score- 3     Anesthesia Type- general with ASA Monitors  Additional Monitors:   Airway Plan: ETT  Comment: Spinal not indicated due to Lumbar Laminectonies  Plan Factors-Exercise tolerance (METS): >4 METS  Chart reviewed  Patient is not a current smoker  Obstructive sleep apnea risk education given perioperatively  Induction- intravenous  Postoperative Plan-     Informed Consent- Anesthetic plan and risks discussed with patient and spouse  I personally reviewed this patient with the CRNA  Discussed and agreed on the Anesthesia Plan with the CRNA       5/22  Moderate asymmetric septal hypertrophy, normal chamber size, normal   systolic function, ejection fraction 60-65%   No segmental abnormalities appreciated   Diastolic function is indeterminate   Normal right ventricular size and function   Normal atrial sizes   Mild mitral regurgitation   Aortic sclerosis without stenosis or regurgitation   Mild tricuspid regurgitation with a normal pulmonary pressure of 31 mm Hg   The aortic root is mildly to moderately dilated at 4 4 cm the ascending   aorta is moderately dilated at 4 5 cm   No prior study for comparison     CrEAT  -1 47 ON 10/25/22  Lab Results   Component Value Date    GLUC 190 (H) 11/09/2022    GLUF 190 (H) 11/09/2022     (H) 11/09/2022    AST 45 11/09/2022    BUN 25 11/09/2022    CALCIUM 8 8 11/09/2022     11/09/2022    CO2 25 11/09/2022    CREATININE 1 63 (H) 11/09/2022    HCT 39 0 11/09/2022    HGB 13 2 11/09/2022    HGBA1C 7 5 07/29/2022     11/09/2022    K 3 9 11/09/2022    WBC 7 99 11/09/2022

## 2022-11-09 ENCOUNTER — ANESTHESIA (OUTPATIENT)
Dept: PERIOP | Facility: HOSPITAL | Age: 72
End: 2022-11-09

## 2022-11-09 ENCOUNTER — HOSPITAL ENCOUNTER (OUTPATIENT)
Facility: HOSPITAL | Age: 72
Setting detail: OUTPATIENT SURGERY
Discharge: HOME | End: 2022-11-10
Attending: STUDENT IN AN ORGANIZED HEALTH CARE EDUCATION/TRAINING PROGRAM | Admitting: STUDENT IN AN ORGANIZED HEALTH CARE EDUCATION/TRAINING PROGRAM

## 2022-11-09 ENCOUNTER — APPOINTMENT (OUTPATIENT)
Dept: RADIOLOGY | Facility: HOSPITAL | Age: 72
End: 2022-11-09

## 2022-11-09 DIAGNOSIS — M16.11 PRIMARY OSTEOARTHRITIS OF ONE HIP, RIGHT: ICD-10-CM

## 2022-11-09 DIAGNOSIS — E11.42 DIABETIC POLYNEUROPATHY ASSOCIATED WITH TYPE 2 DIABETES MELLITUS (HCC): ICD-10-CM

## 2022-11-09 DIAGNOSIS — N18.30 STAGE 3 CHRONIC KIDNEY DISEASE, UNSPECIFIED WHETHER STAGE 3A OR 3B CKD (HCC): Primary | ICD-10-CM

## 2022-11-09 LAB
ABO GROUP BLD: NORMAL
ALBUMIN SERPL BCP-MCNC: 3.7 G/DL (ref 3.5–5)
ALBUMIN SERPL BCP-MCNC: 4.1 G/DL (ref 3.5–5)
ALP SERPL-CCNC: 181 U/L (ref 46–116)
ALP SERPL-CCNC: 189 U/L (ref 46–116)
ALT SERPL W P-5'-P-CCNC: 103 U/L (ref 12–78)
ALT SERPL W P-5'-P-CCNC: 153 U/L (ref 12–78)
ANION GAP SERPL CALCULATED.3IONS-SCNC: 10 MMOL/L (ref 4–13)
ANION GAP SERPL CALCULATED.3IONS-SCNC: 11 MMOL/L (ref 4–13)
APTT PPP: 29 SECONDS (ref 23–37)
AST SERPL W P-5'-P-CCNC: 184 U/L (ref 5–45)
AST SERPL W P-5'-P-CCNC: 45 U/L (ref 5–45)
BILIRUB SERPL-MCNC: 0.6 MG/DL (ref 0.2–1)
BILIRUB SERPL-MCNC: 1.3 MG/DL (ref 0.2–1)
BUN SERPL-MCNC: 25 MG/DL (ref 5–25)
BUN SERPL-MCNC: 27 MG/DL (ref 5–25)
CALCIUM SERPL-MCNC: 8.5 MG/DL (ref 8.3–10.1)
CALCIUM SERPL-MCNC: 8.8 MG/DL (ref 8.3–10.1)
CHLORIDE SERPL-SCNC: 102 MMOL/L (ref 96–108)
CHLORIDE SERPL-SCNC: 103 MMOL/L (ref 96–108)
CO2 SERPL-SCNC: 25 MMOL/L (ref 21–32)
CO2 SERPL-SCNC: 25 MMOL/L (ref 21–32)
CREAT SERPL-MCNC: 1.63 MG/DL (ref 0.6–1.3)
CREAT SERPL-MCNC: 1.95 MG/DL (ref 0.6–1.3)
ERYTHROCYTE [DISTWIDTH] IN BLOOD BY AUTOMATED COUNT: 13.2 % (ref 11.6–15.1)
ERYTHROCYTE [DISTWIDTH] IN BLOOD BY AUTOMATED COUNT: 13.4 % (ref 11.6–15.1)
GFR SERPL CREATININE-BSD FRML MDRD: 33 ML/MIN/1.73SQ M
GFR SERPL CREATININE-BSD FRML MDRD: 41 ML/MIN/1.73SQ M
GLUCOSE P FAST SERPL-MCNC: 190 MG/DL (ref 65–99)
GLUCOSE P FAST SERPL-MCNC: 264 MG/DL (ref 65–99)
GLUCOSE SERPL-MCNC: 190 MG/DL (ref 65–140)
GLUCOSE SERPL-MCNC: 194 MG/DL (ref 65–140)
GLUCOSE SERPL-MCNC: 210 MG/DL (ref 65–140)
GLUCOSE SERPL-MCNC: 233 MG/DL (ref 65–140)
GLUCOSE SERPL-MCNC: 264 MG/DL (ref 65–140)
GLUCOSE SERPL-MCNC: 280 MG/DL (ref 65–140)
HCT VFR BLD AUTO: 31.7 % (ref 36.5–49.3)
HCT VFR BLD AUTO: 39 % (ref 36.5–49.3)
HGB BLD-MCNC: 10.6 G/DL (ref 12–17)
HGB BLD-MCNC: 13.2 G/DL (ref 12–17)
INR PPP: 1 (ref 0.84–1.19)
MCH RBC QN AUTO: 31.5 PG (ref 26.8–34.3)
MCH RBC QN AUTO: 31.9 PG (ref 26.8–34.3)
MCHC RBC AUTO-ENTMCNC: 33.4 G/DL (ref 31.4–37.4)
MCHC RBC AUTO-ENTMCNC: 33.8 G/DL (ref 31.4–37.4)
MCV RBC AUTO: 94 FL (ref 82–98)
MCV RBC AUTO: 94 FL (ref 82–98)
PLATELET # BLD AUTO: 238 THOUSANDS/UL (ref 149–390)
PLATELET # BLD AUTO: 275 THOUSANDS/UL (ref 149–390)
PMV BLD AUTO: 9.3 FL (ref 8.9–12.7)
PMV BLD AUTO: 9.8 FL (ref 8.9–12.7)
POTASSIUM SERPL-SCNC: 3.9 MMOL/L (ref 3.5–5.3)
POTASSIUM SERPL-SCNC: 4.5 MMOL/L (ref 3.5–5.3)
PROT SERPL-MCNC: 6.8 G/DL (ref 6.4–8.4)
PROT SERPL-MCNC: 8 G/DL (ref 6.4–8.4)
PROTHROMBIN TIME: 13.9 SECONDS (ref 11.6–14.5)
RBC # BLD AUTO: 3.36 MILLION/UL (ref 3.88–5.62)
RBC # BLD AUTO: 4.14 MILLION/UL (ref 3.88–5.62)
RH BLD: POSITIVE
SODIUM SERPL-SCNC: 138 MMOL/L (ref 135–147)
SODIUM SERPL-SCNC: 138 MMOL/L (ref 135–147)
WBC # BLD AUTO: 12.15 THOUSAND/UL (ref 4.31–10.16)
WBC # BLD AUTO: 7.99 THOUSAND/UL (ref 4.31–10.16)

## 2022-11-09 DEVICE — ACTIS DUOFIX HIP PROSTHESIS (FEMORAL STEM 12/14 TAPER CEMENTLESS SIZE 6 HIGH COLLAR)  CE
Type: IMPLANTABLE DEVICE | Site: HIP | Status: FUNCTIONAL
Brand: ACTIS

## 2022-11-09 DEVICE — PINNACLE HIP SOLUTIONS ALTRX POLYETHYLENE ACETABULAR LINER NEUTRAL 36MM ID 54MM OD
Type: IMPLANTABLE DEVICE | Site: HIP | Status: FUNCTIONAL
Brand: PINNACLE ALTRX

## 2022-11-09 DEVICE — PINNACLE POROCOAT ACETABULAR SHELL SECTOR II 54MM OD
Type: IMPLANTABLE DEVICE | Site: HIP | Status: FUNCTIONAL
Brand: PINNACLE POROCOAT

## 2022-11-09 DEVICE — BIOLOX DELTA CERAMIC FEMORAL HEAD +5.0 36MM DIA 12/14 TAPER
Type: IMPLANTABLE DEVICE | Site: HIP | Status: FUNCTIONAL
Brand: BIOLOX DELTA

## 2022-11-09 RX ORDER — SENNOSIDES 8.6 MG
1 TABLET ORAL DAILY
Status: DISCONTINUED | OUTPATIENT
Start: 2022-11-09 | End: 2022-11-10 | Stop reason: HOSPADM

## 2022-11-09 RX ORDER — ONDANSETRON 2 MG/ML
INJECTION INTRAMUSCULAR; INTRAVENOUS AS NEEDED
Status: DISCONTINUED | OUTPATIENT
Start: 2022-11-09 | End: 2022-11-09

## 2022-11-09 RX ORDER — SODIUM CHLORIDE, SODIUM LACTATE, POTASSIUM CHLORIDE, CALCIUM CHLORIDE 600; 310; 30; 20 MG/100ML; MG/100ML; MG/100ML; MG/100ML
125 INJECTION, SOLUTION INTRAVENOUS CONTINUOUS
Status: DISCONTINUED | OUTPATIENT
Start: 2022-11-09 | End: 2022-11-09

## 2022-11-09 RX ORDER — INSULIN LISPRO 100 [IU]/ML
1-6 INJECTION, SOLUTION INTRAVENOUS; SUBCUTANEOUS
Status: DISCONTINUED | OUTPATIENT
Start: 2022-11-09 | End: 2022-11-10 | Stop reason: HOSPADM

## 2022-11-09 RX ORDER — NEBIVOLOL 2.5 MG/1
2.5 TABLET ORAL DAILY
Status: DISCONTINUED | OUTPATIENT
Start: 2022-11-09 | End: 2022-11-10 | Stop reason: HOSPADM

## 2022-11-09 RX ORDER — ONDANSETRON 2 MG/ML
4 INJECTION INTRAMUSCULAR; INTRAVENOUS EVERY 6 HOURS PRN
Status: DISCONTINUED | OUTPATIENT
Start: 2022-11-09 | End: 2022-11-10 | Stop reason: HOSPADM

## 2022-11-09 RX ORDER — ONDANSETRON 4 MG/1
4 TABLET, ORALLY DISINTEGRATING ORAL EVERY 6 HOURS PRN
Qty: 20 TABLET | Refills: 0 | Status: SHIPPED | OUTPATIENT
Start: 2022-11-09

## 2022-11-09 RX ORDER — CELECOXIB 100 MG/1
100 CAPSULE ORAL 2 TIMES DAILY
Qty: 60 CAPSULE | Refills: 0 | Status: SHIPPED | OUTPATIENT
Start: 2022-11-09 | End: 2022-11-09

## 2022-11-09 RX ORDER — MAGNESIUM HYDROXIDE/ALUMINUM HYDROXICE/SIMETHICONE 120; 1200; 1200 MG/30ML; MG/30ML; MG/30ML
30 SUSPENSION ORAL EVERY 6 HOURS PRN
Status: DISCONTINUED | OUTPATIENT
Start: 2022-11-09 | End: 2022-11-10 | Stop reason: HOSPADM

## 2022-11-09 RX ORDER — HYDROMORPHONE HCL/PF 1 MG/ML
SYRINGE (ML) INJECTION AS NEEDED
Status: DISCONTINUED | OUTPATIENT
Start: 2022-11-09 | End: 2022-11-09

## 2022-11-09 RX ORDER — ALLOPURINOL 300 MG/1
300 TABLET ORAL DAILY
Status: DISCONTINUED | OUTPATIENT
Start: 2022-11-09 | End: 2022-11-10 | Stop reason: HOSPADM

## 2022-11-09 RX ORDER — ALBUMIN, HUMAN INJ 5% 5 %
SOLUTION INTRAVENOUS CONTINUOUS PRN
Status: DISCONTINUED | OUTPATIENT
Start: 2022-11-09 | End: 2022-11-09

## 2022-11-09 RX ORDER — INSULIN GLARGINE 100 [IU]/ML
30 INJECTION, SOLUTION SUBCUTANEOUS
Status: DISCONTINUED | OUTPATIENT
Start: 2022-11-09 | End: 2022-11-10 | Stop reason: HOSPADM

## 2022-11-09 RX ORDER — ATORVASTATIN CALCIUM 40 MG/1
40 TABLET, FILM COATED ORAL DAILY
Status: DISCONTINUED | OUTPATIENT
Start: 2022-11-09 | End: 2022-11-10 | Stop reason: HOSPADM

## 2022-11-09 RX ORDER — POTASSIUM CHLORIDE 20 MEQ/1
20 TABLET, EXTENDED RELEASE ORAL DAILY
Status: DISCONTINUED | OUTPATIENT
Start: 2022-11-09 | End: 2022-11-10 | Stop reason: HOSPADM

## 2022-11-09 RX ORDER — FENTANYL CITRATE 50 UG/ML
INJECTION, SOLUTION INTRAMUSCULAR; INTRAVENOUS AS NEEDED
Status: DISCONTINUED | OUTPATIENT
Start: 2022-11-09 | End: 2022-11-09

## 2022-11-09 RX ORDER — CEFADROXIL 500 MG/1
500 CAPSULE ORAL EVERY 12 HOURS SCHEDULED
Qty: 10 CAPSULE | Refills: 0 | Status: SHIPPED | OUTPATIENT
Start: 2022-11-09 | End: 2022-11-14

## 2022-11-09 RX ORDER — MAGNESIUM HYDROXIDE 1200 MG/15ML
LIQUID ORAL AS NEEDED
Status: DISCONTINUED | OUTPATIENT
Start: 2022-11-09 | End: 2022-11-09 | Stop reason: HOSPADM

## 2022-11-09 RX ORDER — CHLORHEXIDINE GLUCONATE 0.12 MG/ML
15 RINSE ORAL ONCE
Status: COMPLETED | OUTPATIENT
Start: 2022-11-09 | End: 2022-11-09

## 2022-11-09 RX ORDER — EZETIMIBE 10 MG/1
10 TABLET ORAL DAILY
Status: DISCONTINUED | OUTPATIENT
Start: 2022-11-09 | End: 2022-11-10 | Stop reason: HOSPADM

## 2022-11-09 RX ORDER — SENNOSIDES 8.6 MG
650 CAPSULE ORAL EVERY 8 HOURS PRN
Qty: 30 TABLET | Refills: 0 | Status: SHIPPED | OUTPATIENT
Start: 2022-11-09

## 2022-11-09 RX ORDER — GABAPENTIN 300 MG/1
300 CAPSULE ORAL ONCE
Status: COMPLETED | OUTPATIENT
Start: 2022-11-09 | End: 2022-11-09

## 2022-11-09 RX ORDER — FOLIC ACID 1 MG/1
1 TABLET ORAL DAILY
Status: DISCONTINUED | OUTPATIENT
Start: 2022-11-09 | End: 2022-11-10 | Stop reason: HOSPADM

## 2022-11-09 RX ORDER — ONDANSETRON 2 MG/ML
4 INJECTION INTRAMUSCULAR; INTRAVENOUS ONCE AS NEEDED
Status: DISCONTINUED | OUTPATIENT
Start: 2022-11-09 | End: 2022-11-09

## 2022-11-09 RX ORDER — ROCURONIUM BROMIDE 10 MG/ML
INJECTION, SOLUTION INTRAVENOUS AS NEEDED
Status: DISCONTINUED | OUTPATIENT
Start: 2022-11-09 | End: 2022-11-09

## 2022-11-09 RX ORDER — CEFAZOLIN SODIUM 1 G/50ML
1000 SOLUTION INTRAVENOUS EVERY 8 HOURS
Status: COMPLETED | OUTPATIENT
Start: 2022-11-09 | End: 2022-11-10

## 2022-11-09 RX ORDER — ASPIRIN 81 MG/1
81 TABLET ORAL 2 TIMES DAILY
Status: DISCONTINUED | OUTPATIENT
Start: 2022-11-09 | End: 2022-11-10 | Stop reason: HOSPADM

## 2022-11-09 RX ORDER — ASPIRIN 81 MG/1
81 TABLET ORAL 2 TIMES DAILY
Qty: 60 TABLET | Refills: 0 | Status: SHIPPED | OUTPATIENT
Start: 2022-11-09

## 2022-11-09 RX ORDER — OXYCODONE HYDROCHLORIDE 5 MG/1
10 TABLET ORAL EVERY 4 HOURS PRN
Status: DISCONTINUED | OUTPATIENT
Start: 2022-11-09 | End: 2022-11-10 | Stop reason: HOSPADM

## 2022-11-09 RX ORDER — HYDROMORPHONE HCL/PF 1 MG/ML
0.4 SYRINGE (ML) INJECTION
Status: DISCONTINUED | OUTPATIENT
Start: 2022-11-09 | End: 2022-11-09

## 2022-11-09 RX ORDER — AMOXICILLIN 250 MG
1 CAPSULE ORAL DAILY
Qty: 30 TABLET | Refills: 0 | Status: SHIPPED | OUTPATIENT
Start: 2022-11-09

## 2022-11-09 RX ORDER — CEFAZOLIN SODIUM 2 G/50ML
2000 SOLUTION INTRAVENOUS ONCE
Status: COMPLETED | OUTPATIENT
Start: 2022-11-09 | End: 2022-11-09

## 2022-11-09 RX ORDER — LIDOCAINE HYDROCHLORIDE 10 MG/ML
INJECTION, SOLUTION EPIDURAL; INFILTRATION; INTRACAUDAL; PERINEURAL AS NEEDED
Status: DISCONTINUED | OUTPATIENT
Start: 2022-11-09 | End: 2022-11-09

## 2022-11-09 RX ORDER — CALCIUM CARBONATE 200(500)MG
1000 TABLET,CHEWABLE ORAL DAILY PRN
Status: DISCONTINUED | OUTPATIENT
Start: 2022-11-09 | End: 2022-11-10 | Stop reason: HOSPADM

## 2022-11-09 RX ORDER — ASCORBIC ACID 500 MG
500 TABLET ORAL 2 TIMES DAILY
Status: DISCONTINUED | OUTPATIENT
Start: 2022-11-09 | End: 2022-11-10 | Stop reason: HOSPADM

## 2022-11-09 RX ORDER — INSULIN LISPRO 100 [IU]/ML
10 INJECTION, SOLUTION INTRAVENOUS; SUBCUTANEOUS ONCE
Status: COMPLETED | OUTPATIENT
Start: 2022-11-09 | End: 2022-11-09

## 2022-11-09 RX ORDER — SODIUM CHLORIDE, SODIUM LACTATE, POTASSIUM CHLORIDE, CALCIUM CHLORIDE 600; 310; 30; 20 MG/100ML; MG/100ML; MG/100ML; MG/100ML
100 INJECTION, SOLUTION INTRAVENOUS CONTINUOUS
Status: DISCONTINUED | OUTPATIENT
Start: 2022-11-09 | End: 2022-11-10

## 2022-11-09 RX ORDER — ACETAMINOPHEN 325 MG/1
975 TABLET ORAL ONCE
Status: COMPLETED | OUTPATIENT
Start: 2022-11-09 | End: 2022-11-09

## 2022-11-09 RX ORDER — OXYCODONE HYDROCHLORIDE 5 MG/1
5 TABLET ORAL EVERY 4 HOURS PRN
Status: DISCONTINUED | OUTPATIENT
Start: 2022-11-09 | End: 2022-11-10 | Stop reason: HOSPADM

## 2022-11-09 RX ORDER — ACETAMINOPHEN 325 MG/1
975 TABLET ORAL EVERY 8 HOURS
Status: DISCONTINUED | OUTPATIENT
Start: 2022-11-09 | End: 2022-11-10 | Stop reason: HOSPADM

## 2022-11-09 RX ORDER — BUPIVACAINE HYDROCHLORIDE AND EPINEPHRINE 2.5; 5 MG/ML; UG/ML
INJECTION, SOLUTION EPIDURAL; INFILTRATION; INTRACAUDAL; PERINEURAL AS NEEDED
Status: DISCONTINUED | OUTPATIENT
Start: 2022-11-09 | End: 2022-11-09 | Stop reason: HOSPADM

## 2022-11-09 RX ORDER — INSULIN LISPRO 100 [IU]/ML
14 INJECTION, SOLUTION INTRAVENOUS; SUBCUTANEOUS
Status: DISCONTINUED | OUTPATIENT
Start: 2022-11-09 | End: 2022-11-10 | Stop reason: HOSPADM

## 2022-11-09 RX ORDER — DEXAMETHASONE SODIUM PHOSPHATE 10 MG/ML
INJECTION, SOLUTION INTRAMUSCULAR; INTRAVENOUS AS NEEDED
Status: DISCONTINUED | OUTPATIENT
Start: 2022-11-09 | End: 2022-11-09

## 2022-11-09 RX ORDER — VANCOMYCIN HYDROCHLORIDE 1 G/20ML
INJECTION, POWDER, LYOPHILIZED, FOR SOLUTION INTRAVENOUS AS NEEDED
Status: DISCONTINUED | OUTPATIENT
Start: 2022-11-09 | End: 2022-11-09 | Stop reason: HOSPADM

## 2022-11-09 RX ORDER — OXYCODONE HYDROCHLORIDE 5 MG/1
5 TABLET ORAL EVERY 4 HOURS PRN
Qty: 42 TABLET | Refills: 0 | Status: SHIPPED | OUTPATIENT
Start: 2022-11-09 | End: 2022-11-19

## 2022-11-09 RX ORDER — MIDAZOLAM HYDROCHLORIDE 2 MG/2ML
INJECTION, SOLUTION INTRAMUSCULAR; INTRAVENOUS AS NEEDED
Status: DISCONTINUED | OUTPATIENT
Start: 2022-11-09 | End: 2022-11-09

## 2022-11-09 RX ORDER — AMITRIPTYLINE HYDROCHLORIDE 50 MG/1
50 TABLET, FILM COATED ORAL
Status: DISCONTINUED | OUTPATIENT
Start: 2022-11-09 | End: 2022-11-10 | Stop reason: HOSPADM

## 2022-11-09 RX ORDER — PROPOFOL 10 MG/ML
INJECTION, EMULSION INTRAVENOUS AS NEEDED
Status: DISCONTINUED | OUTPATIENT
Start: 2022-11-09 | End: 2022-11-09

## 2022-11-09 RX ORDER — ONDANSETRON 2 MG/ML
4 INJECTION INTRAMUSCULAR; INTRAVENOUS ONCE
Status: COMPLETED | OUTPATIENT
Start: 2022-11-09 | End: 2022-11-09

## 2022-11-09 RX ORDER — DOCUSATE SODIUM 100 MG/1
100 CAPSULE, LIQUID FILLED ORAL 2 TIMES DAILY
Status: DISCONTINUED | OUTPATIENT
Start: 2022-11-09 | End: 2022-11-10 | Stop reason: HOSPADM

## 2022-11-09 RX ORDER — CHLORHEXIDINE GLUCONATE 4 G/100ML
SOLUTION TOPICAL DAILY PRN
Status: DISCONTINUED | OUTPATIENT
Start: 2022-11-09 | End: 2022-11-09

## 2022-11-09 RX ORDER — GABAPENTIN 300 MG/1
300 CAPSULE ORAL
Status: DISCONTINUED | OUTPATIENT
Start: 2022-11-09 | End: 2022-11-10 | Stop reason: HOSPADM

## 2022-11-09 RX ORDER — MELATONIN
2000 DAILY
Status: DISCONTINUED | OUTPATIENT
Start: 2022-11-09 | End: 2022-11-10 | Stop reason: HOSPADM

## 2022-11-09 RX ORDER — NIFEDIPINE 30 MG/1
60 TABLET, EXTENDED RELEASE ORAL EVERY EVENING
Status: DISCONTINUED | OUTPATIENT
Start: 2022-11-09 | End: 2022-11-10 | Stop reason: HOSPADM

## 2022-11-09 RX ADMIN — PROPOFOL 150 MG: 10 INJECTION, EMULSION INTRAVENOUS at 07:40

## 2022-11-09 RX ADMIN — NEBIVOLOL 2.5 MG: 2.5 TABLET ORAL at 22:19

## 2022-11-09 RX ADMIN — SUGAMMADEX 200 MG: 100 INJECTION, SOLUTION INTRAVENOUS at 10:01

## 2022-11-09 RX ADMIN — OXYCODONE HYDROCHLORIDE 5 MG: 5 TABLET ORAL at 11:18

## 2022-11-09 RX ADMIN — SODIUM CHLORIDE, SODIUM LACTATE, POTASSIUM CHLORIDE, AND CALCIUM CHLORIDE 125 ML/HR: .6; .31; .03; .02 INJECTION, SOLUTION INTRAVENOUS at 06:55

## 2022-11-09 RX ADMIN — DOCUSATE SODIUM 100 MG: 100 CAPSULE, LIQUID FILLED ORAL at 17:35

## 2022-11-09 RX ADMIN — INSULIN LISPRO 10 UNITS: 100 INJECTION, SOLUTION INTRAVENOUS; SUBCUTANEOUS at 11:36

## 2022-11-09 RX ADMIN — Medication 2000 UNITS: at 17:34

## 2022-11-09 RX ADMIN — GABAPENTIN 300 MG: 300 CAPSULE ORAL at 06:55

## 2022-11-09 RX ADMIN — LIDOCAINE HYDROCHLORIDE 50 MG: 10 INJECTION, SOLUTION EPIDURAL; INFILTRATION; INTRACAUDAL; PERINEURAL at 07:40

## 2022-11-09 RX ADMIN — ACETAMINOPHEN 975 MG: 325 TABLET, FILM COATED ORAL at 17:33

## 2022-11-09 RX ADMIN — TRANEXAMIC ACID 1000 MG: 100 INJECTION, SOLUTION INTRAVENOUS at 06:57

## 2022-11-09 RX ADMIN — INSULIN GLARGINE 30 UNITS: 100 INJECTION, SOLUTION SUBCUTANEOUS at 22:17

## 2022-11-09 RX ADMIN — SODIUM CHLORIDE, SODIUM LACTATE, POTASSIUM CHLORIDE, AND CALCIUM CHLORIDE: .6; .31; .03; .02 INJECTION, SOLUTION INTRAVENOUS at 09:15

## 2022-11-09 RX ADMIN — OXYCODONE HYDROCHLORIDE 10 MG: 5 TABLET ORAL at 22:16

## 2022-11-09 RX ADMIN — CEFAZOLIN SODIUM 1000 MG: 1 SOLUTION INTRAVENOUS at 17:38

## 2022-11-09 RX ADMIN — SODIUM CHLORIDE, SODIUM LACTATE, POTASSIUM CHLORIDE, AND CALCIUM CHLORIDE 100 ML/HR: .6; .31; .03; .02 INJECTION, SOLUTION INTRAVENOUS at 15:38

## 2022-11-09 RX ADMIN — B-COMPLEX W/ C & FOLIC ACID TAB 1 TABLET: TAB at 17:53

## 2022-11-09 RX ADMIN — HYDROMORPHONE HYDROCHLORIDE 0.5 MG: 1 INJECTION, SOLUTION INTRAMUSCULAR; INTRAVENOUS; SUBCUTANEOUS at 09:16

## 2022-11-09 RX ADMIN — ROCURONIUM BROMIDE 50 MG: 10 INJECTION INTRAVENOUS at 07:40

## 2022-11-09 RX ADMIN — AMITRIPTYLINE HYDROCHLORIDE 50 MG: 50 TABLET, FILM COATED ORAL at 22:19

## 2022-11-09 RX ADMIN — OXYCODONE HYDROCHLORIDE 10 MG: 5 TABLET ORAL at 15:35

## 2022-11-09 RX ADMIN — CEFAZOLIN SODIUM 2000 MG: 2 SOLUTION INTRAVENOUS at 07:45

## 2022-11-09 RX ADMIN — SENNOSIDES 8.6 MG: 8.6 TABLET, FILM COATED ORAL at 17:34

## 2022-11-09 RX ADMIN — ASPIRIN 81 MG: 81 TABLET, COATED ORAL at 17:35

## 2022-11-09 RX ADMIN — ALBUMIN (HUMAN): 12.5 INJECTION, SOLUTION INTRAVENOUS at 08:53

## 2022-11-09 RX ADMIN — INSULIN LISPRO 14 UNITS: 100 INJECTION, SOLUTION INTRAVENOUS; SUBCUTANEOUS at 18:05

## 2022-11-09 RX ADMIN — FENTANYL CITRATE 50 MCG: 50 INJECTION, SOLUTION INTRAMUSCULAR; INTRAVENOUS at 07:40

## 2022-11-09 RX ADMIN — MIDAZOLAM 2 MG: 1 INJECTION INTRAMUSCULAR; INTRAVENOUS at 07:32

## 2022-11-09 RX ADMIN — ROCURONIUM BROMIDE 10 MG: 10 INJECTION INTRAVENOUS at 09:44

## 2022-11-09 RX ADMIN — GABAPENTIN 300 MG: 300 CAPSULE ORAL at 22:19

## 2022-11-09 RX ADMIN — ATORVASTATIN CALCIUM 40 MG: 40 TABLET, FILM COATED ORAL at 17:34

## 2022-11-09 RX ADMIN — INSULIN LISPRO 2 UNITS: 100 INJECTION, SOLUTION INTRAVENOUS; SUBCUTANEOUS at 22:18

## 2022-11-09 RX ADMIN — FENTANYL CITRATE 50 MCG: 50 INJECTION, SOLUTION INTRAMUSCULAR; INTRAVENOUS at 08:23

## 2022-11-09 RX ADMIN — DEXAMETHASONE SODIUM PHOSPHATE 10 MG: 10 INJECTION, SOLUTION INTRAMUSCULAR; INTRAVENOUS at 07:40

## 2022-11-09 RX ADMIN — OXYCODONE HYDROCHLORIDE AND ACETAMINOPHEN 500 MG: 500 TABLET ORAL at 17:35

## 2022-11-09 RX ADMIN — ONDANSETRON 4 MG: 2 INJECTION INTRAMUSCULAR; INTRAVENOUS at 07:00

## 2022-11-09 RX ADMIN — ACETAMINOPHEN 975 MG: 325 TABLET, FILM COATED ORAL at 06:56

## 2022-11-09 RX ADMIN — ALLOPURINOL 300 MG: 300 TABLET ORAL at 17:34

## 2022-11-09 RX ADMIN — FOLIC ACID 1 MG: 1 TABLET ORAL at 17:34

## 2022-11-09 RX ADMIN — EZETIMIBE 10 MG: 10 TABLET ORAL at 17:35

## 2022-11-09 RX ADMIN — NIFEDIPINE 60 MG: 30 TABLET, FILM COATED, EXTENDED RELEASE ORAL at 17:35

## 2022-11-09 RX ADMIN — ONDANSETRON 4 MG: 2 INJECTION INTRAMUSCULAR; INTRAVENOUS at 10:03

## 2022-11-09 RX ADMIN — POTASSIUM CHLORIDE 20 MEQ: 1500 TABLET, EXTENDED RELEASE ORAL at 17:34

## 2022-11-09 RX ADMIN — CHLORHEXIDINE GLUCONATE 15 ML: 1.2 SOLUTION ORAL at 06:55

## 2022-11-09 RX ADMIN — INSULIN LISPRO 4 UNITS: 100 INJECTION, SOLUTION INTRAVENOUS; SUBCUTANEOUS at 18:05

## 2022-11-09 NOTE — DISCHARGE INSTRUCTIONS
Dr Alberto Araya Hip Replacement    What to Expect/Activity  You are weight bearing as tolerated to your operative leg unless otherwise instructed  Use your walker or crutches  It is important to get up and walk for 10 minutes every hour, if possible  Initial recovery therapy is focused on walking  If in your follow-up a referral to formal physical therapy is required, this will be provided based on your recovery  You may sleep however you would like  Many patients feel more comfortable with a pillow between their legs and find it uncomfortably to lay on the operative side  If you do roll on that side at night you will not damage the replacement  You may use a regular or elevated toilet seat, whichever is more comfortable  We do not routinely require any specific precautions in terms of positioning of your leg and if so this will be taught to you by the physical therapists at the hospital  This means that you can dress as you are comfortable, including shoes and socks  You can bend down carefully to get items from the floor  Swelling and discomfort causes pain  Elevate your surgical leg on 1-2 pillows placed behind your ankle/calf throughout the day, especially when you are laying down  Please use ice packs for 20-30 minutes every 1-2 hours for 48-72 hours on the operative hip  Please make sure there is a barrier directly between your skin and your ice/ice pack  Please use incentive spirometer 10 times per hour while awake (see diagram below)  Dressing/Wound Care/Bathing  There is a surgical dressing over your incision that stays in place for 7 days after surgery  You may start showering 24 hours after surgery, the surgical dressing will remain in place  Please pat the dressing dry  If you notice the dressing appears saturated or is starting to come off, please replace with a dry dressing  On the 7th day, remove dressing carefully  Your sutures will be under the skin   If you notice light drainage after removing the dressing please cover with a dry dressing  If this drainage continues please contact the office  There may be dried blood around the incision  It is ok to continue showering after removing the dressing but do not scrub the incision  Pat incision dry  Do not place any creams, ointments or gels on or around the incision  No baths, swimming or submerging until cleared by Dr Calli Crowder  Pain Management/Medications  You may resume your usual medications  Please take the following medications:  Anti-coagulation (blood clot prevention) - aspirin 81mg for 4 weeks  Pain medication:  Narcotic Medication: Take as directed  NSAID (Anti-inflammatory): Take as directed  Tylenol 1000mg every 8 hours  Zofran (ondasetron) - 4mg every 8 hours as needed for nausea  Stool Softeners (senna/colace)- take daily to help prevent constipation as narcotic pain medication causes constipation  Antibiotic - take as directed if prescribed  If you have questions or pain concerns, please contact the office  Pain medication cannot remove all post-operative pain  Follow up/Call if:  The findings of your surgery will be explained to you and your family immediately after surgery  However, in the post-operative period, during recovery from anesthesia you may not fully remember or fully understand what was said  We will go over this again when you return for your post-op appointment    Please contact Dr Goodwin Speaker office if you experience the following:  Excessive bleeding (bleeding through your dressing)  Fever greater than 101 degrees F after the first 2 days (a slight fever is normal the first few days after surgery)  Persistent nausea or vomiting  Decreased sensation or discoloration of the operative limb  Pain or swelling that is getting worse and not better with medication    Dr Pablo Sow: [phone]

## 2022-11-09 NOTE — PLAN OF CARE
Problem: OCCUPATIONAL THERAPY ADULT  Goal: Performs self-care activities at highest level of function for planned discharge setting  See evaluation for individualized goals  Description: Treatment Interventions: ADL retraining, Functional transfer training, Endurance training, Patient/family training, Equipment evaluation/education, Compensatory technique education, Energy conservation, Continued evaluation          See flowsheet documentation for full assessment, interventions and recommendations  Note: Limitation: Decreased ADL status, Decreased self-care trans, Decreased high-level ADLs  Prognosis: Good  Assessment: Pt is a 70 y o  male seen for OT evaluation at [facility], admitted 11/9/2022 w/ Primary osteoarthritis of one hip, right  Pt is POD#0 R THR with anterior approach, no hip precautions except to avoid extreme motions & WBAT  OT completed extensive review of pt's medical and social history  Comorbidities affecting pt's functional performance at time of assessment include: DM II, HTN, diabetic polyneuropathy, CAD, hx of CKD, incomplete tear of R RTC, OA of hand, OA of knee, gouty arthritis, thoracic aortic aneurysm  Personal factors affecting pt at time of IE include: steps to enter environment, difficulty performing ADLS, difficulty performing IADLS  and environment  Prior to admission, pt was living with his wife in a [facility] with 3STE  Pt was I w/  ADLS and w/ IADLS, (+) drove, & required use of no DME/AD PTA  Upon evaluation: Pt requires Min Ax1 for bed mobility, Min Ax1 for functional mobility/transfers, independent for UB ADLs and Min-Mod A for LB ADLS 2* the following deficits impacting occupational performance: weakness, decreased balance, decreased tolerance, increased pain and orthopedic restrictions  Full objective findings from OT assessment regarding body systems outlined above   Pt to benefit from continued skilled OT tx while in the hospital to address deficits as defined above and maximize level of functional independence w/ ADL's and functional mobility  Occupational Performance areas to address include: bathing/shower, toilet hygiene, dressing, health maintenance, functional mobility, community mobility and clothing management  Based on findings, pt is of high complexity  The patient's raw score on the AM-PAC Daily Activity inpatient short form is 20, standardized score is 42 03, greater than 39 4  Patients at this level are likely to benefit from DC to home, which does coincide with current above OT recommendations  However, please refer to therapist recommendation for discharge planning given other factors that may influence destination  At this time, OT recommendations at time of discharge are anticipate home with home OT pending medical optimization & pain control       OT Discharge Recommendation: Home with home health rehabilitation (Anticipate pt will have improved performance once medically optimized & pain controlled)

## 2022-11-09 NOTE — PLAN OF CARE
Problem: PHYSICAL THERAPY ADULT  Goal: Performs mobility at highest level of function for planned discharge setting  See evaluation for individualized goals  Description: Treatment/Interventions: Functional transfer training, LE strengthening/ROM, Elevations, Therapeutic exercise, Endurance training, Patient/family training, Equipment eval/education, Bed mobility, Gait training, Spoke to nursing, OT          See flowsheet documentation for full assessment, interventions and recommendations  Note: Prognosis: Good  Problem List: Decreased strength, Decreased endurance, Impaired balance, Decreased mobility, Pain  Assessment: Patient is a 70y/o M who is POD 0 R anterior BRANDEE  Patient resides with spouse in a [facility] with steps to enter  Plans to stay on the first floor  Is independent at baseline  Current medical status includes pain, fall risk, bed alarm, orthostatic hypotension, decreased strength, ROM, balance, endurance and mobility  Patient performed bed mobility and transfers with min A  Patient reporting high pain levels  Drop in BP in stance  Patient feeling unwell  Also had a posterior LOB in stance  Patient was not appropriate for further mobility due to medical status  Unable to determine discharge recommendation at this time  Patient was educated on importance of OOB activity  Demonstration provided for proper use of RW  Will continue to evaluate and determine discharge recommendation next session  The patient's AM-PAC Basic Mobility Inpatient Short Form Raw Score is 12  A Raw score of less than or equal to 16 suggests the patient may benefit from discharge to post-acute rehabilitation services  Please also refer to the recommendation of the Physical Therapist for safe discharge planning  Barriers to Discharge: Inaccessible home environment, Decreased caregiver support     PT Discharge Recommendation:  (to be determined)    See flowsheet documentation for full assessment

## 2022-11-09 NOTE — OCCUPATIONAL THERAPY NOTE
Occupational Therapy Evaluation ([id number]) & Treat ([id number])     Patient Name: Anson Morgan  VYWUR'C Date: 11/9/2022  Problem List  Principal Problem:    Primary osteoarthritis of one hip, right    Past Medical History  Past Medical History:   Diagnosis Date    Arthritis     Cervical disc disease     C2-7, needs repair    Coronary artery disease     angina is pain in throat    Susanna Hernandez infection     in 45s with liver affects    Hypertension     TIA (transient ischemic attack)     X 2     Past Surgical History  Past Surgical History:   Procedure Laterality Date    ANKLE ARTHROSCOPY Bilateral     APPENDECTOMY      CATARACT EXTRACTION, BILATERAL Bilateral     CHOLECYSTECTOMY      CORONARY ANGIOPLASTY WITH STENT PLACEMENT      2 stents in LAD    ELBOW ARTHROSCOPY Bilateral     with right elbow tear repaired    ERCP      KNEE ARTHROSCOPY Right     LUMBAR LAMINECTOMY      5 times    MULTIPLE TOOTH EXTRACTIONS      NOSE SURGERY  2021    fracture post a fall    REPLACEMENT TOTAL KNEE Left     SINUS SURGERY      THUMB FUSION Bilateral     thumb repair with bone grafts    TONSILLECTOMY AND ADENOIDECTOMY               11/09/22 1248   OT Last Visit   OT Visit Date 11/09/22   Note Type   Note type Evaluation   Pain Assessment   Pain Assessment Tool 0-10   Pain Score 9   Pain Location/Orientation Orientation: Right;Location: Hip   Effect of Pain on Daily Activities Mobility, fuctional txfers, ADLs   Patient's Stated Pain Goal No pain   Hospital Pain Intervention(s) Ambulation/increased activity;Cold applied   Restrictions/Precautions   Weight Bearing Precautions Per Order Yes   RLE Weight Bearing Per Order WBAT   Other Precautions THR;Pain; Fall Risk  (Anterior approach - no hip precautions except to avoid extreme motions)   Home Living   Type of Home House  (3 [address])   Home Layout Two level; Able to live on main level with bedroom/bathroom;1/2 bath on main level;Stairs to enter with rails  (Plans on staying on FF & sleeping in recliner)   Bathroom Shower/Tub Tub/shower unit   Bathroom Toilet Standard   Bathroom Equipment Grab bars in shower;Built-in shower seat   Home Equipment Walker;Cane   Additional Comments Was not using at baseline   Prior Function   Level of [place] Independent with ADLs; Independent with IADLS; Independent with functional mobility   Lives With Spouse   Receives Help From Family   IADLs Independent with driving; Independent with meal prep; Independent with medication management   Falls in the last 6 months 0   Vocational Full time employment   Lifestyle   Autonomy Independent in ADLs/IADLs - increased pain durng dressing   Reciprocal Relationships Wife   Service to Others [facility]  &  at [address] [facility]   Additional Pertinent History R knee OA which additionally causes pt pain   Family/Caregiver Present Yes   Subjective   Subjective "I can't go home like this"   ADL   Eating Assistance 7  Independent   Grooming Assistance 7  Independent   UB Bathing Assistance 5  Supervision/Setup   LB Bathing Assistance 4  [address] 5  Supervision/Setup    [facility] 3  Moderate [address]  4  Minimal Assistance   Additional Comments Pt & wife state wife will assist with LB dressing if needed  Declined education on LHAD   Bed Mobility   Supine to Sit 4  Minimal assistance   Additional items Assist x 1; Increased time required; Bedrails;Verbal cues   Sit to Supine 4  Minimal assistance   Additional items Assist x 1; Increased time required;Verbal cues;LE management; Bedrails   Transfers   Sit to Stand 4  Minimal assistance   Additional items Assist x 1; Increased time required;Verbal cues  (Verbal cues & demonstration on hand placement)   Stand to Sit 5  Supervision   Additional items Verbal cues; Increased time required   Additional Comments See tx session for additional txfers & BP readings   Functional Mobility   Additional Comments TERRY - pt orthostatic & signifcant pain   Balance   Static Sitting Good   Dynamic Sitting Fair +   Static Standing Fair   Dynamic Standing Fair -   Activity Tolerance   Activity Tolerance Patient limited by pain;Treatment limited secondary to medical complications (Comment)  (BP - see additional tx session below for BP readings)   Medical Staff Made Aware Co-Treat with PT Betzy   Nurse Made Aware YUSUF Tsang aware, TT to DO [facility] & SPENSER Alexander   RUE Assessment   RUE Assessment WFL   LUE Assessment   LUE Assessment WFL   Cognition   Overall Cognitive Status WFL   Arousal/Participation Alert   Attention Within functional limits   Orientation Level Oriented X4   Memory Within functional limits   Following Commands Follows all commands and directions without difficulty   Assessment   Limitation Decreased ADL status; Decreased self-care trans;Decreased high-level ADLs   Prognosis Good   Assessment Pt is a 70 y o  male seen for OT evaluation at [facility], admitted 11/9/2022 w/ Primary osteoarthritis of one hip, right  Pt is POD#0 R THR with anterior approach, no hip precautions except to avoid extreme motions & WBAT  OT completed extensive review of pt's medical and social history  Comorbidities affecting pt's functional performance at time of assessment include: DM II, HTN, diabetic polyneuropathy, CAD, hx of CKD, incomplete tear of R RTC, OA of hand, OA of knee, gouty arthritis, thoracic aortic aneurysm  Personal factors affecting pt at time of IE include: steps to enter environment, difficulty performing ADLS, difficulty performing IADLS  and environment  Prior to admission, pt was living with his wife in a [facility] with 3STE  Pt was I w/  ADLS and w/ IADLS, (+) drove, & required use of no DME/AD PTA   Upon evaluation: Pt requires Min Ax1 for bed mobility, Min Ax1 for functional mobility/transfers, independent for UB ADLs and Min-Mod A for LB ADLS 2* the following deficits impacting occupational performance: weakness, decreased balance, decreased tolerance, increased pain and orthopedic restrictions  Full objective findings from OT assessment regarding body systems outlined above  Pt to benefit from continued skilled OT tx while in the hospital to address deficits as defined above and maximize level of functional independence w/ ADL's and functional mobility  Occupational Performance areas to address include: bathing/shower, toilet hygiene, dressing, health maintenance, functional mobility, community mobility and clothing management  Based on findings, pt is of high complexity  The patient's raw score on the AM-PAC Daily Activity inpatient short form is 20, standardized score is 42 03, greater than 39 4  Patients at this level are likely to benefit from DC to home, which does coincide with current above OT recommendations  However, please refer to therapist recommendation for discharge planning given other factors that may influence destination  At this time, OT recommendations at time of discharge are anticipate home with home OT pending medical optimization & pain control  Goals   Patient Goals Pt wants to stay in hospital overnight   Plan   Treatment Interventions ADL retraining;Functional transfer training; Endurance training;Patient/family training;Equipment evaluation/education; Compensatory technique education; Energy conservation;Continued evaluation   Goal Expiration Date 11/14/22   OT Treatment Day 0   OT Frequency 2-3x/wk   Recommendation   OT Discharge Recommendation Home with home health rehabilitation  (Anticipate pt will have improved performance once medically optimized & pain controlled)   Additional Comments  Pt declining edu on LHAD, states wife will help   AM-PAC Daily Activity Inpatient   Lower Body Dressing 2   Bathing 3   Toileting 3   Upper Body Dressing 4   Grooming 4   Eating 4   Daily Activity Raw Score 20   Daily Activity Standardized Score (Calc for Raw Score >=11) 42 03   AM-PAC Applied Cognition Inpatient   Following a Speech/Presentation 4   Understanding Ordinary Conversation 4   Taking Medications 4   Remembering Where Things Are Placed or Put Away 4   Remembering List of 4-5 Errands 4   Taking Care of Complicated Tasks 4   Applied Cognition Raw Score 24   Applied Cognition Standardized Score 62 21   Additional Treatment Session   Start Time 1305   End Time 1320   Treatment Assessment Pt performed multiple sit <> stand txfers with Min Ax1  Pt with (+) LOB posteriorly requiring Mod Ax1 to correct  Pt able to march in place with RW & Min Ax1  Pt required Min Ax1 for sit > supine  Pt left supine in bed with needs in reach, RN aware  BP overall: supine 140/70, sitting /75, sitting s/p 2 min 137/69, standing 119/62, sitting 112/60, sitting s/p 2 min 118/64, standing 112/57, standing s/p standing marches 103/60, flat supine 152/69  End of Consult   Education Provided Yes;Family or social support of family present for education by provider   Patient Position at End of Consult Supine; All needs within reach   Nurse Communication Nurse aware of consult     Pt will achieve the following goals within 10 days  *Pt will complete LB bathing and dressing with Mod I & DME PRN  *Pt will complete toileting w/ Mod I w/ G hygiene/thoroughness using DME PRN    *Pt will complete bed mobility with Mod I, with HOB elevated & use of side rails PRN to prep for purposeful tasks    *Pt will perform functional transfers with on/off all surfaces with Mod I using DME as needed w/ G balance/safety  *Pt will increase standing tolerance to 5+ minutes in order to complete sinkside ADL  *Pt will independently identify 3-5 fall risks during ADL routine to ensure home safety upon discharge  *Pt will improve functional mobility during ADL/IADL/leisure tasks to Mod I using DME as needed w/ G balance/safety        *Pt will demonstrate 100% carryover of precautions s/p review w/o cues w/ Mod I w/ G tolerance/participation t/o functional ADL/IADL/leisure tasks      *Assess DME needs     Drake Pittman, OTR/L

## 2022-11-09 NOTE — OP NOTE
OPERATIVE REPORT  PATIENT NAME: Noemí Villalpando    :  1950  MRN: [id number]  Pt Location: [facility] OR ROOM 02    SURGERY DATE: 2022    Surgeon(s) and Role:     * Taylor Martinez,  - Primary     * Shane Martinez PA-C - Assisting    Preop Diagnosis:  Primary osteoarthritis of one hip, right [M16 11]    Post-Op Diagnosis Codes:     * Primary osteoarthritis of one hip, right [M16 11]    Procedure(s) (LRB):  ARTHROPLASTY HIP TOTAL ANTERIOR,NAVIGATED- SAME DAY (Right)    Specimen(s):  * No specimens in log *    Estimated Blood Loss:   200 mL    Drains:  * No LDAs found *    Anesthesia Type:   Spinal    Implant Name Type Inv  Item Serial No   Lot No  LRB No  Used Action   SHELL ACETABULAR 54MM POROUS SOLID LCK RING PINNACLE - [id number]  SHELL ACETABULAR 54MM POROUS SOLID LCK RING PINNACLE  DEPUY [id number] Right 1 Implanted   LINER ACTB ULT LNK PE 36 X 54MM 0DEG NEUTRAL ALTRX - [id number]  LINER ACTB ULT LNK PE 36 X 54MM 0DEG NEUTRAL ALTRX  DEPUY [id number] Right 1 Implanted   STEM FEM SZ 6 TAPER CMNTLS HI COLLAR ACTIS - [id number]  STEM FEM SZ 6 TAPER CMNTLS HI COLLAR ACTIS  DEPUY [id number] Right 1 Implanted   HEAD FEMORAL 12/ TPR 36MM +1 5MM ARTICULEZE BIOLOX DELTA - [id number]  HEAD FEMORAL /14 TPR 36MM +1 5MM ARTICULEZE BIOLOX DELTA  DEPUY [id number] Right 1 Wasted   HEAD FEMORAL CMNTLS  TPR 36MM +5MM BIOLOX DELTA ARTICULEZE - [id number]  HEAD FEMORAL CMNTLS 12/14 TPR 36MM +5MM BIOLOX DELTA ARTICULEZE  DEPUY [id number] Right 1 Implanted         Operative Indications:  Primary osteoarthritis of one hip, right [M16 11]  71 y/o male with right hip osteoarthritis and right knee osteoarthritis  He has known OA of the right hip  He has limited and painful range of motion of the right hip on exam   He did well with pain relief initially after his marcaine challenge injection, but this returned when the injection wore off  He also has knee OA which has had previous aspirations   The patient has failed PT, NSAIDs and activity modifications  The patient has elected to proceed with right BRANDEE through the anterior approach  Risks and benefits of surgery to include but not limited to bleeding, infection, damage to surrounding structures, hardware failure, instability, fracture, dislocation, leg length inequality, need for further surgery, continued pain, stiffness, blood clots, stroke, heart attack, and LFCN numbness was discussed with the patient  Patient is a nonsmoker and appropriate BMI  Operative Findings:  Severe bony erosions of femoral head and acetabulum with complete cartilage loss posteriorly   Peripheral osteophytes    Complications:   None    Procedure and Technique:  The patient seen in the preoperative area  The informed consent was confirmed  The operative site was confirmed and marked  Patient was taken to the operating room and general anesthesia was performed by the anesthesiologist   The patient's bilateral lower extremities were well padded and placed in the in the hana table boots  Patient was secured to the operating room table and all bony prominences were well padded  Patient was given perioperative antibiotics per scip protocol  A formal time-out was performed identifying the patient and correct surgical site  The right lower extremity was prepped and draped in usual sterile fashion  A 10 cm incision was made anteriorly approximately 2 fingerbreadths lateral and 3 fingerbreadths distal to ASIS avoiding the hip flexion crease sharply with a 10 blade  Electrocautery was used to cauterize skin bleeders  This was carried down through subcutaneous tissues to the level of the tensor fascia yuli fascia  Fascia was incised in line with the muscle fibers  The tensor fascia yuli muscle was then moved laterally and the inferior fascia was incised  The branches of the lateral femoral cutaneous vessles were coagulated with electrocautery    Retractors were placed carefully around the femoral neck   Pericapsular fat was removed  The anterior capsule was removed in its entirety  Retractors were then carefully placed around the femoral neck  Femoral neck was resected in line with our template  A napkin ring was performed to better facilitate removal of the head  Femoral head was removed with corkscrew  Femoral head measured approximately 52 mm but was significantly deformed  At this time retractors were carefully placed around the acetabulum  The labrum was removed sharply  Her inferior capsule was released  Pulvinar was was removed to expose the medial wall of the acetabulum  We started sequentially reaming at a size 45 medialized to the medial wall  We then sequentially reamed up to a size 53 with excellent chatter the Reamer  We then impacted the Fifield sector cluster hole 54 mm cup into place with the guidance of the [employer] hip navigation  Our cup was placed at approximately 40° of abduction and 20 anteversion per navigation guidance  Cup was well fixed and we placed the neutral Fifield liner  The liner was inspected and assured to be well seated  At this time all the retractors were removed and attention was drawn to the femur  Retractors were placed over the greater trochanter and along the medial calcar exposing the top of the femur  Lateral capsule was resected allowing elevation of the femur of the wound  We used a rasp as a canal finer  We then started broaching sequentially up to a size 6 Actis  The size 6 had excellent rotational stability and canal fill  We calcar planed down to the level of stem  We trialed the hip with both std offset and high offset with a HO and +5 trial head  The hip had excellent stability to 30° of internal rotation and 90° of external rotation with restoration of leg length and offset with the high offset and +5 head  This was confirmed with intraoperative navigation via the [employer] hip navigation platform    The trial femoral stem was removed and the real size 6 high offset stem was impacted into place  The +5 mm ceramic head was impacted onto a dry trunnion  At this time the hip was lavaged with Aricept solution  Hip was then irrigated with remainder of 3 L of normal saline solution  The deep tissues were again inspected for any bleeding from the lateral femoral cutaneous vessels or muscle bleeders  Any bleeders were cauterized with electrocautery  The fascia was then closed with interrupted 0 Vicryl followed by running #1 Stratafix suture  The subcutaneous tissue was closed with interrupted 2-0 Vicryl  The skin was then closed with running 4-0 Stratafix  A Prineo dressing was placed sterilely  The patient was awoken from anesthesia and taken to recovery room in stable condition  I was present for the entire case incision to closure  Anne Burleson PA-C was necessary for positioning, retracting, and suturing  No qualified resident was available for the case       Patient Disposition:  PACU       71M s/p Right BRANDEE for end-stage hip osteoarthritis  - pain control   - abx ancef pre-op, duricef post op as planned same day discharge   - DVT ppx: 81mg BID  - PT/OT  - WBAT  - No formal hip precautions, no extremes of motion  - keep dressing in place until follow-up  - f/u 2 weeks       I was present for the entire procedure, A qualified resident physician was not available and A physician assistant was required during the procedure for retraction tissue handling,dissection and suturing    Patient Disposition:  PACU         SIGNATURE: Taylor Martinez DO  DATE: November 9, 2022  TIME: 10:37 AM

## 2022-11-09 NOTE — ASSESSMENT & PLAN NOTE
Patient presents for elective right THR    Admitted to Orthopedic surgery  · Will defer pharmacological DVT prophylaxis to primary team  · Pain control  · Weight-bearing as tolerated  · PT/OT evaluations

## 2022-11-09 NOTE — PHYSICAL THERAPY NOTE
PHYSICAL THERAPY EVAL/TX  Physical Therapy Evaluation    Performed at least 2 patient identifiers during session:  Patient Active Problem List   Diagnosis    Type 2 diabetes mellitus with hyperglycemia, with long-term current use of insulin ([facility] )    Hypercholesterolemia with hypertriglyceridemia    Primary hypertension    Diabetic polyneuropathy associated with type 2 diabetes mellitus ([facility] )    Stage 3 chronic kidney disease, unspecified whether stage 3a or 3b CKD (HCC)    Primary osteoarthritis of one hip, right    Right hip pain    CAD (coronary artery disease)    Hx of chronic kidney disease    Incomplete tear of right rotator cuff    Osteoarthrosis, hand    Primary osteoarthritis of knee    Thoracic aortic aneurysm without rupture    Gouty arthritis       Past Medical History:   Diagnosis Date    Arthritis     Cervical disc disease     C2-7, needs repair    Coronary artery disease     angina is pain in throat    Susanna Hernandez infection     in 45s with liver affects    Hypertension     TIA (transient ischemic attack)     X 2       Past Surgical History:   Procedure Laterality Date    ANKLE ARTHROSCOPY Bilateral     APPENDECTOMY      CATARACT EXTRACTION, BILATERAL Bilateral     CHOLECYSTECTOMY      CORONARY ANGIOPLASTY WITH STENT PLACEMENT      2 stents in LAD    ELBOW ARTHROSCOPY Bilateral     with right elbow tear repaired    ERCP      KNEE ARTHROSCOPY Right     LUMBAR LAMINECTOMY      5 times    MULTIPLE TOOTH EXTRACTIONS      NOSE SURGERY  2021    fracture post a fall    REPLACEMENT TOTAL KNEE Left     SINUS SURGERY      THUMB FUSION Bilateral     thumb repair with bone grafts    TONSILLECTOMY AND ADENOIDECTOMY            11/09/22 1320   PT Last Visit   PT Visit Date 11/09/22   Note Type   Note type Evaluation   Pain Assessment   Pain Assessment Tool 0-10   Pain Score 9   Pain Location/Orientation Orientation: Right;Location: [facility] Pain Intervention(s) Medication (See MAR); Ambulation/increased activity   Restrictions/Precautions   Weight Bearing Precautions Per Order Yes   RLE Weight Bearing Per Order WBAT   Other Precautions Fall Risk;Pain;THR;WBS; Bed Alarm   Home Living   Type of Home House  (3STE)   Home Layout Two level; Able to live on main level with bedroom/bathroom;1/2 bath on main level   Bathroom Shower/Tub Tub/shower unit   Bathroom Toilet Standard   Bathroom Equipment Grab bars in [facility] Walker;Cane   Additional Comments Was not using an AD prior to admission  Plans to stay on first floor  Prior Function   Level of [place] Independent with ADLs; Independent with functional mobility; Independent with IADLS   Lives With Spouse; Family   Receives Help From Family   IADLs Independent with driving; Independent with meal prep; Independent with medication management   Falls in the last 6 months 0   Vocational Full time employment   General   Family/Caregiver Present Yes   Cognition   Overall Cognitive Status WFL   Arousal/Participation Alert   Attention Within functional limits   Orientation Level Oriented X4   Memory Within functional limits   Following Commands Follows all commands and directions without difficulty   Subjective   Subjective "My pain is not controlled "   RLE Assessment   RLE Assessment   (Unable to assess due to pain)   LLE Assessment   LLE Assessment WFL   Light Touch   RLE Light Touch Grossly intact   LLE Light Touch Grossly intact   Bed Mobility   Supine to Sit 4  Minimal assistance   Additional items Assist x 1;HOB elevated; Increased time required;Verbal cues   Sit to Supine 4  Minimal assistance   Additional items Assist x 1;HOB elevated; Bedrails; Increased time required;Verbal cues   Additional Comments BP supine: 146/70 sitting 140/75 sitting after 2-3 minutes 137/69 standing 119/62 sitting 112/60 standing 112/57 standing after marching for 1-2 minutes 103/60  supine 152/69   Transfers Sit to Stand 4  Minimal assistance   Additional items Assist x 1; Armrests; Increased time required;Verbal cues  (Demonstration provided for hand placement)   Stand to Sit 5  Supervision   Additional items Armrests; Verbal cues   Additional Comments See treatment session for additional mobility   Balance   Static Sitting Good   Dynamic Sitting Fair +   Static Standing Fair   Dynamic Standing Fair -   Activity Tolerance   Activity Tolerance Patient limited by pain;Treatment limited secondary to medical complications (Comment)   Medical Staff Made Aware Co-treat with Renu LEE   Nurse Made Aware YUSUF Mccarthy aware of pain and BP  Tiget text sent to SPENSER [place] regarding patients medical status   Assessment   Prognosis Good   Problem List Decreased strength;Decreased endurance; Impaired balance;Decreased mobility;Pain   Assessment Patient is a 70y/o M who is POD 0 R anterior BRANDEE  Patient resides with spouse in a [facility] with steps to enter  Plans to stay on the first floor  Is independent at baseline  Current medical status includes pain, fall risk, bed alarm, orthostatic hypotension, decreased strength, ROM, balance, endurance and mobility  Patient performed bed mobility and transfers with min A  Patient reporting high pain levels  Drop in BP in stance  Patient feeling unwell  Also had a posterior LOB in stance  Patient was not appropriate for further mobility due to medical status  Unable to determine discharge recommendation at this time  Patient was educated on importance of OOB activity  Demonstration provided for proper use of RW  Will continue to evaluate and determine discharge recommendation next session  The patient's AM-PAC Basic Mobility Inpatient Short Form Raw Score is 12  A Raw score of less than or equal to 16 suggests the patient may benefit from discharge to post-acute rehabilitation services  Please also refer to the recommendation of the Physical Therapist for safe discharge planning     Barriers to Discharge Inaccessible home environment;Decreased caregiver support   Goals   Patient Goals To have better pain control  STG Expiration Date 11/12/22   Short Term Goal #1 1  Perform supine<>sit with HOB elevated without bedrails ind 2  Perform sit<>stand transfers mod i 3  Ambulate 200ft with a RW mod I 4  Ascend/descend 3 stairs with railing nonreciprocal pattern mod I   PT Treatment Day 1   Plan   Treatment/Interventions Functional transfer training;LE strengthening/ROM; Elevations; Therapeutic exercise; Endurance training;Patient/family training;Equipment eval/education; Bed mobility;Gait training;Spoke to nursing;OT   PT Frequency Twice a day   Recommendation   PT Discharge Recommendation   (to be determined)   Additional Comments Owns a RW   AM-PAC Basic Mobility Inpatient   Turning in Bed Without Bedrails 3   Lying on Back to Sitting on Edge of Flat Bed 3   Moving Bed to Chair 1   Standing Up From Chair 3   Walk in Room 1   Climb 3-5 Stairs 1   Basic Mobility Inpatient Raw Score 12   Basic Mobility Standardized Score 32 23   Highest Level Of Mobility   -[facility] Goal 4: Move to chair/commode   JH-HL Achieved 5: Stand (1 or more minutes)   Additional Treatment Session   Start Time 1300   End Time 1320   Treatment Assessment Patient took a seated rest break  Performed additional sit<>stand transfer with min A x 1  Patient had a posterior LOB requiring mod A to correct  He also needed continued verbal cues for hand placement for transfer  Patient stood for approx 2-3 minutes  Marching in place 10x  BP continued to drop  Returned to sitting position  Sit to supine with min A x 1   Equipment Use RW   End of Consult   Patient Position at End of Consult Supine;Bed/Chair alarm activated; All needs within reach     Belen Lange, PT             Patient Name: Karen MARSHALL Date: 11/9/2022

## 2022-11-09 NOTE — ANESTHESIA POSTPROCEDURE EVALUATION
Post-Op Assessment Note    CV Status:  Stable  Pain Score: 0    Pain management: adequate     Mental Status:  Sleepy   Hydration Status:  Stable   PONV Controlled:  None   Airway Patency:  Patent      Post Op Vitals Reviewed: Yes      Staff: CRNA         No complications documented      BP   112/56   Temp      Pulse/  73   Resp   18   SpO2 93

## 2022-11-09 NOTE — CONSULTS
2           Consultation - Nephrology   Edgardo Suarez 70 y o  male MRN: [id number]  Unit/Bed#: OR [place] Encounter: [id number]      Assessment/Plan     Assessment / Plan:  1  Renal    Patient appears have mild chronic renal insufficiency creatinine range 1 4-1 8 as an outpatient  I am seeing him postoperatively after elective right hip replacement  Patient stated that his blood pressure dropped he is receiving some fluid boluses but he is urinating normally  Creatinine preoperatively was 1 6 and it increased to 1 9 postoperatively  This could represent subtle volume depletion post anesthesia  Also had slight increases in LFTs as well  Medications reviewed no NSAIDs ordered  BMP a m  Continue supportive care  Continue with isotonic fluids as ordered till morning  Control blood sugars to help avoid volume depletion  2  Status post elective hip replacement  PT/OT  History of Present Illness   Physician Requesting Consult: Kal Ambrose DO  Reason for Consult / Principal Problem:  Chronic kidney disease  Hx and PE limited by:   HPI: Edgardo Suarez is a 70y o  year old male who has history of mild chronic kidney disease who was admitted electively to undergo right hip replacement for arthritis  We were asked to see him postoperatively he was awake alert and only had complaints of pain postoperatively and stated he was not able to stand yet  History obtained from chart review and the patient  Consults    Review of Systems   Constitutional: Negative for chills, diaphoresis, fatigue and fever  HENT: Negative  Eyes: Negative  Respiratory: Negative  Negative for cough, chest tightness, shortness of breath and wheezing  Cardiovascular: Negative  Negative for chest pain, palpitations and leg swelling  Gastrointestinal: Negative  Negative for abdominal pain, diarrhea, nausea and vomiting  Endocrine: Negative      Genitourinary: Negative for difficulty urinating, dysuria, frequency and hematuria  Musculoskeletal:        Postoperative pain in hip  Skin: Negative  Negative for rash  Neurological: Negative for dizziness, syncope, light-headedness and headaches  Psychiatric/Behavioral: Negative for agitation, behavioral problems, confusion and decreased concentration         Historical Information   Patient Active Problem List   Diagnosis   • Type 2 diabetes mellitus with hyperglycemia, with long-term current use of insulin ([facility] )   • Hypercholesterolemia with hypertriglyceridemia   • Primary hypertension   • Diabetic polyneuropathy associated with type 2 diabetes mellitus (HCC)   • Stage 3 chronic kidney disease, unspecified whether stage 3a or 3b CKD (HCC)   • Primary osteoarthritis of one hip, right   • Right hip pain   • CAD (coronary artery disease)   • Hx of chronic kidney disease   • Incomplete tear of right rotator cuff   • Osteoarthrosis, hand   • Primary osteoarthritis of knee   • Thoracic aortic aneurysm without rupture   • Gout     Past Medical History:   Diagnosis Date   • Arthritis    • Cervical disc disease     C2-7, needs repair   • Coronary artery disease     angina is pain in throat   • 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   • Hypertension    • TIA (transient ischemic attack)     X 2     Past Surgical History:   Procedure Laterality Date   • ANKLE ARTHROSCOPY Bilateral    • APPENDECTOMY     • CATARACT EXTRACTION, BILATERAL Bilateral    • CHOLECYSTECTOMY     • CORONARY ANGIOPLASTY WITH STENT PLACEMENT      2 stents in LAD   • ELBOW ARTHROSCOPY Bilateral     with right elbow tear repaired   • ERCP     • KNEE ARTHROSCOPY Right    • LUMBAR LAMINECTOMY      5 times   • MULTIPLE TOOTH EXTRACTIONS     • NOSE SURGERY  2021    fracture post a fall   • REPLACEMENT TOTAL KNEE Left    • SINUS SURGERY     • THUMB FUSION Bilateral     thumb repair with bone grafts   • TONSILLECTOMY AND ADENOIDECTOMY       Social History   Social History     Substance and Sexual Activity Alcohol Use Yes    Comment: occasionally a glass of wine once a month      Social History     Substance and Sexual Activity   Drug Use Not Currently     Social History     Tobacco Use   Smoking Status Former Smoker   Smokeless Tobacco Never Used     Family History   Problem Relation Age of Onset   • Uterine cancer Mother    • Colon cancer Father    • Cancer Sister         corneal   • No Known Problems Brother        Meds/Allergies   current meds:   Current Facility-Administered Medications   Medication Dose Route Frequency   • acetaminophen (TYLENOL) tablet 975 mg  975 mg Oral Q8H   • allopurinol (ZYLOPRIM) tablet 300 mg  300 mg Oral Daily   • aluminum-magnesium hydroxide-simethicone (MYLANTA) oral suspension 30 mL  30 mL Oral Q6H PRN   • amitriptyline (ELAVIL) tablet 50 mg  50 mg Oral HS   • ascorbic acid (VITAMIN C) tablet 500 mg  500 mg Oral BID   • aspirin (ECOTRIN LOW STRENGTH) EC tablet 81 mg  81 mg Oral BID   • atorvastatin (LIPITOR) tablet 40 mg  40 mg Oral Daily   • calcium carbonate (TUMS) chewable tablet 1,000 mg  1,000 mg Oral Daily PRN   • ceFAZolin (ANCEF) IVPB (premix in dextrose) 1,000 mg 50 mL  1,000 mg Intravenous Q8H   • cholecalciferol (VITAMIN D3) tablet 2,000 Units  2,000 Units Oral Daily   • docusate sodium (COLACE) capsule 100 mg  100 mg Oral BID   • ezetimibe (ZETIA) tablet 10 mg  10 mg Oral Daily   • folic acid (FOLVITE) tablet 1 mg  1 mg Oral Daily   • gabapentin (NEURONTIN) capsule 300 mg  300 mg Oral HS   • HYDROmorphone (DILAUDID) injection 0 4 mg  0 4 mg Intravenous Q5 Min PRN   • lactated ringers bolus 1,000 mL  1,000 mL Intravenous Once PRN    And   • lactated ringers bolus 1,000 mL  1,000 mL Intravenous Once PRN   • lactated ringers infusion  100 mL/hr Intravenous Continuous   • morphine injection 2 mg  2 mg Intravenous Q2H PRN   • multivitamin stress formula tablet 1 tablet  1 tablet Oral Daily   • nebivolol (BYSTOLIC) tablet 2 5 mg  2 5 mg Oral Daily   • NIFEdipine (PROCARDIA XL) 24 hr tablet 60 mg  60 mg Oral QPM   • ondansetron (ZOFRAN) injection 4 mg  4 mg Intravenous Once PRN   • ondansetron (ZOFRAN) injection 4 mg  4 mg Intravenous Q6H PRN   • oxyCODONE (ROXICODONE) IR tablet 10 mg  10 mg Oral Q4H PRN   • oxyCODONE (ROXICODONE) IR tablet 5 mg  5 mg Oral Q4H PRN   • potassium chloride (K-DUR,KLOR-CON) CR tablet 20 mEq  20 mEq Oral Daily   • senna (SENOKOT) tablet 8 6 mg  1 tablet Oral Daily   • sodium chloride 0 9 % bolus 1,000 mL  1,000 mL Intravenous Once PRN    And   • sodium chloride 0 9 % bolus 1,000 mL  1,000 mL Intravenous Once PRN     Allergies   Allergen Reactions   • Nitroglycerin Vomiting and Headache       Objective     Intake/Output Summary (Last 24 hours) at 11/9/2022 1507  Last data filed at 11/9/2022 1401  Gross per 24 hour   Intake 1950 ml   Output 400 ml   Net 1550 ml     There is no height or weight on file to calculate BMI  Invasive Devices:        PHYSICAL EXAM:  /57   Pulse 78   Temp 98 6 °F (37 °C)   Resp 22   SpO2 93%     Physical Exam  Constitutional:       General: He is not in acute distress  Appearance: He is not toxic-appearing or diaphoretic  HENT:      Head: Normocephalic and atraumatic  Nose: Nose normal       Mouth/Throat:      Mouth: Mucous membranes are dry  Eyes:      General: No scleral icterus  Extraocular Movements: Extraocular movements intact  Cardiovascular:      Rate and Rhythm: Normal rate and regular rhythm  Heart sounds: No friction rub  No gallop  Pulmonary:      Effort: Pulmonary effort is normal  No respiratory distress  Breath sounds: No wheezing, rhonchi or rales  Abdominal:      General: Bowel sounds are normal  There is no distension  Palpations: Abdomen is soft  Tenderness: There is no abdominal tenderness  There is no rebound  Musculoskeletal:      Cervical back: Normal range of motion and neck supple     Neurological:      Mental Status: He is alert and oriented to person, place, and time  Mental status is at baseline  Psychiatric:         Mood and Affect: Mood normal          Behavior: Behavior normal          Thought Content:  Thought content normal          Judgment: Judgment normal            Current Weight:    First Weight:      Lab Results:    Results from last 7 days   Lab Units 11/09/22  1130   WBC Thousand/uL 12 15*   HEMOGLOBIN g/dL 10 6*   HEMATOCRIT % 31 7*   PLATELETS Thousands/uL 238     Results from last 7 days   Lab Units 11/09/22  1130   POTASSIUM mmol/L 4 5   CHLORIDE mmol/L 103   CO2 mmol/L 25   BUN mg/dL 27*   CREATININE mg/dL 1 95*   CALCIUM mg/dL 8 5     Results from last 7 days   Lab Units 11/09/22  1130   POTASSIUM mmol/L 4 5   CHLORIDE mmol/L 103   CO2 mmol/L 25   BUN mg/dL 27*   CREATININE mg/dL 1 95*   CALCIUM mg/dL 8 5   ALK PHOS U/L 181*   ALT U/L 153*   AST U/L 184*

## 2022-11-09 NOTE — ASSESSMENT & PLAN NOTE
Lab Results   Component Value Date    HGBA1C 7 5 07/29/2022   Followed by Dr Liz Ibarra outpatient  Improved control over the last 18 months, A1c was previously 13  · Continue home regimen of Lantus 30 units q h s  And NovoLog 14 units t i d  With meals  Will add sliding scale    · Diabetic diet  · Monitor Accu-Cheks and adjust regimen as needed  · Of note, has Dexcom monitor outpatient

## 2022-11-09 NOTE — ASSESSMENT & PLAN NOTE
Baseline creatinine 1 4-1 8  1 95 today  · Continue intravenous fluids  · Avoid nephrotoxins and hypotension  · Nephrology has been consulted per primary team  · BALTAZAR in a m

## 2022-11-09 NOTE — CONSULTS
[address] [facility]  [address] 1950, 70 y o  male MRN: [id number]  Unit/Bed#: -01 Encounter: [id number]  Primary Care Provider: Hunter Tong DO   Date and time admitted to hospital: 11/9/2022  5:54 AM    Inpatient consult to Internal Medicine  Consult performed by: GUERRERO Crawford  Consult ordered by: Dandy Espinal PA-C          * Primary osteoarthritis of one hip, right  Assessment & Plan  Patient presents for elective right THR  Admitted to Orthopedic surgery  · Will defer pharmacological DVT prophylaxis to primary team  · Pain control  · Weight-bearing as tolerated  · PT/OT evaluations    Type 2 diabetes mellitus with hyperglycemia, with long-term current use of insulin [facility])  Assessment & Plan    Lab Results   Component Value Date    HGBA1C 7 5 07/29/2022   Followed by Dr Jennifer Stevens outpatient  Improved control over the last 18 months, A1c was previously 13  · Continue home regimen of Lantus 30 units q h s  And NovoLog 14 units t i d  With meals  Will add sliding scale  · Diabetic diet  · Monitor Accu-Cheks and adjust regimen as needed  · Of note, has Dexcom monitor outpatient     Primary hypertension  Assessment & Plan  Blood pressure acceptable  Continue home dose of Bystolic and Procardia  · Monitor    CAD (coronary artery disease)  Assessment & Plan  Remote history of PCI in 2008  · Continue aspirin, statin, beta-blocker     Gout  Assessment & Plan  · Continue allopurinol    Hx of chronic kidney disease  Assessment & Plan  Baseline creatinine 1 4-1 8  1 95 today  · Continue intravenous fluids  · Avoid nephrotoxins and hypotension  · Nephrology has been consulted per primary team  · BMP in a m  VTE Pharmacologic Prophylaxis: VTE Score: 10 SCDs ordered, defer DVT prophylaxis to primary team    Patient Centered Rounds: I performed bedside rounds with nursing staff today    Discussions with Specialists or Other Care Team Provider: Nursing    Education and Discussions with Family / Patient: Updated  (wife) at bedside  Time Spent for Care: 30 minutes  More than 50% of total time spent on counseling and coordination of care as described above  Current Length of Stay: 0 day(s)  Current Patient Status: Outpatient Surgery   Certification Statement: Keep as outpatient surgery status for further postoperative care  Discharge Plan: Anticipate discharge in 24-48 hrs to discharge location to be determined pending rehab evaluations  Code Status: No Order    Subjective:   Patient complains of some right hip pain postoperatively  Otherwise, no complaints  Objective:     Vitals:   Temp (24hrs), Av 1 °F (36 7 °C), Min:97 6 °F (36 4 °C), Max:98 6 °F (37 °C)    Temp:  [97 6 °F (36 4 °C)-98 6 °F (37 °C)] 98 3 °F (36 8 °C)  HR:  [72-82] 81  Resp:  [18-22] 18  BP: (112-146)/(57-79) 146/74  SpO2:  [92 %-99 %] 95 %  There is no height or weight on file to calculate BMI  Input and Output Summary (last 24 hours): Intake/Output Summary (Last 24 hours) at 2022 1556  Last data filed at 2022 1401  Gross per 24 hour   Intake 1950 ml   Output 400 ml   Net 1550 ml       Physical Exam:   Physical Exam  Vitals and nursing note reviewed  Constitutional:       General: He is not in acute distress  Cardiovascular:      Rate and Rhythm: Normal rate  Heart sounds: No murmur heard  Pulmonary:      Breath sounds: Normal breath sounds  Abdominal:      Tenderness: There is no abdominal tenderness  Musculoskeletal:         General: Tenderness (right hip) present  Skin:     General: Skin is warm  Neurological:      Mental Status: He is alert and oriented to person, place, and time  Mental status is at baseline     Psychiatric:         Mood and Affect: Mood normal           Additional Data:     Labs:  Results from last 7 days   Lab Units 22  1130   WBC Thousand/uL 12 15*   HEMOGLOBIN g/dL 10 6*   HEMATOCRIT % 31 7* PLATELETS Thousands/uL 238     Results from last 7 days   Lab Units 11/09/22  1130   SODIUM mmol/L 138   POTASSIUM mmol/L 4 5   CHLORIDE mmol/L 103   CO2 mmol/L 25   BUN mg/dL 27*   CREATININE mg/dL 1 95*   ANION GAP mmol/L 10   CALCIUM mg/dL 8 5   ALBUMIN g/dL 3 7   TOTAL BILIRUBIN mg/dL 1 30*   ALK PHOS U/L 181*   ALT U/L 153*   AST U/L 184*   GLUCOSE RANDOM mg/dL 264*     Results from last 7 days   Lab Units 11/09/22  0642   INR  1 00     Results from last 7 days   Lab Units 11/09/22  1102 11/09/22  0643   POC GLUCOSE mg/dl 233* 194*               Lines/Drains:  Invasive Devices  Report    Peripheral Intravenous Line  Duration           Peripheral IV 11/09/22 Right;Ventral (anterior) Hand <1 day                      Imaging: No pertinent imaging reviewed      Recent Cultures (last 7 days):         Last 24 Hours Medication List:   Current Facility-Administered Medications   Medication Dose Route Frequency Provider Last Rate   • acetaminophen  975 mg Oral Q8H Emmanuelle Meadows PA-C     • allopurinol  300 mg Oral Daily Emmanuelle Meadows PA-C     • aluminum-magnesium hydroxide-simethicone  30 mL Oral Q6H PRN Emmanuelle Meadows PA-C     • amitriptyline  50 mg Oral HS Emmanuelle Meadows PA-C     • ascorbic acid  500 mg Oral BID Emmanuelle Meadows PA-C     • aspirin  81 mg Oral BID Emmanuelle Meadows PA-C     • atorvastatin  40 mg Oral Daily Emmanuelle Meadows PA-C     • calcium carbonate  1,000 mg Oral Daily PRN Emmanuelle Meadows PA-C     • cefazolin  1,000 mg Intravenous Q8H Emmanuelle Meadows PA-C     • cholecalciferol  2,000 Units Oral Daily Emmanuelle Meadows PA-C     • docusate sodium  100 mg Oral BID Emmanuelle Meadows PA-C     • ezetimibe  10 mg Oral Daily Emmanuelle Meadows PA-C     • folic acid  1 mg Oral Daily Emmanuelle Meadows PA-C     • gabapentin  300 mg Oral HS Emmanuelle Meadows PA-C     • insulin glargine  30 Units Subcutaneous HS Emmanuelle Meadows PA-C     • insulin lispro  1-6 Units Subcutaneous TID GUERRERO Meza • insulin lispro  1-6 Units Subcutaneous HS GUERRERO Garcia     • insulin lispro  14 Units Subcutaneous TID With Meals Mimi Marlow PA-C     • lactated ringers  1,000 mL Intravenous Once PRN Mimi Marlow PA-C      And   • lactated ringers  1,000 mL Intravenous Once PRN Mimi Marlow PA-C     • lactated ringers  100 mL/hr Intravenous Continuous Mimi Marlow PA-C 100 mL/hr (11/09/22 3519)   • morphine injection  2 mg Intravenous Q2H PRN Mimi Marlow PA-C     • multivitamin stress formula  1 tablet Oral Daily Mimi Marlow PA-C     • nebivolol  2 5 mg Oral Daily Mimi Marlow PA-C     • NIFEdipine  60 mg Oral QPM Mimi Marlow PA-C     • ondansetron  4 mg Intravenous Q6H PRN Mimi Marlow PA-C     • oxyCODONE  10 mg Oral Q4H PRN Mimi Marlow PA-C     • oxyCODONE  5 mg Oral Q4H PRN Mimi Marlow PA-C     • potassium chloride  20 mEq Oral Daily Mimi Marlow PA-C     • senna  1 tablet Oral Daily Mimi Marlow PA-C     • sodium chloride  1,000 mL Intravenous Once PRN Mimi Marlow PA-C      And   • sodium chloride  1,000 mL Intravenous Once PRN Mimi Marlow PA-C          Today, Patient Was Seen By: Wesly Ryan    **Please Note: This note may have been constructed using a voice recognition system  **

## 2022-11-10 ENCOUNTER — TELEPHONE (OUTPATIENT)
Dept: NEPHROLOGY | Facility: CLINIC | Age: 72
End: 2022-11-10

## 2022-11-10 VITALS
DIASTOLIC BLOOD PRESSURE: 70 MMHG | RESPIRATION RATE: 16 BRPM | TEMPERATURE: 98.3 F | OXYGEN SATURATION: 98 % | HEART RATE: 81 BPM | SYSTOLIC BLOOD PRESSURE: 140 MMHG

## 2022-11-10 DIAGNOSIS — N18.30 STAGE 3 CHRONIC KIDNEY DISEASE, UNSPECIFIED WHETHER STAGE 3A OR 3B CKD (HCC): Primary | ICD-10-CM

## 2022-11-10 DIAGNOSIS — I10 PRIMARY HYPERTENSION: ICD-10-CM

## 2022-11-10 LAB
ANION GAP SERPL CALCULATED.3IONS-SCNC: 7 MMOL/L (ref 4–13)
BUN SERPL-MCNC: 31 MG/DL (ref 5–25)
CALCIUM SERPL-MCNC: 8 MG/DL (ref 8.3–10.1)
CHLORIDE SERPL-SCNC: 100 MMOL/L (ref 96–108)
CO2 SERPL-SCNC: 28 MMOL/L (ref 21–32)
CREAT SERPL-MCNC: 1.99 MG/DL (ref 0.6–1.3)
ERYTHROCYTE [DISTWIDTH] IN BLOOD BY AUTOMATED COUNT: 13.4 % (ref 11.6–15.1)
GFR SERPL CREATININE-BSD FRML MDRD: 32 ML/MIN/1.73SQ M
GLUCOSE SERPL-MCNC: 141 MG/DL (ref 65–140)
GLUCOSE SERPL-MCNC: 211 MG/DL (ref 65–140)
GLUCOSE SERPL-MCNC: 224 MG/DL (ref 65–140)
HCT VFR BLD AUTO: 27.9 % (ref 36.5–49.3)
HGB BLD-MCNC: 9.4 G/DL (ref 12–17)
MCH RBC QN AUTO: 32 PG (ref 26.8–34.3)
MCHC RBC AUTO-ENTMCNC: 33.7 G/DL (ref 31.4–37.4)
MCV RBC AUTO: 95 FL (ref 82–98)
PLATELET # BLD AUTO: 229 THOUSANDS/UL (ref 149–390)
PMV BLD AUTO: 9.9 FL (ref 8.9–12.7)
POTASSIUM SERPL-SCNC: 4.2 MMOL/L (ref 3.5–5.3)
RBC # BLD AUTO: 2.94 MILLION/UL (ref 3.88–5.62)
SODIUM SERPL-SCNC: 135 MMOL/L (ref 135–147)
WBC # BLD AUTO: 11.59 THOUSAND/UL (ref 4.31–10.16)

## 2022-11-10 RX ADMIN — Medication 2000 UNITS: at 08:40

## 2022-11-10 RX ADMIN — OXYCODONE HYDROCHLORIDE AND ACETAMINOPHEN 500 MG: 500 TABLET ORAL at 08:40

## 2022-11-10 RX ADMIN — FOLIC ACID 1 MG: 1 TABLET ORAL at 08:40

## 2022-11-10 RX ADMIN — POTASSIUM CHLORIDE 20 MEQ: 1500 TABLET, EXTENDED RELEASE ORAL at 08:39

## 2022-11-10 RX ADMIN — INSULIN LISPRO 2 UNITS: 100 INJECTION, SOLUTION INTRAVENOUS; SUBCUTANEOUS at 07:53

## 2022-11-10 RX ADMIN — B-COMPLEX W/ C & FOLIC ACID TAB 1 TABLET: TAB at 08:39

## 2022-11-10 RX ADMIN — INSULIN LISPRO 14 UNITS: 100 INJECTION, SOLUTION INTRAVENOUS; SUBCUTANEOUS at 13:02

## 2022-11-10 RX ADMIN — ASPIRIN 81 MG: 81 TABLET, COATED ORAL at 08:40

## 2022-11-10 RX ADMIN — EZETIMIBE 10 MG: 10 TABLET ORAL at 08:40

## 2022-11-10 RX ADMIN — ALLOPURINOL 300 MG: 300 TABLET ORAL at 08:40

## 2022-11-10 RX ADMIN — INSULIN LISPRO 14 UNITS: 100 INJECTION, SOLUTION INTRAVENOUS; SUBCUTANEOUS at 07:53

## 2022-11-10 RX ADMIN — ATORVASTATIN CALCIUM 40 MG: 40 TABLET, FILM COATED ORAL at 08:40

## 2022-11-10 RX ADMIN — OXYCODONE HYDROCHLORIDE 10 MG: 5 TABLET ORAL at 08:39

## 2022-11-10 RX ADMIN — ACETAMINOPHEN 975 MG: 325 TABLET, FILM COATED ORAL at 08:39

## 2022-11-10 RX ADMIN — ACETAMINOPHEN 975 MG: 325 TABLET, FILM COATED ORAL at 02:17

## 2022-11-10 RX ADMIN — CEFAZOLIN SODIUM 1000 MG: 1 SOLUTION INTRAVENOUS at 01:30

## 2022-11-10 RX ADMIN — SENNOSIDES 8.6 MG: 8.6 TABLET, FILM COATED ORAL at 08:39

## 2022-11-10 RX ADMIN — DOCUSATE SODIUM 100 MG: 100 CAPSULE, LIQUID FILLED ORAL at 08:39

## 2022-11-10 NOTE — PROGRESS NOTES
NEPHROLOGY PROGRESS NOTE   Mitali Darby 70 y o  male MRN: [id number]  Unit/Bed#: -01 Encounter: [id number]      HPI/24hr EVENTS:    -19-year-old male past medical history of DM 2, CKD 3, hypertension, hyperlipidemia TA, thoracic aortic aneurysm  Presented for elective right total hip arthroplasty by Orthopedic surgery  Nephrology consulted for NATHANIEL risk reduction setting of CKD    -renal function stable    ASSESSMENT/PLAN:    CKD 3  -Baseline creatinine:  1 4-1 8  -Creatinine on admission 1 63, most recent creatinine 1 99  -Etiology:  Suspect elevation at a baseline 2nd to mild volume depletion versus hemodynamic perturbations during anesthesia  -Avoid hypotension, avoid nephrotoxins, avoid NSAIDS  -Trend BMP  -stable for discharge from Nephrology standpoint patient reports he would like to follow with a nephrologist as an outpatient as he had prior discuss this with his endocrinologist, will send message to office to schedule outpatient appointment    NATHANIEL risk reduction  -avoid hypotension  -avoid NSAIDs  -can utilize IV fluids until patient is tolerating oral intake  -urinary retention protocol  -avoid/minimize IV dye use  -tight glycemic control    Status post right hip arthroplasty  -postop day 1 by Orthopedics  -postop care per Orthopedics    Hypertension  -on Procardia and Bystolic at home, currently continued  -recent blood pressure trends acceptable    Addition medical problems:  Dm 2 last A1c 7 5, gout on allopurinol, coronary artery disease    DISPOSITION:  Stable for discharge from Nephrology standpoint    SUBJECTIVE:      ROS:  Review of Systems   Constitutional: Negative  Eyes: Negative  Respiratory: Negative  Cardiovascular: Negative  Gastrointestinal: Negative  Genitourinary: Negative  Musculoskeletal: Negative           Mild right hip pain      A complete 10 point review of systems was performed and found to be negative unless otherwise noted above or in the HPI     OBJECTIVE:  Current Weight:    Vitals:    11/09/22 1948 11/09/22 2347 11/10/22 0317 11/10/22 0640   BP: 139/69 149/71 153/77 140/70   BP Location: Right arm Right arm Right arm Right arm   Pulse: 85 88 88 81   Resp: 16 16     Temp: (!) 100 6 °F (38 1 °C) 99 5 °F (37 5 °C)  98 3 °F (36 8 °C)   TempSrc: Oral Oral  Oral   SpO2: 90% 92%  98%       Intake/Output Summary (Last 24 hours) at 11/10/2022 1149  Last data filed at 11/10/2022 0920  Gross per 24 hour   Intake 1710 ml   Output 2275 ml   Net -565 ml     Physical Exam  Vitals and nursing note reviewed  Constitutional:       General: He is not in acute distress  Appearance: Normal appearance  He is not toxic-appearing or diaphoretic  HENT:      Head: Normocephalic and atraumatic  Nose: Nose normal       Mouth/Throat:      Mouth: Mucous membranes are moist    Eyes:      General: No scleral icterus  Cardiovascular:      Rate and Rhythm: Normal rate  Pulses: Normal pulses  Pulmonary:      Effort: Pulmonary effort is normal  No respiratory distress  Abdominal:      General: Abdomen is flat  Musculoskeletal:      Cervical back: Neck supple  Right lower leg: No edema  Left lower leg: No edema  Skin:     General: Skin is warm and dry  Capillary Refill: Capillary refill takes less than 2 seconds  Neurological:      General: No focal deficit present  Mental Status: He is alert and oriented to person, place, and time            Medications:    Current Facility-Administered Medications:   •  acetaminophen (TYLENOL) tablet 975 mg, 975 mg, Oral, Q8H, Gloria Soto PA-C, 975 mg at 11/10/22 6718  •  allopurinol (ZYLOPRIM) tablet 300 mg, 300 mg, Oral, Daily, Gloria Soto PA-C, 300 mg at 11/10/22 0840  •  aluminum-magnesium hydroxide-simethicone (MYLANTA) oral suspension 30 mL, 30 mL, Oral, Q6H PRN, Mora Schwab PA-C  •  amitriptyline (ELAVIL) tablet 50 mg, 50 mg, Oral, HS, Mora Schwab PA-C, 50 mg at 11/09/22 2219  •  ascorbic acid (VITAMIN C) tablet 500 mg, 500 mg, Oral, BID, Perry Soto PA-C, 500 mg at 11/10/22 0840  •  aspirin (ECOTRIN LOW STRENGTH) EC tablet 81 mg, 81 mg, Oral, BID, Mimi Marlow PA-C, 81 mg at 11/10/22 0840  •  atorvastatin (LIPITOR) tablet 40 mg, 40 mg, Oral, Daily, Perry Soto PA-C, 40 mg at 11/10/22 0840  •  calcium carbonate (TUMS) chewable tablet 1,000 mg, 1,000 mg, Oral, Daily PRN, Mimi Marlow PA-C  •  cholecalciferol (VITAMIN D3) tablet 2,000 Units, 2,000 Units, Oral, Daily, Mimi Marlow PA-C, 2,000 Units at 11/10/22 0840  •  docusate sodium (COLACE) capsule 100 mg, 100 mg, Oral, BID, Mimi Marlow PA-C, 100 mg at 11/10/22 3132  •  ezetimibe (ZETIA) tablet 10 mg, 10 mg, Oral, Daily, Perry Soto PA-C, 10 mg at 59/87/56 4026  •  folic acid (FOLVITE) tablet 1 mg, 1 mg, Oral, Daily, Mimi Marlow PA-C, 1 mg at 11/10/22 0840  •  gabapentin (NEURONTIN) capsule 300 mg, 300 mg, Oral, HS, Mimi Marlow PA-C, 300 mg at 11/09/22 2219  •  insulin glargine (LANTUS) subcutaneous injection 30 Units 0 3 mL, 30 Units, Subcutaneous, HS, Mimi Marlow PA-C, 30 Units at 11/09/22 2217  •  insulin lispro (HumaLOG) 100 units/mL subcutaneous injection 1-6 Units, 1-6 Units, Subcutaneous, TID AC, 2 Units at 11/10/22 0753 **AND** Fingerstick Glucose (POCT), , , TID AC, GUERRERO Garcia  •  insulin lispro (HumaLOG) 100 units/mL subcutaneous injection 1-6 Units, 1-6 Units, Subcutaneous, HS, GUERRERO Garcia, 2 Units at 11/09/22 2218  •  insulin lispro (HumaLOG) 100 units/mL subcutaneous injection 14 Units, 14 Units, Subcutaneous, TID With Meals, Mimi Marlow PA-C, 14 Units at 11/10/22 8433  •  morphine injection 2 mg, 2 mg, Intravenous, Q2H PRN, Mimi Marlow PA-C  •  multivitamin stress formula tablet 1 tablet, 1 tablet, Oral, Daily, Mimi Marlow PA-C, 1 tablet at 11/10/22 9033  •  nebivolol (BYSTOLIC) tablet 2 5 mg, 2 5 mg, Oral, Daily, Mimi Marlow PA-C, 2 5 mg at 11/09/22 2219  •  NIFEdipine (PROCARDIA XL) 24 hr tablet 60 mg, 60 mg, Oral, QPM, Hayde Soto PA-C, 60 mg at 11/09/22 1735  •  ondansetron [facility]) injection 4 mg, 4 mg, Intravenous, Q6H PRN, Danae Zepeda PA-C  •  oxyCODONE (ROXICODONE) IR tablet 10 mg, 10 mg, Oral, Q4H PRN, Danae Zepeda PA-C, 10 mg at 11/10/22 8710  •  oxyCODONE (ROXICODONE) IR tablet 5 mg, 5 mg, Oral, Q4H PRN, Danae Zepeda PA-C, 5 mg at 11/09/22 1118  •  potassium chloride (K-DUR,KLOR-CON) CR tablet 20 mEq, 20 mEq, Oral, Daily, Danae Zepeda PA-C, 20 mEq at 11/10/22 4284  •  senna (SENOKOT) tablet 8 6 mg, 1 tablet, Oral, Daily, Danae Zepeda PA-C, 8 6 mg at 11/10/22 5087    Laboratory Results:  Results from last 7 days   Lab Units 11/10/22  0223 11/09/22  1130 11/09/22  0642   WBC Thousand/uL 11 59* 12 15* 7 99   HEMOGLOBIN g/dL 9 4* 10 6* 13 2   HEMATOCRIT % 27 9* 31 7* 39 0   PLATELETS Thousands/uL 229 238 275   POTASSIUM mmol/L 4 2 4 5 3 9   CHLORIDE mmol/L 100 103 102   CO2 mmol/L 28 25 25   BUN mg/dL 31* 27* 25   CREATININE mg/dL 1 99* 1 95* 1 63*   CALCIUM mg/dL 8 0* 8 5 8 8       I have personally reviewed the blood work as stated above and in my note  I have personally reviewed internal medicine and orthopedics note

## 2022-11-10 NOTE — PROGRESS NOTES
[place]  Progress Note - Maya Doshiyanet 1950, 70 y o  male MRN: [id number]  Unit/Bed#: -01 Encounter: [id number]  Primary Care Provider: Janell Zepeda DO   Date and time admitted to hospital: 11/9/2022  5:54 AM    * Primary osteoarthritis of one hip, right  Assessment & Plan  · Patient presented for elective right THR  Admitted to Orthopedic surgery  · Will defer pharmacological DVT prophylaxis to primary team  · Pain control per primary  · Weight-bearing per Ortho  · PT/OT evaluations    Type 2 diabetes mellitus with hyperglycemia, with long-term current use of insulin [facility])  Assessment & Plan    Lab Results   Component Value Date    HGBA1C 7 5 07/29/2022   · Followed by Dr Ena Hogan outpatient  Improved control over the last 18 months, A1c was previously 13  · Continue home regimen of Lantus 30 units q h s  And NovoLog 14 units t i d  With meals  · Continue SSI while admitted   · Diabetic diet  · Monitor Accu-Cheks and adjust regimen as needed  · Of note, has Dexcom monitor outpatient     Hx of chronic kidney disease  Assessment & Plan  · Baseline creatinine 1 4-1 8   1 99 today  · Avoid nephrotoxins and hypotension  · Nephrology following     Primary hypertension  Assessment & Plan  · Blood pressure acceptable  Continue home dose of Bystolic and Procardia  · Monitor  · Avoid hypotension     CAD (coronary artery disease)  Assessment & Plan  · Remote history of PCI in 2008  · Continue aspirin, statin, beta-blocker     Gout  Assessment & Plan  · Continue allopurinol        VTE Pharmacologic Prophylaxis: VTE Score: 10 aspirin 81mg BID per primary, Ortho  Defer to primary    Patient Centered Rounds: I performed bedside rounds with nursing staff today  Discussions with Specialists or Other Care Team Provider:     Education and Discussions with Family / Patient: Patient declined call to   Time Spent for Care: 20 minutes   More than 50% of total time spent on counseling and coordination of care as described above  Current Length of Stay: 0 day(s)  Current Patient Status: Outpatient Surgery   Certification Statement: per primary, Ortho  Discharge Plan: Dominique Wallace is following this patient on consult  They are medically stable for discharge when deemed appropriate by primary service  Code Status: No Order    Subjective:   Mr Luca Vega reports walking down the torres and doing stairs with therapy  He says he is ready to go  He reports that his pain is controlled  Her reports urinating without difficulty but reports no BM yet - he says he is passing gas, denies abdominal pain/nausea/vomiting  Denies CP, SOB    Objective:     Vitals:   Temp (24hrs), Av 7 °F (37 1 °C), Min:97 9 °F (36 6 °C), Max:100 6 °F (38 1 °C)    Temp:  [97 9 °F (36 6 °C)-100 6 °F (38 1 °C)] 98 3 °F (36 8 °C)  HR:  [72-88] 81  Resp:  [16-22] 16  BP: (112-153)/(57-79) 140/70  SpO2:  [90 %-99 %] 98 %  There is no height or weight on file to calculate BMI  Input and Output Summary (last 24 hours): Intake/Output Summary (Last 24 hours) at 11/10/2022 1010  Last data filed at 11/10/2022 0920  Gross per 24 hour   Intake 2410 ml   Output 2475 ml   Net -65 ml       Physical Exam:   Physical Exam  Vitals reviewed  Constitutional:       Comments: Patient seen sitting in bedside chair, NAD   Cardiovascular:      Rate and Rhythm: Normal rate and regular rhythm  Pulmonary:      Effort: Pulmonary effort is normal  No respiratory distress  Breath sounds: Normal breath sounds  Abdominal:      General: Bowel sounds are normal       Palpations: Abdomen is soft  Tenderness: There is no abdominal tenderness  Musculoskeletal:      Right lower leg: No edema  Left lower leg: No edema  Skin:     General: Skin is warm  Neurological:      Mental Status: He is alert and oriented to person, place, and time     Psychiatric:         Mood and Affect: Mood normal          Behavior: Behavior normal           Additional Data:     Labs:  Results from last 7 days   Lab Units 11/10/22  0223   WBC Thousand/uL 11 59*   HEMOGLOBIN g/dL 9 4*   HEMATOCRIT % 27 9*   PLATELETS Thousands/uL 229     Results from last 7 days   Lab Units 11/10/22  0223 11/09/22  1130   SODIUM mmol/L 135 138   POTASSIUM mmol/L 4 2 4 5   CHLORIDE mmol/L 100 103   CO2 mmol/L 28 25   BUN mg/dL 31* 27*   CREATININE mg/dL 1 99* 1 95*   ANION GAP mmol/L 7 10   CALCIUM mg/dL 8 0* 8 5   ALBUMIN g/dL  --  3 7   TOTAL BILIRUBIN mg/dL  --  1 30*   ALK PHOS U/L  --  181*   ALT U/L  --  153*   AST U/L  --  184*   GLUCOSE RANDOM mg/dL 224* 264*     Results from last 7 days   Lab Units 11/09/22  0642   INR  1 00     Results from last 7 days   Lab Units 11/10/22  0749 11/09/22  2200 11/09/22  1753 11/09/22  1102 11/09/22  0643   POC GLUCOSE mg/dl 211* 210* 280* 233* 194*               Lines/Drains:  Invasive Devices  Report    Peripheral Intravenous Line  Duration           Peripheral IV 11/09/22 Right;Ventral (anterior) Hand 1 day                      Imaging: No pertinent imaging reviewed      Recent Cultures (last 7 days):         Last 24 Hours Medication List:   Current Facility-Administered Medications   Medication Dose Route Frequency Provider Last Rate   • acetaminophen  975 mg Oral Q8H Dallas Spear PA-C     • allopurinol  300 mg Oral Daily Dallas Spear PA-C     • aluminum-magnesium hydroxide-simethicone  30 mL Oral Q6H PRN Dallas Spear PA-C     • amitriptyline  50 mg Oral HS Dallas Spear PA-C     • ascorbic acid  500 mg Oral BID Dallas Spear PA-C     • aspirin  81 mg Oral BID Dallas Spear PA-C     • atorvastatin  40 mg Oral Daily Dallas Spear PA-C     • calcium carbonate  1,000 mg Oral Daily PRN Dallas Spera PA-C     • cholecalciferol  2,000 Units Oral Daily Dallas Spear PA-C     • docusate sodium  100 mg Oral BID Dallas Spear PA-C     • ezetimibe  10 mg Oral Daily Dallas Spear PA-C     • folic acid 1 mg Oral Daily Yi Cordero PA-C     • gabapentin  300 mg Oral HS Yi Cordero, PA-C     • insulin glargine  30 Units Subcutaneous HS Yi Cordero PA-C     • insulin lispro  1-6 Units Subcutaneous TID AC GUERRERO Garcia     • insulin lispro  1-6 Units Subcutaneous HS GUERRERO Garcia     • insulin lispro  14 Units Subcutaneous TID With Meals Yi Cordero PA-C     • lactated ringers  1,000 mL Intravenous Once PRN SEPNSER Sutherland-C      And   • lactated ringers  1,000 mL Intravenous Once PRN Yi Cordero, PA-C     • morphine injection  2 mg Intravenous Q2H PRN Yi Cordero PA-C     • multivitamin stress formula  1 tablet Oral Daily Yi Cordero PA-C     • nebivolol  2 5 mg Oral Daily Yi Cordero, PA-C     • NIFEdipine  60 mg Oral QPM Yi Cordero, PA-C     • ondansetron  4 mg Intravenous Q6H PRN Yi Cordero PA-C     • oxyCODONE  10 mg Oral Q4H PRN Yi Cordero, PA-C     • oxyCODONE  5 mg Oral Q4H PRN Yi Blekyler, PA-C     • potassium chloride  20 mEq Oral Daily Yi Cordero PA-C     • senna  1 tablet Oral Daily Yi Cordero PA-C     • sodium chloride  1,000 mL Intravenous Once PRN Yi Cordero PA-C      And   • sodium chloride  1,000 mL Intravenous Once PRN Yi Cordero PA-C          Today, Patient Was Seen By: Randi Mcpherson    **Please Note: This note may have been constructed using a voice recognition system  **

## 2022-11-10 NOTE — ASSESSMENT & PLAN NOTE
· Blood pressure acceptable  Continue home dose of Bystolic and Procardia    · Monitor  · Avoid hypotension

## 2022-11-10 NOTE — PLAN OF CARE
Problem: Prexisting or High Potential for Compromised Skin Integrity  Goal: Skin integrity is maintained or improved  Description: INTERVENTIONS:  - Identify patients at risk for skin breakdown  - Assess and monitor skin integrity  - Assess and monitor nutrition and hydration status  - Monitor labs   - Assess for incontinence   - Turn and reposition patient  - Assist with mobility/ambulation  - Relieve pressure over bony prominences  - Avoid friction and shearing  - Provide appropriate hygiene as needed including keeping skin clean and dry  - Evaluate need for skin moisturizer/barrier cream  - Collaborate with interdisciplinary team   - Patient/family teaching  - Consider wound care consult   Outcome: Progressing     Problem: MOBILITY - ADULT  Goal: Maintain or return to baseline ADL function  Description: INTERVENTIONS:  -  Assess patient's ability to carry out ADLs; assess patient's baseline for ADL function and identify physical deficits which impact ability to perform ADLs (bathing, care of mouth/teeth, toileting, grooming, dressing, etc )  - Assess/evaluate cause of self-care deficits   - Assess range of motion  - Assess patient's mobility; develop plan if impaired  - Assess patient's need for assistive devices and provide as appropriate  - Encourage maximum independence but intervene and supervise when necessary  - Involve family in performance of ADLs  - Assess for home care needs following discharge   - Consider OT consult to assist with ADL evaluation and planning for discharge  - Provide patient education as appropriate  Outcome: Progressing  Goal: Maintains/Returns to pre admission functional level  Description: INTERVENTIONS:  - Perform BMAT or MOVE assessment daily    - Set and communicate daily mobility goal to care team and patient/family/caregiver  - Collaborate with rehabilitation services on mobility goals if consulted  - Perform Range of Motion 3 times a day    - Reposition patient every 3 hours   - Dangle patient 3 times a day  - Stand patient 3 times a day  - Ambulate patient 3 times a day  - Out of bed to chair 3 times a day   - Out of bed for meals 3 times a day  - Out of bed for toileting  - Record patient progress and toleration of activity level   Outcome: Progressing

## 2022-11-10 NOTE — PLAN OF CARE
Problem: PHYSICAL THERAPY ADULT  Goal: Performs mobility at highest level of function for planned discharge setting  See evaluation for individualized goals  Description: Treatment/Interventions: Functional transfer training, LE strengthening/ROM, Elevations, Therapeutic exercise, Endurance training, Patient/family training, Equipment eval/education, Bed mobility, Gait training, Spoke to nursing, OT          See flowsheet documentation for full assessment, interventions and recommendations  Outcome: Progressing  Note: Prognosis: Good  Problem List: Decreased strength, Decreased endurance, Impaired balance, Decreased mobility, Pain  Assessment: Patient was received supine in bed  Has not been OOB since P T  session in PACU yesterday  Orthostatic BP taken-stable  Patient progressed this session and was able to perform bed mobility, transfers, ambulation and stairs at a supervision level  Educated patient on proper use of RW and proper stair technique  Patient will continue to benefit from ongoing inpatient P T  Recommending outpatient P T  at discharge  The patient's AM-PAC Basic Mobility Inpatient Short Form Raw Score is 20  A Raw score of greater than 17 suggests the patient may benefit from discharge to home  Please also refer to the recommendation of the Physical Therapist for safe discharge planning  Barriers to Discharge: None     PT Discharge Recommendation: Home with outpatient rehabilitation    See flowsheet documentation for full assessment

## 2022-11-10 NOTE — ASSESSMENT & PLAN NOTE
· Baseline creatinine 1 4-1 8   1 99 today    · Avoid nephrotoxins and hypotension  · Nephrology following

## 2022-11-10 NOTE — ASSESSMENT & PLAN NOTE
· Patient presented for elective right THR    Admitted to Orthopedic surgery  · Will defer pharmacological DVT prophylaxis to primary team  · Pain control per primary  · Weight-bearing per Ortho  · PT/OT evaluations

## 2022-11-10 NOTE — CASE MANAGEMENT
Case Management Progress Note    Patient name Angela Villalobos  Location [id number] MRN [id number]  : 1950 Date 11/10/2022       LOS (days): 0  Geometric Mean LOS (GMLOS) (days):   Days to GMLOS:        OBJECTIVE:        Current admission status: Outpatient Surgery  Preferred Pharmacy:   [address] [place] [address],[facility], [address]  [address] [address] [place] [address]  Phone: [phone] Fax: [phone]    Primary Care Provider: April Kehr, DO    Primary Insurance: [facility]  Secondary Insurance:     PROGRESS NOTE:    Met with pt to discuss the role of CM and to discuss any help pt may need prior to dc  Pt lives with SO and was indpt pta, pt has a RW and cane at home  Pt is scheduled for outpt therapy on Monday  Pt's SO will transport home  No additional dc needs

## 2022-11-10 NOTE — PROGRESS NOTES
Progress Note - Orthopedics   Dianne Patrick 70 y o  male MRN: [id number]  Unit/Bed#: UB 2 MED SURG      Subjective:    70 y o male postop day 1 right anterior total hip arthroplasty performed by Dr Jeni Younger on November 9, 2022  No acute events, no new complaints  Patient doing well  Pain well controlled  Denies fevers, chills, CP, SOB, N/V, numbness or tingling  Patient reports no issues with urination or bowel movements  Patient states he is ready to go home today  He feels that his home situation is proper for him to ambulate in       Labs:  0   Lab Value Date/Time    HCT 27 9 (L) 11/10/2022 0223    HCT 31 7 (L) 11/09/2022 1130    HCT 39 0 11/09/2022 0642    HGB 9 4 (L) 11/10/2022 0223    HGB 10 6 (L) 11/09/2022 1130    HGB 13 2 11/09/2022 0642    INR 1 00 11/09/2022 0642    WBC 11 59 (H) 11/10/2022 0223    WBC 12 15 (H) 11/09/2022 1130    WBC 7 99 11/09/2022 0642       Meds:    Current Facility-Administered Medications:   •  acetaminophen (TYLENOL) tablet 975 mg, 975 mg, Oral, Q8H, Romaine Soto PA-C, 975 mg at 11/10/22 0217  •  allopurinol (ZYLOPRIM) tablet 300 mg, 300 mg, Oral, Daily, Romaine Soto PA-C, 300 mg at 11/09/22 1734  •  aluminum-magnesium hydroxide-simethicone (MYLANTA) oral suspension 30 mL, 30 mL, Oral, Q6H PRN, Bettye Sky PA-C  •  amitriptyline (ELAVIL) tablet 50 mg, 50 mg, Oral, HS, Bettye Sky PA-C, 50 mg at 11/09/22 2219  •  ascorbic acid (VITAMIN C) tablet 500 mg, 500 mg, Oral, BID, Romaine Soto PA-C, 500 mg at 11/09/22 1735  •  aspirin (ECOTRIN LOW STRENGTH) EC tablet 81 mg, 81 mg, Oral, BID, Romaine Soto PA-C, 81 mg at 11/09/22 1735  •  atorvastatin (LIPITOR) tablet 40 mg, 40 mg, Oral, Daily, Levocarlee Soto PA-C, 40 mg at 11/09/22 1734  •  calcium carbonate (TUMS) chewable tablet 1,000 mg, 1,000 mg, Oral, Daily PRN, Bettye Sky PA-C  •  cholecalciferol (VITAMIN D3) tablet 2,000 Units, 2,000 Units, Oral, Daily, Bettye Sky PA-C, 2,000 Units at 11/09/22 1734  •  docusate sodium (COLACE) capsule 100 mg, 100 mg, Oral, BID, Magnus Soto PA-C, 100 mg at 11/09/22 1735  •  ezetimibe (ZETIA) tablet 10 mg, 10 mg, Oral, Daily, Magnus Soto PA-C, 10 mg at 44/45/22 7490  •  folic acid (FOLVITE) tablet 1 mg, 1 mg, Oral, Daily, Eliza Prescott PA-C, 1 mg at 11/09/22 1734  •  gabapentin (NEURONTIN) capsule 300 mg, 300 mg, Oral, HS, Eliza Prescott PA-C, 300 mg at 11/09/22 2219  •  insulin glargine (LANTUS) subcutaneous injection 30 Units 0 3 mL, 30 Units, Subcutaneous, HS, Eliza Prescott PA-C, 30 Units at 11/09/22 2217  •  insulin lispro (HumaLOG) 100 units/mL subcutaneous injection 1-6 Units, 1-6 Units, Subcutaneous, TID AC, 4 Units at 11/09/22 1805 **AND** Fingerstick Glucose (POCT), , , TID AC, GUERRERO Garcia  •  insulin lispro (HumaLOG) 100 units/mL subcutaneous injection 1-6 Units, 1-6 Units, Subcutaneous, HS, GUERRERO Garcia, 2 Units at 11/09/22 2218  •  insulin lispro (HumaLOG) 100 units/mL subcutaneous injection 14 Units, 14 Units, Subcutaneous, TID With Meals, Eliza Prescott PA-C, 14 Units at 11/09/22 1805  •  lactated ringers bolus 1,000 mL, 1,000 mL, Intravenous, Once PRN **AND** lactated ringers bolus 1,000 mL, 1,000 mL, Intravenous, Once PRN, Eliza Prescott PA-C  •  lactated ringers infusion, 100 mL/hr, Intravenous, Continuous, Eliza Prescott PA-C, Last Rate: 100 mL/hr at 11/09/22 1538, 100 mL/hr at 11/09/22 1538  •  morphine injection 2 mg, 2 mg, Intravenous, Q2H PRN, Eliza Prescott PA-C  •  multivitamin stress formula tablet 1 tablet, 1 tablet, Oral, Daily, Eliza Prescott PA-C, 1 tablet at 11/09/22 1753  •  nebivolol (BYSTOLIC) tablet 2 5 mg, 2 5 mg, Oral, Daily, Eliza Prescott PA-C, 2 5 mg at 11/09/22 2219  •  NIFEdipine (PROCARDIA XL) 24 hr tablet 60 mg, 60 mg, Oral, QPM, Magnus Soto PA-C, 60 mg at 11/09/22 1735  •  ondansetron (ZOFRAN) injection 4 mg, 4 mg, Intravenous, Q6H PRN, Eliza Prescott PA-C  •  oxyCODONE (ROXICODONE) IR tablet 10 mg, 10 mg, Oral, Q4H PRN, Dea Holland, PA-C, 10 mg at 11/09/22 2216  •  oxyCODONE (ROXICODONE) IR tablet 5 mg, 5 mg, Oral, Q4H PRN, SPENSER Landrum-C, 5 mg at 11/09/22 1118  •  potassium chloride (K-DUR,KLOR-CON) CR tablet 20 mEq, 20 mEq, Oral, Daily, Dea Holland PA-C, 20 mEq at 11/09/22 1734  •  senna (SENOKOT) tablet 8 6 mg, 1 tablet, Oral, Daily, Romana Soto PA-C, 8 6 mg at 11/09/22 1734  •  sodium chloride 0 9 % bolus 1,000 mL, 1,000 mL, Intravenous, Once PRN **AND** sodium chloride 0 9 % bolus 1,000 mL, 1,000 mL, Intravenous, Once PRN, Dea Holland PA-C    Blood Culture:   No results found for: BLOODCX    Wound Culture:   No results found for: WOUNDCULT    Ins and Outs:  I/O last 24 hours: In: 4782 [P O :480; I V :1700; IV Piggyback:250]  Out: 0281 [Urine:1575; Blood:200]          Physical:  Vitals:    11/10/22 0640   BP: 140/70   Pulse: 81   Resp:    Temp: 98 3 °F (36 8 °C)   SpO2: 98%     Musculoskeletal: right Lower Extremity  · Skin warm and dry   No erythema or ecchymosis  · Dressing clean, dry, and intact  No strike through seen  · TTP in the yulissa-incisional area  · Sensation intact to saphenous, sural, tibial, superficial peroneal nerve, and deep peroneal  · Compartments soft and compressible  · Motor intact to +FHL/EHL, +ankle dorsi/plantar flexion  · 2+ DP pulse, symmetric bilaterally  · Digits warm and well perfused  · Capillary refill < 2 seconds    Assessment:    71 y o male postop day 1 for right anterior total hip arthroplasty   Patient doing well, ready to go home  Plan:  · Weightbearing as tolerated right lower extremity  · PT/OT  · Pain control  · DVT ppx aspirin 81 mg b i d  postoperatively  · Medical co-morbidities include diabetes, which are being managed per primary team  · Follow-up with Dr Verónica Emery as a outpatient for postoperative evaluation    · Dispo: George Stauffer for discharge from [facility], PA-C

## 2022-11-10 NOTE — PLAN OF CARE
Problem: OCCUPATIONAL THERAPY ADULT  Goal: Performs self-care activities at highest level of function for planned discharge setting  See evaluation for individualized goals  Description: Treatment Interventions: ADL retraining, Functional transfer training, Endurance training, Patient/family training, Equipment evaluation/education, Compensatory technique education, Energy conservation, Continued evaluation          See flowsheet documentation for full assessment, interventions and recommendations  Outcome: Progressing  Note: Limitation: Decreased ADL status, Decreased self-care trans, Decreased high-level ADLs  Prognosis: Good  Assessment: Pt seen for OT tx session with focus on functional balance, functional mobility, ADL status, and transfer safety  Patient agreeable to OT treatment session  Pt received supine in bed  Pt performed all bed mobility, functional transfers, & functional mobility with S  Pt able to mobilize with RW at functional household distance  Pt performed LB dressing with S  Patient continues to be functioning below baseline level, occupational performance remains limited secondary to factors listed above, and pt at increased risk for falls and injury  The patient's raw score on the AM-PAC Daily Activity inpatient short form is 23, standardized score is 51 12, greater than 39 4  Patients at this level are likely to benefit from DC to home  Please refer to the recommendation of the Occupational Therapist for safe DC planning  From OT standpoint, recommendation at time of D/C would be Home with family support  Patient to benefit from continued Occupational Therapy treatment while in the hospital to address deficits as defined above and maximize level of functional independence with ADLs and functional mobility  Pt left seated OOB to Recliner with call bell in reach, tray table in reach, needs met, chair alarm activated, RN informed       OT Discharge Recommendation: No rehabilitation needs

## 2022-11-10 NOTE — TELEPHONE ENCOUNTER
----- Message from GUERRERO Cuellar sent at 11/10/2022 11:49 AM EST -----  Regarding:  Follow-up  Debo Pardo was seen at [facility] after right hip surgery, he would like to follow with the [facility] team, please schedule appointment in 2-3 months the [facility] office, with a repeat BMP prior to that appointment

## 2022-11-10 NOTE — ASSESSMENT & PLAN NOTE
Lab Results   Component Value Date    HGBA1C 7 5 07/29/2022   · Followed by Dr Domo Clayton outpatient  Improved control over the last 18 months, A1c was previously 13  · Continue home regimen of Lantus 30 units q h s  And NovoLog 14 units t i d  With meals      · Continue SSI while admitted   · Diabetic diet  · Monitor Accu-Cheks and adjust regimen as needed  · Of note, has Dexcom monitor outpatient

## 2022-11-10 NOTE — OCCUPATIONAL THERAPY NOTE
Occupational Therapy Tx Note     Patient Name: Johana Sands  SPCDX'D Date: 11/10/2022  Problem List  Principal Problem:    Primary osteoarthritis of one hip, right  Active Problems:    Type 2 diabetes mellitus with hyperglycemia, with long-term current use of insulin ([facility])    Primary hypertension    CAD (coronary artery disease)    Hx of chronic kidney disease    Gout              11/10/22 0952   OT Last Visit   OT Visit Date 11/10/22   Note Type   Note Type Treatment   Pain Assessment   Pain Assessment Tool 0-10   Pain Score 6   Pain Location/Orientation Orientation: Right;Location: Hip   Restrictions/Precautions   Weight Bearing Precautions Per Order Yes   RLE Weight Bearing Per Order WBAT   Other Precautions Pain; Fall Risk   ADL   Eating Assistance 7  Independent   LB Dressing Assistance 5  Supervision/Setup   LB Dressing Deficit Don/doff R sock; Don/doff L sock   LB Dressing Comments Declines donning scrub pants   Bed Mobility   Supine to Sit 5  Supervision   Additional items HOB elevated   Additional Comments Remains OOB to recliner   Transfers   Sit to Stand 5  Supervision   Additional items Armrests; Verbal cues   Stand to Sit 6  Modified independent   Additional items Verbal cues;Armrests   Functional Mobility   Functional Mobility 5  Supervision   Additional Comments Functional household distance   Able to ascend/descend standard height step with two hand hold on rail   Additional items Rolling walker   Subjective   Subjective "I'm doing much better"   Cognition   Overall Cognitive Status WFL   Arousal/Participation Alert   Attention Within functional limits   Orientation Level Oriented X4   Memory Within functional limits   Following Commands Follows all commands and directions without difficulty   Activity Tolerance   Activity Tolerance Patient tolerated treatment well   Medical Staff Made Aware PT DO Dante Bo CM Lorina Borg, YUSUF Vargas   Assessment   Assessment Pt seen for OT tx session with focus on functional balance, functional mobility, ADL status, and transfer safety  Patient agreeable to OT treatment session  Pt received supine in bed  Pt performed all bed mobility, functional transfers, & functional mobility with S  Pt able to mobilize with RW at functional household distance  Pt performed LB dressing with S  Patient continues to be functioning below baseline level, occupational performance remains limited secondary to factors listed above, and pt at increased risk for falls and injury  The patient's raw score on the AM-PAC Daily Activity inpatient short form is 23, standardized score is 51 12, greater than 39 4  Patients at this level are likely to benefit from DC to home  Please refer to the recommendation of the Occupational Therapist for safe DC planning  From OT standpoint, recommendation at time of D/C would be Home with family support  Patient to benefit from continued Occupational Therapy treatment while in the hospital to address deficits as defined above and maximize level of functional independence with ADLs and functional mobility  Pt left seated OOB to Recliner with call bell in reach, tray table in reach, needs met, chair alarm activated, RN informed  Plan   Treatment Interventions ADL retraining;Functional transfer training; Endurance training;Patient/family training;Equipment evaluation/education; Compensatory technique education; Energy conservation; Activityengagement   Goal Expiration Date 11/14/22   OT Treatment Day 1   OT Frequency 2-3x/wk   Recommendation   OT Discharge Recommendation No rehabilitation needs   AM-PAC Daily Activity Inpatient   Lower Body Dressing 3   Bathing 4   Toileting 4   Upper Body Dressing 4   Grooming 4   Eating 4   Daily Activity Raw Score 23   Daily Activity Standardized Score (Calc for Raw Score >=11) 51 12   AM-PAC Applied Cognition Inpatient   Following a Speech/Presentation 4   Understanding Ordinary Conversation 4   Taking Medications 4   Remembering Where Things Are Placed or Put Away 4   Remembering List of 4-5 Errands 4   Taking Care of Complicated Tasks 4   Applied Cognition Raw Score 24   Applied Cognition Standardized Score 62 21   End of Consult   Education Provided Yes   Patient Position at End of Consult Bedside chair;Bed/Chair alarm activated; All needs within reach   Nurse Communication Nurse aware of consult     Dima Sterling OTR/FABIANO

## 2022-11-10 NOTE — PHYSICAL THERAPY NOTE
PHYSICAL THERAPY NOTE       11/10/22 0950   PT Last Visit   PT Visit Date 11/10/22   Note Type   Note Type Treatment   Pain Assessment   Pain Assessment Tool 0-10   Pain Score 6   Pain Location/Orientation Orientation: Right;Location: Hip   Hospital Pain Intervention(s) Medication (See MAR); Ambulation/increased activity;Repositioned   Restrictions/Precautions   Weight Bearing Precautions Per Order Yes   RLE Weight Bearing Per Order WBAT   Other Precautions Fall Risk;Pain   General   Chart Reviewed Yes   Additional Pertinent History Vital taken in supine, sitting, standing and after ambulation  All stable  Response to Previous Treatment Patient with no complaints from previous session     Family/Caregiver Present No   Cognition   Overall Cognitive Status WFL   Arousal/Participation Alert   Attention Within functional limits   Orientation Level Oriented X4   Memory Within functional limits   Following Commands Follows all commands and directions without difficulty   Subjective   Subjective "I hope to go home today "   Bed Mobility   Supine to Sit 5  Supervision   Additional items HOB elevated   Transfers   Sit to Stand 5  Supervision   Additional items Armrests   Stand to Sit 5  Supervision   Additional items Armrests   Additional Comments Verbal cues for hand placement   Ambulation/Elevation   Gait pattern Antalgic;Decreased R stance  (Initially step to pattern-progressed to step through with increased amb)   Gait Assistance 5  Supervision   Assistive Device Rolling walker   Distance 200ft   Stair Management Assistance 5  Supervision   Additional items Verbal cues  (demonstration provided)   Stair Management Technique Nonreciprocal;One rail R   Number of Stairs 3   Ambulation/Elevation Additional Comments Verbal cues and demonstration provided for RW sequencing and for stair technique   Balance   Static Sitting Normal   Dynamic Sitting Normal   Static Standing Good   Dynamic Standing Good   Ambulatory Fair +   Endurance Deficit   Endurance Deficit No   Activity Tolerance   Activity Tolerance Patient tolerated treatment well   Medical Staff Made Aware Co-treat with OTRenu   Nurse Made Aware RNSam   Assessment   Prognosis Good   Problem List Decreased strength;Decreased endurance; Impaired balance;Decreased mobility;Pain   Assessment Patient was received supine in bed  Has not been OOB since P T  session in PACU yesterday  Orthostatic BP taken-stable  Patient progressed this session and was able to perform bed mobility, transfers, ambulation and stairs at a supervision level  Educated patient on proper use of RW and proper stair technique  Patient will continue to benefit from ongoing inpatient P T  Recommending outpatient P T  at discharge  The patient's AM-PAC Basic Mobility Inpatient Short Form Raw Score is 20  A Raw score of greater than 17 suggests the patient may benefit from discharge to home  Please also refer to the recommendation of the Physical Therapist for safe discharge planning  Barriers to Discharge None   Goals   Patient Goals To go home today   STG Expiration Date 11/12/22   Plan   Treatment/Interventions Functional transfer training;Elevations; Therapeutic exercise;Equipment eval/education; Bed mobility;Gait training;Spoke to nursing;OT   Progress Progressing toward goals   PT Frequency Twice a day   Recommendation   PT Discharge Recommendation Home with outpatient rehabilitation   Additional Comments Owns RW   AM-PAC Basic Mobility Inpatient   Turning in Bed Without Bedrails 4   Lying on Back to Sitting on Edge of Flat Bed 4   Moving Bed to Chair 3   Standing Up From Chair 3   Walk in Room 3   Climb 3-5 Stairs 3   Basic Mobility Inpatient Raw Score 20   Basic Mobility Standardized Score 43 99   Highest Level Of Mobility   JH-HLM Goal 6: Walk 10 steps or more   JH-HLM Achieved 7: Walk 25 feet or more   Education   Education Provided Mobility training;Home exercise program;Assistive device   Patient Demonstrates acceptance/verbal understanding   End of Consult   Patient Position at End of Consult Bedside chair;Bed/Chair alarm activated; All needs within reach   Belen Lange            Patient Name: Zeenat Aguilera  SVAANA Date: 11/10/2022

## 2022-11-11 ENCOUNTER — TELEPHONE (OUTPATIENT)
Dept: OBGYN CLINIC | Facility: HOSPITAL | Age: 72
End: 2022-11-11

## 2022-11-14 ENCOUNTER — OFFICE VISIT (OUTPATIENT)
Dept: PHYSICAL THERAPY | Facility: CLINIC | Age: 72
End: 2022-11-14

## 2022-11-14 DIAGNOSIS — M16.11 PRIMARY OSTEOARTHRITIS OF ONE HIP, RIGHT: Primary | ICD-10-CM

## 2022-11-14 NOTE — PROGRESS NOTES
PT Re-Evaluation     Today's date: 2022  Patient name: Yash Medrano  : 1950  MRN: [id number]  Referring provider: Brenna Lange PA-C  Dx:   Encounter Diagnosis     ICD-10-CM    1  Primary osteoarthritis of one hip, right  M16 11                   Assessment  Assessment details: Samara Mendez is a 70 y o  male presenting with pain in posterior R hip and anterior R knee that is chronic  He thought that his primary issue was his knee pain, though imaging revealed severely reduced joint space in R hip  MD recommended BRANDEE and it has been scheduled for 22  Pain is the most severe with squatting tasks, especially toileting  Pain is equal for both ascending and descending phases of the movement  Pt can benefit from skilled therapy to improve pain, strength, and range of motion after BRANDEE procedure in order to improve function and quality of life  Re-assessment 22:  Samara Mendez has returned to OPPT S/P R BRANDEE (DOS: 22) that is consistent with referring diagnosis and moderately complex secondary to post op status and chronic pain  Clinically demonstrates limited R hip ROM, strength and proprioception as well as limited ability to perform ADLs as a result of increased falls risk and need for AD for ambulation  This suggests the need for continued skilled OPPT to address his remaining impairments, achieve established goals and return to PLOF pain-free     Impairments: abnormal or restricted ROM, abnormal movement, impaired physical strength, pain with function and weight-bearing intolerance    Symptom irritability: highUnderstanding of Dx/Px/POC: good   Prognosis: good    Goals  Short Term Goals (4 weeks)  1 ) Establish independence with HEP  2 ) Decrease subjective pain levels from NPRS at least to 2-5/10 at rest and with activity  3 ) Improve R hip ROM at least 5-10 degrees into all planes to allow for improved ease of movement with less guarding    Long Term Goals (8 weeks)  1 ) Improve R hip ROM to WNL in all planes to restore normal movement with ADLs and function  2 ) Improve R hip ABD and EXT strength to 5/5 in all planes in order to return to pain-free ADLs and function  3 ) Improve FOTO score at least to 75 points showing improved self reported disability     Plan  Patient would benefit from: skilled physical therapy  Planned modality interventions: cryotherapy, TENS, thermotherapy: hydrocollator packs and biofeedback  Planned therapy interventions: balance, massage, manual therapy, self care, stretching, therapeutic activities, therapeutic exercise, flexibility, gait training, graded activity, graded exercise, home exercise program and behavior modification  Frequency: 2x week  Duration in visits: 16  Duration in weeks: 8  Plan of Care beginning date: 2022  Plan of Care expiration date: 2023  Treatment plan discussed with: patient        Subjective Evaluation    History of Present Illness  Date of onset: 2022  Date of surgery: 2022  Mechanism of injury: surgery  Mechanism of injury: Francisca Chery is a 70 y o  male who presents with increased R hip pain S/P R BRANDEE (DOS: 22) starting ~ a few months ago with AILEEN  Stayed at [facility] for 1 night until D/C  Did not receive any home PT or nursing  Currently using a RW for ambulation  Still having surgical dressing  Denies any fever  Slight nausea reports it could be related to pain medication  Notes disturbed sleep due to increased frequency in urination  Denies sharp night pain or changes in bowel or bladder function  Reports R lateral thigh burning NT signs or sxs  F/U with MD in 22  Wishes to return to PLOF pain-free  Wishes to reduce burning pain, improve mobility and sleep in his bed              Recurrent probem    Quality of life: good    Pain  Current pain ratin  At best pain ratin  At worst pain ratin  Location: R hip   Quality: dull ache, burning and sharp  Relieving factors: change in position  Aggravating factors: stair climbing, walking, standing and sitting    Social Support  Steps to enter house: yes  2  Stairs in house: yes   12  Lives in: multiple-level home  Lives with: significant other    Employment status: working    Diagnostic Tests  X-ray: abnormal  Treatments  Previous treatment: physical therapy  Current treatment: physical therapy  Patient Goals  Patient goals for therapy: increased strength, decreased pain, decreased edema, improved balance and increased motion          Objective     Active Range of Motion   Left Hip   Flexion: 118 degrees   Abduction: 45 degrees   External rotation (90/90): 43 degrees   Internal rotation (90/90): 35 degrees     Right Hip   Flexion: 91 degrees   Abduction: 15 degrees   External rotation (90/90): 32 degrees   Internal rotation (90/90): 21 degrees     Strength/Myotome Testing     Left Hip   Planes of Motion   Flexion: 5  Extension: 4  Adduction: 5  External rotation: 4+  Internal rotation: 5    Right Hip   Planes of Motion   Flexion: 4-  Extension: 4  Adduction: 5  External rotation: 4-  Internal rotation: 4    Left Knee   Flexion: 5  Extension: 5    Right Knee   Flexion: 5  Extension: 5    Swelling     Left Knee Girth Measurement (cm)   Joint line: 41 cm  10 cm above joint line: 51 cm  10 cm below joint line: 38 cm    Right Knee Girth Measurement (cm)   Joint line: 42 cm  10 cm above joint line: 48 cm  10 cm below joint line: 39 cm    Functional Assessment      Squat    Left within functional limits and right within functional limits  Single Leg Stance   Left: 2 seconds  Right: 2 seconds             Precautions:  Hip; CAD; HTN, DM2; Polyneuropathy; Stage 3 kidney disease  EPOC: 1/16/22  HEP: Access Code: [email or web address]  URL: [email or web address]  Date: 11/14/2022  Prepared by: Priti Hickman    Exercises  · Standing March with Counter Support - 1 x daily - 7 x weekly - 3 sets - 10 reps  · Standing Hip Abduction - 1 x daily - 7 x weekly - 3 sets - 10 reps  · Standing Hip Extension with Chair - 1 x daily - 7 x weekly - 3 sets - 10 reps  · Standing Hip Flexion AROM - 1 x daily - 7 x weekly - 3 sets - 10 reps  · Sit to Stand - 1 x daily - 7 x weekly - 3 sets - 10 reps          Manuals 11/14            R hip PROM                                                    Neuro Re-Ed             Tandem             SLS             STD marching 10x                                                                Ther Ex             STD hip 3 way             Bridges 10x            Iso ADD/ABD             Clamshells SL                                                                 Ther Activity             TM walk                          Gait Training                                       Modalities

## 2022-11-14 NOTE — TELEPHONE ENCOUNTER
Pt returned my call today, a full postoperative follow up call assessment was completed  He reports "burning" pain in his thigh, that is an 8-9/10 pain  He reports ambulating with the RW, and having swelling to the thigh also  We discussed icing areas of pain and swelling, and he has outpatient PT today  He does report his dressing is dry with no drainage  We reviewed his AVS medication list  He reports taking oxycodone 5mg every 4 hours and tylenol 650mg TID for pain  He is taking the senna daily, and reports having had a BM  He is also taking the ASA 81mg BID  He is NOT taking the zofran and he reports "not needed "     He denies chest pain, SOB, dizziness, fever or calf pain  He denies questions or issues, but is aware I will also notify the surgeon of his 8-9/10 pain with tylenol and oxycodone  pt encouraged to call me with questions, concerns or issues

## 2022-11-18 ENCOUNTER — APPOINTMENT (OUTPATIENT)
Dept: RADIOLOGY | Facility: CLINIC | Age: 72
End: 2022-11-18

## 2022-11-18 ENCOUNTER — OFFICE VISIT (OUTPATIENT)
Dept: OBGYN CLINIC | Facility: CLINIC | Age: 72
End: 2022-11-18

## 2022-11-18 ENCOUNTER — APPOINTMENT (OUTPATIENT)
Dept: PHYSICAL THERAPY | Facility: CLINIC | Age: 72
End: 2022-11-18

## 2022-11-18 VITALS
DIASTOLIC BLOOD PRESSURE: 80 MMHG | BODY MASS INDEX: 29.2 KG/M2 | SYSTOLIC BLOOD PRESSURE: 130 MMHG | HEIGHT: 70 IN | WEIGHT: 204 LBS

## 2022-11-18 DIAGNOSIS — Z96.641 AFTERCARE FOLLOWING RIGHT HIP JOINT REPLACEMENT SURGERY: ICD-10-CM

## 2022-11-18 DIAGNOSIS — R53.83 OTHER FATIGUE: ICD-10-CM

## 2022-11-18 DIAGNOSIS — Z96.641 AFTERCARE FOLLOWING RIGHT HIP JOINT REPLACEMENT SURGERY: Primary | ICD-10-CM

## 2022-11-18 DIAGNOSIS — Z47.1 AFTERCARE FOLLOWING RIGHT HIP JOINT REPLACEMENT SURGERY: Primary | ICD-10-CM

## 2022-11-18 DIAGNOSIS — Z47.1 AFTERCARE FOLLOWING RIGHT HIP JOINT REPLACEMENT SURGERY: ICD-10-CM

## 2022-11-18 NOTE — PROGRESS NOTES
Subject:Patient seen and examined  Pain controlled  Progressing well  He is ambulating with his rolling walker  Incisions without drainage  Denies fevers or chills  His main complaint is that he has no energy  He has a history B12 deficiency  He is also having trouble with urinating with increased urgency  He denies chest pain  He has not had any recent readings for his glucose since surgery, his Dexcom was replaced recently and he will get a reading later today  Physical Exam:  Incision:  Prineo dressing intact  Incision is CDI, no erythema or drainage noted  ROM: normal post op without pain   -log roll  5/5 IP/Q/HS/TA/GS, 2+ DP/PT, SILT DP/SP/S/S/TN    XR: Right BRANDEE prosthesis in good position with no signs of loosening or fracture  Assessment/Plan:  71 y/o male 9 days s/p Right anterior BRANDEE from 11/9/22      - continue multi-modal pain control   - Weight bearing status: WBAT RLE  - DVT ppx: ASA 81mg bid x 4 weeks  - Incision care: Prineo dressing intact, may shower   - PT/OT  - Will order labs to eval for cause of his fatigue and have him follow up with his PCP  Contacted his PCP Marito Ramirez do discuss his symptoms and she will see him early next week   - May drive once he is 4 weeks post op, off narcotics and he feels safe to drive    - will f/u labs   - F/U 3 weeks      Scribe Attestation    I,:  Genoveva Wilks PA-C am acting as a scribe while in the presence of the attending physician :       I,:  Mary Hdez DO personally performed the services described in this documentation    as scribed in my presence :

## 2022-11-22 ENCOUNTER — APPOINTMENT (OUTPATIENT)
Dept: PHYSICAL THERAPY | Facility: CLINIC | Age: 72
End: 2022-11-22

## 2022-11-25 ENCOUNTER — DOCUMENTATION (OUTPATIENT)
Dept: ENDOCRINOLOGY | Facility: HOSPITAL | Age: 72
End: 2022-11-25

## 2022-11-25 ENCOUNTER — APPOINTMENT (OUTPATIENT)
Dept: PHYSICAL THERAPY | Facility: CLINIC | Age: 72
End: 2022-11-25

## 2022-11-27 DIAGNOSIS — Z79.4 TYPE 2 DIABETES MELLITUS WITH HYPERGLYCEMIA, WITH LONG-TERM CURRENT USE OF INSULIN (HCC): ICD-10-CM

## 2022-11-27 DIAGNOSIS — E11.65 TYPE 2 DIABETES MELLITUS WITH HYPERGLYCEMIA, WITH LONG-TERM CURRENT USE OF INSULIN (HCC): ICD-10-CM

## 2022-11-28 RX ORDER — INSULIN ASPART 100 [IU]/ML
INJECTION, SOLUTION INTRAVENOUS; SUBCUTANEOUS
Qty: 30 ML | Refills: 1 | Status: SHIPPED | OUTPATIENT
Start: 2022-11-28

## 2022-12-02 ENCOUNTER — PROCEDURE VISIT (OUTPATIENT)
Dept: OBGYN CLINIC | Facility: CLINIC | Age: 72
End: 2022-12-02

## 2022-12-02 VITALS
BODY MASS INDEX: 29.2 KG/M2 | SYSTOLIC BLOOD PRESSURE: 134 MMHG | WEIGHT: 204 LBS | DIASTOLIC BLOOD PRESSURE: 78 MMHG | HEIGHT: 70 IN

## 2022-12-02 DIAGNOSIS — M17.11 PRIMARY OSTEOARTHRITIS OF RIGHT KNEE: Primary | ICD-10-CM

## 2022-12-02 DIAGNOSIS — M25.461 EFFUSION OF RIGHT KNEE: ICD-10-CM

## 2022-12-02 NOTE — PROGRESS NOTES
Subject: Patient seen and examined  Pain controlled  Progressing well  He is ambulating with a cane when out of the house  Incisions without drainage  Denies fevers or chills  He has improvement in his fatigue and energy  He states that his Dexcom is now providing readings and working well  Physical Exam:  Incision: Incision is CDI, mild erythema at the proximal aspect of the incision  No drainage noted  ROM: normal post op without pain   -log roll  5/5 IP/Q/HS/TA/GS, 2+ DP/PT, SILT DP/SP/S/S/TN    XR: Right BRANDEE prosthesis in good position with no signs of loosening or fracture  Assessment/Plan:  71 y/o male 3 5 weeks s/p Right anterior BRANDEE from 11/9/22      - continue multi-modal pain control   - Weight bearing status: WBAT RLE  - DVT ppx: ASA 81mg for 4 weeks post op    - Incision care: may shower, avoid submerging   - PT/OT  - May drive once he is 4 weeks post op, off narcotics and he feels safe to drive   - F/U 6 weeks      Scribe Attestation    I,:  Gordon Cooper PA-C am acting as a scribe while in the presence of the attending physician :       I,:  Lai Espinal DO personally performed the services described in this documentation    as scribed in my presence :

## 2022-12-02 NOTE — PROGRESS NOTES
Large joint arthrocentesis: R knee  Universal Protocol:  Consent: Verbal consent obtained  Risks and benefits: risks, benefits and alternatives were discussed  Consent given by: patient  Patient understanding: patient states understanding of the procedure being performed    Supporting Documentation  Indications: pain   Procedure Details  Location: knee - R knee  Preparation: Patient was prepped and draped in the usual sterile fashion  Needle size: 18 G  Approach: lateral  Medications administered: 88 mg hyaluronan 88 MG/4ML    Aspirate amount: 15 mL  Aspirate: clear, serous and yellow    Patient tolerance: patient tolerated the procedure well with no immediate complications  Dressing:  Sterile dressing applied        Right knee Monovisc injection performed today      Scribe Attestation    I,:  Oza Lanes, PA-C am acting as a scribe while in the presence of the attending physician :       I,:  Jackie Ponce DO personally performed the services described in this documentation    as scribed in my presence :

## 2022-12-07 DIAGNOSIS — E11.65 TYPE 2 DIABETES MELLITUS WITH HYPERGLYCEMIA, WITH LONG-TERM CURRENT USE OF INSULIN (HCC): ICD-10-CM

## 2022-12-07 DIAGNOSIS — Z79.4 TYPE 2 DIABETES MELLITUS WITH HYPERGLYCEMIA, WITH LONG-TERM CURRENT USE OF INSULIN (HCC): ICD-10-CM

## 2022-12-08 ENCOUNTER — OFFICE VISIT (OUTPATIENT)
Dept: ENDOCRINOLOGY | Facility: HOSPITAL | Age: 72
End: 2022-12-08

## 2022-12-08 VITALS
HEIGHT: 70 IN | SYSTOLIC BLOOD PRESSURE: 102 MMHG | BODY MASS INDEX: 28.49 KG/M2 | WEIGHT: 199 LBS | DIASTOLIC BLOOD PRESSURE: 60 MMHG | HEART RATE: 86 BPM

## 2022-12-08 DIAGNOSIS — E11.42 DIABETIC POLYNEUROPATHY ASSOCIATED WITH TYPE 2 DIABETES MELLITUS (HCC): ICD-10-CM

## 2022-12-08 DIAGNOSIS — N18.30 STAGE 3 CHRONIC KIDNEY DISEASE, UNSPECIFIED WHETHER STAGE 3A OR 3B CKD (HCC): ICD-10-CM

## 2022-12-08 DIAGNOSIS — Z79.4 TYPE 2 DIABETES MELLITUS WITH HYPERGLYCEMIA, WITH LONG-TERM CURRENT USE OF INSULIN (HCC): Primary | ICD-10-CM

## 2022-12-08 DIAGNOSIS — E78.2 HYPERCHOLESTEROLEMIA WITH HYPERTRIGLYCERIDEMIA: ICD-10-CM

## 2022-12-08 DIAGNOSIS — E11.65 TYPE 2 DIABETES MELLITUS WITH HYPERGLYCEMIA, WITH LONG-TERM CURRENT USE OF INSULIN (HCC): Primary | ICD-10-CM

## 2022-12-08 DIAGNOSIS — I10 PRIMARY HYPERTENSION: ICD-10-CM

## 2022-12-08 RX ORDER — HYDROCODONE BITARTRATE AND ACETAMINOPHEN 5; 325 MG/1; MG/1
TABLET ORAL
COMMUNITY
Start: 2022-12-02

## 2022-12-08 RX ORDER — INSULIN GLARGINE 100 [IU]/ML
30 INJECTION, SOLUTION SUBCUTANEOUS
Qty: 15 ML | Refills: 2 | Status: SHIPPED | OUTPATIENT
Start: 2022-12-08

## 2022-12-08 NOTE — PATIENT INSTRUCTIONS
Continue same dose of Lantus  Continue Novolog with breakfast and lunch at 14 units  Decrease Novolog at dinner to 12 units with sliding scale  Continue with Dexcom  Call with any concerns or questions  Follow up in 3 months with lab work

## 2022-12-08 NOTE — PROGRESS NOTES
Melody Vu 70 y o  male MRN: [id number]    Encounter: [id number]      Assessment/Plan     Assessment: This is a 70y o -year-old male with type 2 diabetes with neuropathy, hypertension and hyperlipidemia  Plan:  1  Type 2 diabetes, insulin requiring:  Most recent hemoglobin A1c is 7 2  Review of his Dexcom he relatively controlled with some hypoglycemia following dinner  Now he will continue NovoLog 14 units with breakfast and lunch and decrease his NovoLog to 12 units at dinner with sliding scale  Continue to utilize Dexcom to monitor glucose levels  Contact the office with any concerns or questions  Follow-up in 3 months with lab work completed prior to visit      2  Diabetic neuropathy: Diabetic foot exam performed in the office today reveals diminished sensation  Adjusted that he follow-up with podiatry for regular diabetic foot care      3  Hypertension:  Normotensive in the office  Kidney function remains stable normal electrolytes  Continue with current blood pressure medications      4  Hyperlipidemia: Continue current regimen  Check fasting lipid panel prior to next visit  CC: Type II diabetes follow-up    History of Present Illness     HPI:  72 year old male with type 2 diabetes, insulin requiring for 20 years with hypertension and hyperlipidemia of hypertriglyceridemia for follow up   He was diagnosed with diabetes about 20 years ago  He is on insulin at home and takes Lantus insulin 22 units at bedtime and NovoLog insulin   Most recent hemoglobin A1c from October 25, 2022 was 7 2  He is status post right hip replacement from November 8, 2022    He denies any polyuria, polydipsia, polyphagia, and blurry vision   He has once or twice a night nocturia   He has a bit of numbness of the feet and will feel unsteady at times and has fallen multiple times  [facility] denies chest pain or shortness of breath   He denies nephropathy, retinopathy, heart attack, stroke and claudication but does admit to neuropathy, coronary artery disease and TIAs  Yelena Harrison has followed up with medical nutrition therapy recently  He was admitted to the hospital May 15 through May 18, 2022 for right-sided facial droop  Initial concern was a TIA, but was diagnosed with Bell palsy  Was placed on course of steroids, and states that symptoms lasted for about 2 5 months  He is 2 weeks post 8 right sided total hip replacement           Hypoglycemic episodes: After dinner at times recently    H/o of hypoglycemia causing hospitalization or intervention such as glucagon injection  or ambulance call  No   Hypoglycemia symptoms: jitteriness, sweating and lightheaded  Treatment of hypoglycemia: glucose packets  Glucagon:No   Medic alert tag: recommended,Yes       The patient's last eye exam was in November 2021 with no retinopathy   The patient's last foot exam was performed at his office visit on December 2, 2021       Blood Sugar/Glucometer/Pump/CGM review:  Download of Dexcom from November 25 through December 8 , 2022 reveals an average glucose level of 159 with the standard deviation of 45  He is in target range 67% time, below target range 1% time, above target range 31% time  There is less than 2% low blood sugars      He has hypertension and takes losartan 50 mg daily and Bystolic 20 mg daily  Maryam Meline denies headache or stroke-like symptoms      He has hyperlipidemia with significant hypertriglyceridemia as high as 2400 and takes simvastatin 20 mg daily, Zetia 10 mg daily, and Vascepa 1 g - 4 times a day   He denies chest pain or shortness of breath  Review of Systems   Constitutional: Negative  Negative for chills, fatigue and fever  HENT: Negative  Negative for trouble swallowing and voice change  Eyes: Negative for photophobia, pain, discharge, redness, itching and visual disturbance  Respiratory: Negative for cough and shortness of breath  Cardiovascular: Negative for chest pain and palpitations     Gastrointestinal: Negative for abdominal pain, constipation, diarrhea, nausea and vomiting  Endocrine: Negative for cold intolerance, heat intolerance, polydipsia, polyphagia and polyuria  Genitourinary: Negative  Musculoskeletal: Negative  Skin: Negative  Allergic/Immunologic: Negative  Neurological: Negative for dizziness, syncope, light-headedness and headaches  Hematological: Negative  Psychiatric/Behavioral: Negative  All other systems reviewed and are negative  Historical Information   Past Medical History:   Diagnosis Date   • Arthritis    • Cervical disc disease     C2-7, needs repair   • Coronary artery disease     angina is pain in throat   • 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   • Hypertension    • TIA (transient ischemic attack)     X 2     Past Surgical History:   Procedure Laterality Date   • ANKLE ARTHROSCOPY Bilateral    • APPENDECTOMY     • CATARACT EXTRACTION, BILATERAL Bilateral    • CHOLECYSTECTOMY     • CORONARY ANGIOPLASTY WITH STENT PLACEMENT      2 stents in LAD   • ELBOW ARTHROSCOPY Bilateral     with right elbow tear repaired   • ERCP     • KNEE ARTHROSCOPY Right    • LUMBAR LAMINECTOMY      5 times   • MULTIPLE TOOTH EXTRACTIONS     • NOSE SURGERY  2021    fracture post a fall   • NM TOTAL HIP ARTHROPLASTY Right 11/9/2022    Procedure: ARTHROPLASTY HIP TOTAL ANTERIOR,NAVIGATED- SAME DAY;   Surgeon: Benigno Mcguire DO;  Location:  MAIN OR;  Service: Orthopedics   • REPLACEMENT TOTAL KNEE Left    • SINUS SURGERY     • THUMB FUSION Bilateral     thumb repair with bone grafts   • TONSILLECTOMY AND ADENOIDECTOMY       Social History   Social History     Substance and Sexual Activity   Alcohol Use Yes    Comment: occasionally a glass of wine once a month      Social History     Substance and Sexual Activity   Drug Use Not Currently     Social History     Tobacco Use   Smoking Status Former   Smokeless Tobacco Never     Family History:   Family History   Problem Relation Age of Onset   • Uterine cancer Mother    • Colon cancer Father    • Cancer Sister         corneal   • No Known Problems Brother        Meds/Allergies   Current Outpatient Medications   Medication Sig Dispense Refill   • acetaminophen (TYLENOL) 650 mg CR tablet Take 1 tablet (650 mg total) by mouth every 8 (eight) hours as needed for mild pain 30 tablet 0   • allopurinol (ZYLOPRIM) 100 mg tablet Take 100 mg by mouth daily     • allopurinol (ZYLOPRIM) 300 mg tablet Take 300 mg by mouth daily     • amitriptyline (ELAVIL) 50 mg tablet Take 50 mg by mouth daily at bedtime       • ascorbic acid (VITAMIN C) 500 MG tablet Take 1 tablet (500 mg total) by mouth 2 (two) times a day 30 tablet 3   • aspirin (ECOTRIN LOW STRENGTH) 81 mg EC tablet Take 1 tablet (81 mg total) by mouth 2 (two) times a day 60 tablet 0   • atorvastatin (LIPITOR) 40 mg tablet Take 40 mg by mouth daily     • Blood Glucose Monitoring Suppl (Accu-Chek Guide Me) w/Device KIT Check blood sugars 3 times daily 1 kit 0   • cholecalciferol (VITAMIN D3) 1,000 units tablet Take 2 tablets (2,000 Units total) by mouth daily 30 tablet 0   • ezetimibe (ZETIA) 10 mg tablet Take 10 mg by mouth daily     • folic acid (FOLVITE) 1 mg tablet Take 1 tablet (1 mg total) by mouth daily 30 tablet 3   • HYDROcodone-acetaminophen (NORCO) 5-325 mg per tablet      • insulin aspart (NovoLOG FlexPen) 100 UNIT/ML injection pen INJECT 10 UNITS SUBCUTANEOUSLY 3 TIMES DAILY WITH MEALS (Patient taking differently: INJECT 14 UNITS SUBCUTANEOUSLY 3 TIMES DAILY WITH MEALS) 30 mL 1   • Klor-Con M20 20 MEQ tablet Take 20 mEq by mouth daily       • Lantus SoloStar 100 units/mL SOPN INJECT 30 UNITS UNDER THE SKIN DAILY AT BEDTIME 15 mL 2   • Magnesium 400 MG TABS Take by mouth daily       • Multiple Vitamins-Minerals (Multivitamin Men 50+) TABS Take by mouth daily     • nebivolol (BYSTOLIC) 2 5 mg tablet EVERY NIGHT TAKE 1 TABLET BY MOUTH EVERY DAY     • NIFEdipine (PROCARDIA XL) 30 mg 24 hr tablet Take 60 mg by mouth every evening     • ondansetron (Zofran ODT) 4 mg disintegrating tablet Take 1 tablet (4 mg total) by mouth every 6 (six) hours as needed for nausea or vomiting 20 tablet 0   • senna-docusate sodium (SENOKOT S) 8 6-50 mg per tablet Take 1 tablet by mouth daily 30 tablet 0     No current facility-administered medications for this visit  Allergies   Allergen Reactions   • Mushroom Extract Complex - Food Allergy Anaphylaxis   • Nitroglycerin Vomiting and Headache       Objective   Vitals: Blood pressure 102/60, pulse 86, height 5' 10" (1 778 m), weight 90 3 kg (199 lb)  Physical Exam  Vitals reviewed  Constitutional:       Appearance: He is well-developed  HENT:      Head: Normocephalic and atraumatic  Nose: Nose normal    Eyes:      Conjunctiva/sclera: Conjunctivae normal       Pupils: Pupils are equal, round, and reactive to light  Comments: Wears glasses   Cardiovascular:      Rate and Rhythm: Normal rate and regular rhythm  Heart sounds: Normal heart sounds  Pulmonary:      Effort: Pulmonary effort is normal       Breath sounds: Normal breath sounds  Abdominal:      General: Bowel sounds are normal       Palpations: Abdomen is soft  Musculoskeletal:         General: Normal range of motion  Cervical back: Normal range of motion and neck supple  Skin:     General: Skin is warm and dry  Neurological:      Mental Status: He is alert and oriented to person, place, and time  Psychiatric:         Behavior: Behavior normal          Thought Content:  Thought content normal          Judgment: Judgment normal        Lab Results:   Lab Results   Component Value Date/Time    Hemoglobin A1C 7 5 07/29/2022 12:00 AM    Hemoglobin A1C 11 6 12/09/2021 12:00 AM    WBC 11 59 (H) 11/10/2022 02:23 AM    WBC 12 15 (H) 11/09/2022 11:30 AM    WBC 7 99 11/09/2022 06:42 AM    Hemoglobin 9 4 (L) 11/10/2022 02:23 AM    Hemoglobin 10 6 (L) 11/09/2022 11:30 AM Hemoglobin 13 2 11/09/2022 06:42 AM    Hematocrit 27 9 (L) 11/10/2022 02:23 AM    Hematocrit 31 7 (L) 11/09/2022 11:30 AM    Hematocrit 39 0 11/09/2022 06:42 AM    MCV 95 11/10/2022 02:23 AM    MCV 94 11/09/2022 11:30 AM    MCV 94 11/09/2022 06:42 AM    Platelets 608 53/40/3274 02:23 AM    Platelets 428 49/09/7577 11:30 AM    Platelets 049 54/77/9197 06:42 AM    BUN 31 (H) 11/10/2022 02:23 AM    BUN 27 (H) 11/09/2022 11:30 AM    BUN 25 11/09/2022 06:42 AM    Potassium 4 2 11/10/2022 02:23 AM    Potassium 4 5 11/09/2022 11:30 AM    Potassium 3 9 11/09/2022 06:42 AM    Chloride 100 11/10/2022 02:23 AM    Chloride 103 11/09/2022 11:30 AM    Chloride 102 11/09/2022 06:42 AM    CO2 28 11/10/2022 02:23 AM    CO2 25 11/09/2022 11:30 AM    CO2 25 11/09/2022 06:42 AM    Creatinine 1 99 (H) 11/10/2022 02:23 AM    Creatinine 1 95 (H) 11/09/2022 11:30 AM    Creatinine 1 63 (H) 11/09/2022 06:42 AM     (H) 11/09/2022 11:30 AM    AST 45 11/09/2022 06:42 AM     (H) 11/09/2022 11:30 AM     (H) 11/09/2022 06:42 AM    Albumin 3 7 11/09/2022 11:30 AM    Albumin 4 1 11/09/2022 06:42 AM     Portions of the record may have been created with voice recognition software  Occasional wrong word or "sound a like" substitutions may have occurred due to the inherent limitations of voice recognition software  Read the chart carefully and recognize, using context, where substitutions have occurred

## 2023-01-03 ENCOUNTER — EVALUATION (OUTPATIENT)
Dept: PHYSICAL THERAPY | Facility: CLINIC | Age: 73
End: 2023-01-03

## 2023-01-03 DIAGNOSIS — M25.551 RIGHT HIP PAIN: ICD-10-CM

## 2023-01-03 DIAGNOSIS — M16.11 PRIMARY OSTEOARTHRITIS OF ONE HIP, RIGHT: Primary | ICD-10-CM

## 2023-01-03 NOTE — PROGRESS NOTES
PT Re-Evaluation    D/C secondary to failure to follow up     Today's date: 1/3/2023  Patient name: Tc Teran  : 1950  MRN: [id number]  Referring provider: Valery Meyers PA-C  Dx:   Encounter Diagnosis     ICD-10-CM    1  Primary osteoarthritis of one hip, right  M16 11       2  Right hip pain  M25 551                      Assessment  Assessment details: Gatito Lo is a 70 y o  male presenting with pain in posterior R hip and anterior R knee that is chronic  He thought that his primary issue was his knee pain, though imaging revealed severely reduced joint space in R hip  MD recommended BRANDEE and it has been scheduled for 22  Pain is the most severe with squatting tasks, especially toileting  Pain is equal for both ascending and descending phases of the movement  Pt can benefit from skilled therapy to improve pain, strength, and range of motion after BRANDEE procedure in order to improve function and quality of life  Re-assessment 22:  Gatito Lo has returned to OPPT S/P R BRANDEE (DOS: 22) that is consistent with referring diagnosis and moderately complex secondary to post op status and chronic pain  Clinically demonstrates limited R hip ROM, strength and proprioception as well as limited ability to perform ADLs as a result of increased falls risk and need for AD for ambulation  This suggests the need for continued skilled OPPT to address his remaining impairments, achieve established goals and return to PLOF pain-free  Re-assessment 1/3/23: Gatito Lo returns to OPPT and reports a 50% GROC  Clinically demonstrates improved R hip ROM and strength at R hip but still limited with ABD and flexion as well as balance functionally  He continues to have R hip flexor weakness and tightness with noted harjeet test restriction and pain with R hip flexor leading to difficulty with ADLs and ambulation    This suggests the need for continued skilled OPPT to address his remaining impairments, achieve established goals and return to PLOF pain-free  Impairments: abnormal or restricted ROM, abnormal movement, impaired physical strength, pain with function and weight-bearing intolerance    Symptom irritability: highUnderstanding of Dx/Px/POC: good   Prognosis: good    Goals  Short Term Goals (4 weeks)  1 ) Establish independence with HEP- not met  2 ) Decrease subjective pain levels from NPRS at least to 2-5/10 at rest and with activity- MET  3 ) Improve R hip ROM at least 5-10 degrees into all planes to allow for improved ease of movement with less guarding- mET    Long Term Goals (8 weeks)  1 ) Improve R hip ROM to WNL in all planes to restore normal movement with ADLs and function- Not met  2 ) Improve R hip ABD and EXT strength to 5/5 in all planes in order to return to pain-free ADLs and function- not met  3 ) Improve FOTO score at least to 75 points showing improved self reported disability - partially met    Plan  Plan details: Continue strength for R hip ROM, and stability; reduce anterior R hip restriction; monitor sxs and progress as able     Patient would benefit from: skilled physical therapy  Planned modality interventions: cryotherapy, TENS, thermotherapy: hydrocollator packs and biofeedback  Planned therapy interventions: balance, massage, manual therapy, self care, stretching, therapeutic activities, therapeutic exercise, flexibility, gait training, graded activity, graded exercise, home exercise program and behavior modification  Frequency: 2x week  Duration in visits: 16  Duration in weeks: 8  Plan of Care beginning date: 1/3/2023  Plan of Care expiration date: 3/6/2023  Treatment plan discussed with: patient        Subjective Evaluation    History of Present Illness  Date of onset: 7/14/2022  Date of surgery: 11/9/2022  Mechanism of injury: surgery  Mechanism of injury: Ashanti Soliz is a 70 y o  male who presents with increased R hip pain S/P R BRANDEE (DOS: 11/9/22) starting ~ a few months ago with AILEEN  Stayed at [facility] for 1 night until D/C  Did not receive any home PT or nursing  Currently using a RW for ambulation  Still having surgical dressing  Denies any fever  Slight nausea reports it could be related to pain medication  Notes disturbed sleep due to increased frequency in urination  Denies sharp night pain or changes in bowel or bladder function  Reports R lateral thigh burning NT signs or sxs  F/U with MD in 22  Wishes to return to PLOF pain-free  Wishes to reduce burning pain, improve mobility and sleep in his bed  Re-assessment 1/3/23: Dorena Apgar returns to OPPT and reports a 50% GROC  Returns and reports he was anemic and had major fatigue  Notes he took a few months and focused on walking without any home PT  Returns to PT and wishes to work on walking more (no longer using a SPC )  Still having difficulty with stairs and getting out of chairs due to continued R quad and hip weakness  F/U with MD this month in 3 weeks   Wishes to return to golf              Recurrent probem    Quality of life: good    Pain  Current pain ratin  At best pain ratin  At worst pain ratin  Location: R hip   Quality: dull ache, burning and sharp  Relieving factors: change in position  Aggravating factors: stair climbing, walking, standing and sitting    Social Support  Steps to enter house: yes  2  Stairs in house: yes   12  Lives in: multiple-level home  Lives with: significant other    Employment status: working    Diagnostic Tests  X-ray: abnormal  Treatments  Previous treatment: physical therapy  Current treatment: physical therapy  Patient Goals  Patient goals for therapy: increased strength, decreased pain, decreased edema, improved balance and increased motion          Objective     Active Range of Motion   Left Hip   Flexion: 118 degrees   Abduction: 45 degrees   External rotation (90/90): 43 degrees   Internal rotation (90/90): 35 degrees     Right Hip   Flexion: 91 degrees   Abduction: 28 degrees   External rotation (90/90): 40 degrees   Internal rotation (90/90): 30 degrees     Strength/Myotome Testing     Left Hip   Planes of Motion   Flexion: 5  Extension: 4  Adduction: 5  External rotation: 4+  Internal rotation: 5    Right Hip   Planes of Motion   Flexion: 4  Extension: 4  Adduction: 5  External rotation: 4  Internal rotation: 4+    Left Knee   Flexion: 5  Extension: 5    Right Knee   Flexion: 5  Extension: 5    Swelling     Left Knee Girth Measurement (cm)   Joint line: 41 cm  10 cm above joint line: 51 cm  10 cm below joint line: 38 cm    Right Knee Girth Measurement (cm)   Joint line: 42 cm  10 cm above joint line: 48 cm  10 cm below joint line: 39 cm    Functional Assessment      Squat    Left within functional limits and right within functional limits  Single Leg Stance   Left: 6 seconds  Right: 3 seconds             Precautions:  Hip; CAD; HTN, DM2; Polyneuropathy; Stage 3 kidney disease  EPOC: 1/16/22  HEP: Access Code: [email or web address]  URL: [email or web address]/  Date: 11/14/2022  Prepared by: Lola Crowder    Exercises  · Standing March with Counter Support - 1 x daily - 7 x weekly - 3 sets - 10 reps  · Standing Hip Abduction - 1 x daily - 7 x weekly - 3 sets - 10 reps  · Standing Hip Extension with Chair - 1 x daily - 7 x weekly - 3 sets - 10 reps  · Standing Hip Flexion AROM - 1 x daily - 7 x weekly - 3 sets - 10 reps  · Sit to Stand - 1 x daily - 7 x weekly - 3 sets - 10 reps          Manuals 11/14 1/3           R hip PROM  PF                                                  Neuro Re-Ed             Tandem             SLS  3x30"           STD marching 10x 10x                                                               Ther Ex             STD hip 3 way  10x ea           Bridges 10x            Iso ADD/ABD             Clamshells SL             Hip ABD and SLR  10x ea           Mini squat  2x10           Sit to stand  2x10           Split lunge  10x ea Leg press  155 lbs 1 RM R vs 165 lbs L           Ther Activity             TM walk                          Gait Training                                       Modalities

## 2023-01-05 ENCOUNTER — APPOINTMENT (OUTPATIENT)
Dept: PHYSICAL THERAPY | Facility: CLINIC | Age: 73
End: 2023-01-05

## 2023-01-10 ENCOUNTER — OFFICE VISIT (OUTPATIENT)
Dept: PHYSICAL THERAPY | Facility: CLINIC | Age: 73
End: 2023-01-10

## 2023-01-10 DIAGNOSIS — M25.551 RIGHT HIP PAIN: Primary | ICD-10-CM

## 2023-01-10 DIAGNOSIS — M16.11 PRIMARY OSTEOARTHRITIS OF ONE HIP, RIGHT: ICD-10-CM

## 2023-01-10 NOTE — PROGRESS NOTES
Daily Note     Today's date: 1/10/2023  Patient name: Marni Knowles  : 1950  MRN: [id number]  Referring provider: Filiberto Valentine PA-C  Dx:   Encounter Diagnosis     ICD-10-CM    1  Right hip pain  M25 551       2  Primary osteoarthritis of one hip, right  M16 11                      Subjective: notes feeling okay, continues to have anterior hip restriction but less recently  Objective: See treatment diary below      Assessment: Continues to have pain and discomfort with performance of hip flex but less since prior session  Able to progress with SL leg press up to 125 lbs SL  Still having instability with performance of step lunge and SL step up  Will continue with strength as able  Plan: Continue per plan of care  Precautions:  Hip; CAD; HTN, DM2; Polyneuropathy; Stage 3 kidney disease  EPOC: 22  HEP: Access Code: [email or web address]  URL: [email or web address]  Date: 2022  Prepared by: Karen [place]    Exercises  · Standing March with Counter Support - 1 x daily - 7 x weekly - 3 sets - 10 reps  · Standing Hip Abduction - 1 x daily - 7 x weekly - 3 sets - 10 reps  · Standing Hip Extension with Chair - 1 x daily - 7 x weekly - 3 sets - 10 reps  · Standing Hip Flexion AROM - 1 x daily - 7 x weekly - 3 sets - 10 reps  · Sit to Stand - 1 x daily - 7 x weekly - 3 sets - 10 reps          Manuals 11/14 1/3 1/10          R hip PROM  PF PF                                                 Neuro Re-Ed             Tandem             SLS  3x30" 3x30":          STD marching 10x 10x 2x10                                                              Ther Ex             STD hip 3 way  10x ea 2x10          Bridges 10x            Iso ADD/ABD             Clamshells SL             Hip ABD and SLR  10x ea 2x10          Mini squat  2x10 2x10          Sit to stand  2x10 2x10          Split lunge  10x ea 2x10          Leg press  155 lbs 1 RM R vs 165 lbs L 155 lbs           Ther Activity             TM walk   Lunge 5 min                        Gait Training                                       Modalities

## 2023-01-12 ENCOUNTER — APPOINTMENT (OUTPATIENT)
Dept: PHYSICAL THERAPY | Facility: CLINIC | Age: 73
End: 2023-01-12

## 2023-01-17 ENCOUNTER — APPOINTMENT (OUTPATIENT)
Dept: PHYSICAL THERAPY | Facility: CLINIC | Age: 73
End: 2023-01-17

## 2023-01-25 ENCOUNTER — APPOINTMENT (OUTPATIENT)
Dept: PHYSICAL THERAPY | Facility: CLINIC | Age: 73
End: 2023-01-25

## 2023-02-01 ENCOUNTER — DOCUMENTATION (OUTPATIENT)
Dept: ENDOCRINOLOGY | Facility: HOSPITAL | Age: 73
End: 2023-02-01

## 2023-02-01 ENCOUNTER — OFFICE VISIT (OUTPATIENT)
Dept: PHYSICAL THERAPY | Facility: CLINIC | Age: 73
End: 2023-02-01

## 2023-02-01 DIAGNOSIS — M16.11 PRIMARY OSTEOARTHRITIS OF ONE HIP, RIGHT: ICD-10-CM

## 2023-02-01 DIAGNOSIS — M25.551 RIGHT HIP PAIN: Primary | ICD-10-CM

## 2023-02-01 NOTE — PROGRESS NOTES
Daily Note     Today's date: 2023  Patient name: Jah Pena  : 1950  MRN: [id number]  Referring provider: Michele Nava PA-C  Dx:   Encounter Diagnosis     ICD-10-CM    1  Right hip pain  M25 551       2  Primary osteoarthritis of one hip, right  M16 11                      Subjective: Denies any lateral and posterior hip pain, still getting anterior hip discomfort  Objective: See treatment diary below      Assessment: Flexed forward ambulation due to reduced posterior excursion  Improved ability to perform retro lunge and improved ambulation post manual release and prone quad stretch  Still having discomfort with SL step up  Less pain with ITB stretch  Plan: Continue per plan of care  Precautions:  Hip; CAD; HTN, DM2; Polyneuropathy; Stage 3 kidney disease  EPOC: 22  HEP: Access Code: [email or web address]  URL: [email or web address]  Date: 2022  Prepared by: Payton Block    Exercises  · Standing March with Counter Support - 1 x daily - 7 x weekly - 3 sets - 10 reps  · Standing Hip Abduction - 1 x daily - 7 x weekly - 3 sets - 10 reps  · Standing Hip Extension with Chair - 1 x daily - 7 x weekly - 3 sets - 10 reps  · Standing Hip Flexion AROM - 1 x daily - 7 x weekly - 3 sets - 10 reps  · Sit to Stand - 1 x daily - 7 x weekly - 3 sets - 10 reps          Manuals 11/14 1/3 1/10 2/1         R hip PROM  PF PF PF         Lucien stretch    PF                                   Neuro Re-Ed             Tandem             SLS  3x30" 3x30": 3x30"         STD marching 10x 10x 2x10 2x10 BTB                                                             Ther Ex             STD hip 3 way  10x ea 2x10          Bridges 10x            Iso ADD/ABD             Clamshells SL             Hip ABD and SLR  10x ea 2x10 2x10 ea         Mini squat  2x10 2x10 2x10         Sit to stand  2x10 2x10 2x10         Split lunge  10x ea 2x10 2x10         Leg press  155 lbs 1 RM R vs 165 lbs L 155 lbs Ther Activity             TM walk   Lunge 5 min                        Gait Training                                       Modalities

## 2023-02-06 ENCOUNTER — APPOINTMENT (OUTPATIENT)
Dept: PHYSICAL THERAPY | Facility: CLINIC | Age: 73
End: 2023-02-06

## 2023-02-13 ENCOUNTER — APPOINTMENT (OUTPATIENT)
Dept: PHYSICAL THERAPY | Facility: CLINIC | Age: 73
End: 2023-02-13

## 2023-02-24 DIAGNOSIS — I10 PRIMARY HYPERTENSION: Primary | ICD-10-CM

## 2023-02-26 RX ORDER — AMITRIPTYLINE HYDROCHLORIDE 50 MG/1
50 TABLET, FILM COATED ORAL
Qty: 90 TABLET | Refills: 1 | Status: SHIPPED | OUTPATIENT
Start: 2023-02-26

## 2023-03-01 ENCOUNTER — RA CDI HCC (OUTPATIENT)
Dept: OTHER | Facility: HOSPITAL | Age: 73
End: 2023-03-01

## 2023-03-01 NOTE — PROGRESS NOTES
Jeaneth [facility] 75  coding opportunities          Chart Reviewed number of suggestions sent to Provider: 2  E11 22  I71 20     Patients Insurance     Medicare Insurance: [facility] Advantage

## 2023-03-08 ENCOUNTER — OFFICE VISIT (OUTPATIENT)
Dept: FAMILY MEDICINE CLINIC | Facility: HOSPITAL | Age: 73
End: 2023-03-08

## 2023-03-08 VITALS
SYSTOLIC BLOOD PRESSURE: 140 MMHG | HEIGHT: 70 IN | WEIGHT: 197.6 LBS | BODY MASS INDEX: 28.29 KG/M2 | HEART RATE: 83 BPM | OXYGEN SATURATION: 97 % | DIASTOLIC BLOOD PRESSURE: 80 MMHG

## 2023-03-08 DIAGNOSIS — E27.40 ADRENAL INSUFFICIENCY (HCC): ICD-10-CM

## 2023-03-08 DIAGNOSIS — E11.65 TYPE 2 DIABETES MELLITUS WITH HYPERGLYCEMIA, WITH LONG-TERM CURRENT USE OF INSULIN (HCC): ICD-10-CM

## 2023-03-08 DIAGNOSIS — Z00.00 MEDICARE ANNUAL WELLNESS VISIT, SUBSEQUENT: Primary | ICD-10-CM

## 2023-03-08 DIAGNOSIS — F11.20 CONTINUOUS OPIOID DEPENDENCE (HCC): ICD-10-CM

## 2023-03-08 DIAGNOSIS — I71.21 ANEURYSM OF ASCENDING AORTA WITHOUT RUPTURE: ICD-10-CM

## 2023-03-08 DIAGNOSIS — Z79.4 TYPE 2 DIABETES MELLITUS WITH HYPERGLYCEMIA, WITH LONG-TERM CURRENT USE OF INSULIN (HCC): ICD-10-CM

## 2023-03-08 DIAGNOSIS — N18.30 STAGE 3 CHRONIC KIDNEY DISEASE, UNSPECIFIED WHETHER STAGE 3A OR 3B CKD (HCC): ICD-10-CM

## 2023-03-08 NOTE — ASSESSMENT & PLAN NOTE
Lab Results   Component Value Date    EGFR 32 11/10/2022    EGFR 33 11/09/2022    EGFR 41 11/09/2022    CREATININE 1 99 (H) 11/10/2022    CREATININE 1 95 (H) 11/09/2022    CREATININE 1 63 (H) 11/09/2022   Stable on recent labs

## 2023-03-08 NOTE — PATIENT INSTRUCTIONS
Medicare Preventive Visit Patient Instructions  Thank you for completing your Welcome to Medicare Visit or Medicare Annual Wellness Visit today  Your next wellness visit will be due in one year (3/8/2024)  The screening/preventive services that you may require over the next 5-10 years are detailed below  Some tests may not apply to you based off risk factors and/or age  Screening tests ordered at today's visit but not completed yet may show as past due  Also, please note that scanned in results may not display below  Preventive Screenings:  Service Recommendations Previous Testing/Comments   Colorectal Cancer Screening  · Colonoscopy    · Fecal Occult Blood Test (FOBT)/Fecal Immunochemical Test (FIT)  · Fecal DNA/Cologuard Test  · Flexible Sigmoidoscopy Age: 39-70 years old   Colonoscopy: every 10 years (May be performed more frequently if at higher risk)  OR  FOBT/FIT: every 1 year  OR  Cologuard: every 3 years  OR  Sigmoidoscopy: every 5 years  Screening may be recommended earlier than age 39 if at higher risk for colorectal cancer  Also, an individualized decision between you and your healthcare provider will decide whether screening between the ages of 74-80 would be appropriate   Colonoscopy: Not on file  FOBT/FIT: Not on file  Cologuard: Not on file  Sigmoidoscopy: Not on file          Prostate Cancer Screening Individualized decision between patient and health care provider in men between ages of 53-78   Medicare will cover every 12 months beginning on the day after your 50th birthday PSA: No results in last 5 years           Hepatitis C Screening Once for adults born between [birth year] and 1965  More frequently in patients at high risk for Hepatitis C Hep C Antibody: Not on file        Diabetes Screening 1-2 times per year if you're at risk for diabetes or have pre-diabetes Fasting glucose: 264 mg/dL (11/9/2022)  A1C: 7 5 (7/29/2022)  Screening Not Indicated  History Diabetes   Cholesterol Screening Once every 5 years if you don't have a lipid disorder  May order more often based on risk factors  Lipid panel: Not on file  Screening Not Indicated  History Lipid Disorder      Other Preventive Screenings Covered by Medicare:  1  Abdominal Aortic Aneurysm (AAA) Screening: covered once if your at risk  You're considered to be at risk if you have a family history of AAA or a male between the age of 73-68 who smoking at least 100 cigarettes in your lifetime  2  Lung Cancer Screening: covers low dose CT scan once per year if you meet all of the following conditions: (1) Age 50-69; (2) No signs or symptoms of lung cancer; (3) Current smoker or have quit smoking within the last 15 years; (4) You have a tobacco smoking history of at least 20 pack years (packs per day x number of years you smoked); (5) You get a written order from a healthcare provider  3  Glaucoma Screening: covered annually if you're considered high risk: (1) You have diabetes OR (2) Family history of glaucoma OR (3)  aged 48 and older OR (3)  American aged 72 and older  3  Osteoporosis Screening: covered every 2 years if you meet one of the following conditions: (1) Have a vertebral abnormality; (2) On glucocorticoid therapy for more than 3 months; (3) Have primary hyperparathyroidism; (4) On osteoporosis medications and need to assess response to drug therapy  5  HIV Screening: covered annually if you're between the age of 12-76  Also covered annually if you are younger than 13 and older than 72 with risk factors for HIV infection  For pregnant patients, it is covered up to 3 times per pregnancy      Immunizations:  Immunization Recommendations   Influenza Vaccine Annual influenza vaccination during flu season is recommended for all persons aged >= 6 months who do not have contraindications   Pneumococcal Vaccine   * Pneumococcal conjugate vaccine = PCV13 (Prevnar 13), PCV15 (Vaxneuvance), PCV20 (Prevnar 20)  * Pneumococcal polysaccharide vaccine = PPSV23 (Pneumovax) Adults 2364 years old: 1-3 doses may be recommended based on certain risk factors  Adults 72 years old: 1-2 doses may be recommended based off what pneumonia vaccine you previously received   Hepatitis B Vaccine 3 dose series if at intermediate or high risk (ex: diabetes, end stage renal disease, liver disease)   Tetanus (Td) Vaccine - COST NOT COVERED BY MEDICARE PART B Following completion of primary series, a booster dose should be given every 10 years to maintain immunity against tetanus  Td may also be given as tetanus wound prophylaxis  Tdap Vaccine - COST NOT COVERED BY MEDICARE PART B Recommended at least once for all adults  For pregnant patients, recommended with each pregnancy  Shingles Vaccine (Shingrix) - COST NOT COVERED BY MEDICARE PART B  2 shot series recommended in those aged 48 and above     Health Maintenance Due:      Topic Date Due   • Hepatitis C Screening  Never done   • Colorectal Cancer Screening  04/01/2023 (Originally 12/26/1995)     Immunizations Due:      Topic Date Due   • COVID-19 Vaccine (5 - Booster for Ding Peter series) 05/30/2022   • Influenza Vaccine (1) 09/01/2022     Advance Directives   What are advance directives? Advance directives are legal documents that state your wishes and plans for medical care  These plans are made ahead of time in case you lose your ability to make decisions for yourself  Advance directives can apply to any medical decision, such as the treatments you want, and if you want to donate organs  What are the types of advance directives? There are many types of advance directives, and each state has rules about how to use them  You may choose a combination of any of the following:  · Living will: This is a written record of the treatment you want  You can also choose which treatments you do not want, which to limit, and which to stop at a certain time   This includes surgery, medicine, IV fluid, and tube feedings  · Durable power of  for healthcare [facility]): This is a written record that states who you want to make healthcare choices for you when you are unable to make them for yourself  This person, called a proxy, is usually a family member or a friend  You may choose more than 1 proxy  · Do not resuscitate (DNR) order:  A DNR order is used in case your heart stops beating or you stop breathing  It is a request not to have certain forms of treatment, such as CPR  A DNR order may be included in other types of advance directives  · Medical directive: This covers the care that you want if you are in a coma, near death, or unable to make decisions for yourself  You can list the treatments you want for each condition  Treatment may include pain medicine, surgery, blood transfusions, dialysis, IV or tube feedings, and a ventilator (breathing machine)  · Values history: This document has questions about your views, beliefs, and how you feel and think about life  This information can help others choose the care that you would choose  Why are advance directives important? An advance directive helps you control your care  Although spoken wishes may be used, it is better to have your wishes written down  Spoken wishes can be misunderstood, or not followed  Treatments may be given even if you do not want them  An advance directive may make it easier for your family to make difficult choices about your care  Fall Prevention    Fall prevention  includes ways to make your home and other areas safer  It also includes ways you can move more carefully to prevent a fall  Health conditions that cause changes in your blood pressure, vision, or muscle strength and coordination may increase your risk for falls  Medicines may also increase your risk for falls if they make you dizzy, weak, or sleepy  Fall prevention tips:   · Stand or sit up slowly  · Use assistive devices as directed      · Wear shoes that fit well and have soles that   · Wear a personal alarm  · Stay active  · Manage your medical conditions  Home Safety Tips:  · Add items to prevent falls in the bathroom  · Keep paths clear  · Install bright lights in your home  · Keep items you use often on shelves within reach  · Paint or place reflective tape on the edges of your stairs  Weight Management   Why it is important to manage your weight:  Being overweight increases your risk of health conditions such as heart disease, high blood pressure, type 2 diabetes, and certain types of cancer  It can also increase your risk for osteoarthritis, sleep apnea, and other respiratory problems  Aim for a slow, steady weight loss  Even a small amount of weight loss can lower your risk of health problems  How to lose weight safely:  A safe and healthy way to lose weight is to eat fewer calories and get regular exercise  You can lose up about 1 pound a week by decreasing the number of calories you eat by 500 calories each day  Healthy meal plan for weight management:  A healthy meal plan includes a variety of foods, contains fewer calories, and helps you stay healthy  A healthy meal plan includes the following:  · Eat whole-grain foods more often  A healthy meal plan should contain fiber  Fiber is the part of grains, fruits, and vegetables that is not broken down by your body  Whole-grain foods are healthy and provide extra fiber in your diet  Some examples of whole-grain foods are whole-wheat breads and pastas, oatmeal, brown rice, and bulgur  · Eat a variety of vegetables every day  Include dark, leafy greens such as spinach, kale, rolando greens, and mustard greens  Eat yellow and orange vegetables such as carrots, sweet potatoes, and winter squash  · Eat a variety of fruits every day  Choose fresh or canned fruit (canned in its own juice or light syrup) instead of juice  Fruit juice has very little or no fiber    · Eat low-fat dairy foods   Drink fat-free (skim) milk or 1% milk  Eat fat-free yogurt and low-fat cottage cheese  Try low-fat cheeses such as mozzarella and other reduced-fat cheeses  · Choose meat and other protein foods that are low in fat  Choose beans or other legumes such as split peas or lentils  Choose fish, skinless poultry (chicken or turkey), or lean cuts of red meat (beef or pork)  Before you cook meat or poultry, cut off any visible fat  · Use less fat and oil  Try baking foods instead of frying them  Add less fat, such as margarine, sour cream, regular salad dressing and mayonnaise to foods  Eat fewer high-fat foods  Some examples of high-fat foods include french fries, doughnuts, ice cream, and cakes  · Eat fewer sweets  Limit foods and drinks that are high in sugar  This includes candy, cookies, regular soda, and sweetened drinks  Exercise:  Exercise at least 30 minutes per day on most days of the week  Some examples of exercise include walking, biking, dancing, and swimming  You can also fit in more physical activity by taking the stairs instead of the elevator or parking farther away from stores  Ask your healthcare provider about the best exercise plan for you  Narcotic (Opioid) Safety    Use narcotics safely:  · Take prescribed narcotics exactly as directed  · Do not give narcotics to others or take narcotics that belong to someone else  · Do not mix narcotics without medicines or alcohol  · Do not drive or operate heavy machinery after you take the narcotic  · Monitor for side effects and notify your healthcare provider if you experienced side effects such as nausea, sleepiness, itching, or trouble thinking clearly  Manage constipation:    Constipation is the most common side effect of narcotic medicine  Constipation is when you have hard, dry bowel movements, or you go longer than usual between bowel movements   Tell your healthcare provider about all changes in your bowel movements while you are taking narcotics  He or she may recommend laxative medicine to help you have a bowel movement  He or she may also change the kind of narcotic you are taking, or change when you take it  The following are more ways you can prevent or relieve constipation:    · Drink liquids as directed  You may need to drink extra liquids to help soften and move your bowels  Ask how much liquid to drink each day and which liquids are best for you  · Eat high-fiber foods  This may help decrease constipation by adding bulk to your bowel movements  High-fiber foods include fruits, vegetables, whole-grain breads and cereals, and beans  Your healthcare provider or dietitian can help you create a high-fiber meal plan  Your provider may also recommend a fiber supplement if you cannot get enough fiber from food  · Exercise regularly  Regular physical activity can help stimulate your intestines  Walking is a good exercise to prevent or relieve constipation  Ask which exercises are best for you  · Schedule a time each day to have a bowel movement  This may help train your body to have regular bowel movements  Bend forward while you are on the toilet to help move the bowel movement out  Sit on the toilet for at least 10 minutes, even if you do not have a bowel movement  Store narcotics safely:   · Store narcotics where others cannot easily get them  Keep them in a locked cabinet or secure area  Do not  keep them in a purse or other bag you carry with you  A person may be looking for something else and find the narcotics  · Make sure narcotics are stored out of the reach of children  A child can easily overdose on narcotics  Narcotics may look like candy to a small child  The best way to dispose of narcotics: The laws vary by country and area  In the [place], the best way is to return the narcotics through a take-back program  This program is offered by the [employer] ([employer])   The following are options for using the program:  · Take the narcotics to a [employer] collection site  The site is often a law enforcement center  Call your local law enforcement center for scheduled take-back days in your area  You will be given information on where to go if the collection site is in a different location  · Take the narcotics to an approved pharmacy or hospital   A pharmacy or hospital may be set up as a collection site  You will need to ask if it is a [employer] collection site if you were not directed there  A pharmacy or doctor's office may not be able to take back narcotics unless it is a [employer] site  · Use a mail-back system  This means you are given containers to put the narcotics into  You will then mail them in the containers  · Use a take-back drop box  This is a place to leave the narcotics at any time  People and animals will not be able to get into the box  Your local law enforcement agency can tell you where to find a drop box in your area  Other ways to manage pain:   · Ask your healthcare provider about non-narcotic medicines to control pain  Nonprescription medicines include NSAIDs (such as ibuprofen) and acetaminophen  Prescription medicines include muscle relaxers, antidepressants, and steroids  · Pain may be managed without any medicines  Some ways to relieve pain include massage, aromatherapy, or meditation  Physical or occupational therapy may also help  For more information:   · Drug Enforcement Administration  [facility] [address]  Phone: 4- 528 - [phone]  Web Address: [facility]/drug_[email or web address]    · [address]  and Drug Administration  [facility] , [address] [facility]  Phone: 5- 610 - 987-9793  Web Address: http://[employer]/     © Copyright [address] 2018 Information is for End User's use only and may not be sold, redistributed or otherwise used for commercial purposes   All illustrations and images included in CareNotes® are the copyrighted property of A D A M , Inc  or [address] [place], [place] [address]lin [facility]

## 2023-03-08 NOTE — PROGRESS NOTES
Assessment and Plan:     Problem List Items Addressed This Visit        Endocrine    Type 2 diabetes mellitus with hyperglycemia, with long-term current use of insulin ([facility])    Relevant Orders    Microalbumin, Random Urine (W/Creatinine)    Ambulatory Referral to Podiatry    Adrenal insufficiency ([place] [id number] )       Cardiovascular and Mediastinum    Aneurysm of ascending aorta without rupture     Is checked regularly-  / Elias Matos in [place]= no change            Genitourinary    Stage 3 chronic kidney disease, unspecified whether stage 3a or 3b CKD ([place] [address] )     Lab Results   Component Value Date    EGFR 32 11/10/2022    EGFR 33 11/09/2022    EGFR 41 11/09/2022    CREATININE 1 99 (H) 11/10/2022    CREATININE 1 95 (H) 11/09/2022    CREATININE 1 63 (H) 11/09/2022   Stable on recent labs            Other    Continuous opioid dependence ([place] [address] )     On norco with rheumaotolgy        Other Visit Diagnoses     Medicare annual wellness visit, subsequent    -  Primary        BMI Counseling: Body mass index is 28 35 kg/m²  The BMI is above normal  Nutrition recommendations include encouraging healthy choices of fruits and vegetables, moderation in carbohydrate intake and increasing intake of lean protein  Exercise recommendations include exercising 3-5 times per week  Rationale for BMI follow-up plan is due to patient being overweight or obese  Looking forward to golf and walking more- completed pt for his right hip      Preventive health issues were discussed with patient, and age appropriate screening tests were ordered as noted in patient's After Visit Summary  Personalized health advice and appropriate referrals for health education or preventive services given if needed, as noted in patient's After Visit Summary       History of Present Illness:     Patient presents for a Medicare Wellness Visit    Medicare wellness today  Had right hip replacement with Dr Christina Umanzor in November- some ongoing edema on right quad- had  Ongoing issues with the left hip  Had prior left knee replacement- no pain but has some instability   has cgm- is 96- dexcom 6 and now has insulin pump- had dropped to 72 yesterday and felt some symptms     Patient Care Team:  Heena Valencia DO as PCP - General (Internal Medicine)  Noah Schaumann, MD as PCP - Endocrinology (Endocrinology)  Johnson Serrano (Endocrinology)  Nyla Hearn PA-C (Endocrinology)     Review of Systems:     Review of Systems   Respiratory: Negative for shortness of breath  Cardiovascular: Positive for palpitations  Negative for chest pain  Rare brief episodes- not any pattern   Gastrointestinal: Negative for constipation and diarrhea  All other systems reviewed and are negative         Problem List:     Patient Active Problem List   Diagnosis   • Type 2 diabetes mellitus with hyperglycemia, with long-term current use of insulin ([facility] [address] )   • Hypercholesterolemia with hypertriglyceridemia   • Primary hypertension   • Diabetic polyneuropathy associated with type 2 diabetes mellitus ([address] )   • Stage 3 chronic kidney disease, unspecified whether stage 3a or 3b CKD ([facility] [address] )   • Primary osteoarthritis of one hip, right   • Right hip pain   • CAD (coronary artery disease)   • Hx of chronic kidney disease   • Incomplete tear of right rotator cuff   • Osteoarthrosis, hand   • Primary osteoarthritis of knee   • Thoracic aortic aneurysm without rupture   • Gout   • Continuous opioid dependence ([facility] [address] )   • Aneurysm of ascending aorta without rupture   • Adrenal insufficiency (HCC)      Past Medical and Surgical History:     Past Medical History:   Diagnosis Date   • Arthritis    • Cervical disc disease     C2-7, needs repair   • Coronary artery disease     angina is pain in throat   • 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   • Hypertension    • TIA (transient ischemic attack)     X 2     Past Surgical History:   Procedure Laterality Date   • ANKLE ARTHROSCOPY Bilateral    • APPENDECTOMY     • CATARACT EXTRACTION, BILATERAL Bilateral    • CHOLECYSTECTOMY     • CORONARY ANGIOPLASTY WITH STENT PLACEMENT      2 stents in LAD   • ELBOW ARTHROSCOPY Bilateral     with right elbow tear repaired   • ERCP     • KNEE ARTHROSCOPY Right    • LUMBAR LAMINECTOMY      5 times   • MULTIPLE TOOTH EXTRACTIONS     • NOSE SURGERY  2021    fracture post a fall   • AR ARTHRP ACETBLR/PROX FEM PROSTC AGRFT/ALGRFT Right 11/9/2022    Procedure: ARTHROPLASTY HIP TOTAL ANTERIOR,NAVIGATED- SAME DAY; Surgeon: Nilsa Nielsen DO;  Location:  MAIN OR;  Service: Orthopedics   • REPLACEMENT TOTAL KNEE Left    • SINUS SURGERY     • THUMB FUSION Bilateral     thumb repair with bone grafts   • TONSILLECTOMY AND ADENOIDECTOMY        Family History:     Family History   Problem Relation Age of Onset   • Uterine cancer Mother    • Colon cancer Father    • Cancer Sister         corneal   • No Known Problems Brother       Social History:     Social History     Socioeconomic History   • Marital status: Single     Spouse name: None   • Number of children: None   • Years of education: None   • Highest education level: None   Occupational History   • Occupation: trauma and hospice    Tobacco Use   • Smoking status: Former   • Smokeless tobacco: Never   Vaping Use   • Vaping Use: Never used   Substance and Sexual Activity   • Alcohol use: Yes     Comment: occasionally a glass of wine once a month    • Drug use: Not Currently   • Sexual activity: None   Other Topics Concern   • None   Social History Narrative   • None     Social Determinants of Health     Financial Resource Strain: Low Risk    • Difficulty of Paying Living Expenses: Not hard at all   Food Insecurity: Not on file   Transportation Needs: No Transportation Needs   • Lack of Transportation (Medical): No   • Lack of Transportation (Non-Medical):  No   Physical Activity: Not on file   Stress: Not on file   Social Connections: Not on file   Intimate Partner Violence: Not on file Housing Stability: Not on file      Medications and Allergies:     Current Outpatient Medications   Medication Sig Dispense Refill   • allopurinol (ZYLOPRIM) 100 mg tablet Take 100 mg by mouth daily     • allopurinol (ZYLOPRIM) 300 mg tablet Take 300 mg by mouth daily     • amitriptyline (ELAVIL) 50 mg tablet Take 1 tablet (50 mg total) by mouth daily at bedtime 90 tablet 1   • aspirin (ECOTRIN LOW STRENGTH) 81 mg EC tablet Take 1 tablet (81 mg total) by mouth 2 (two) times a day 60 tablet 0   • atorvastatin (LIPITOR) 40 mg tablet Take 40 mg by mouth daily     • Blood Glucose Monitoring Suppl (Accu-Chek Guide Me) w/Device KIT Check blood sugars 3 times daily 1 kit 0   • ezetimibe (ZETIA) 10 mg tablet Take 10 mg by mouth daily     • HYDROcodone-acetaminophen (NORCO) 5-325 mg per tablet      • insulin aspart (NovoLOG FlexPen) 100 UNIT/ML injection pen INJECT 10 UNITS SUBCUTANEOUSLY 3 TIMES DAILY WITH MEALS (Patient taking differently: INJECT 14 UNITS SUBCUTANEOUSLY 3 TIMES DAILY WITH MEALS) 30 mL 1   • Klor-Con M20 20 MEQ tablet Take 20 mEq by mouth daily       • Lantus SoloStar 100 units/mL SOPN INJECT 30 UNITS UNDER THE SKIN DAILY AT BEDTIME 15 mL 2   • Magnesium 400 MG TABS Take by mouth daily       • Multiple Vitamins-Minerals (Multivitamin Men 50+) TABS Take by mouth daily     • nebivolol (BYSTOLIC) 2 5 mg tablet EVERY NIGHT TAKE 1 TABLET BY MOUTH EVERY DAY     • NIFEdipine (PROCARDIA XL) 30 mg 24 hr tablet Take 60 mg by mouth every evening     • acetaminophen (TYLENOL) 650 mg CR tablet Take 1 tablet (650 mg total) by mouth every 8 (eight) hours as needed for mild pain 30 tablet 0   • ascorbic acid (VITAMIN C) 500 MG tablet Take 1 tablet (500 mg total) by mouth 2 (two) times a day 30 tablet 3   • cholecalciferol (VITAMIN D3) 1,000 units tablet Take 2 tablets (2,000 Units total) by mouth daily 30 tablet 0   • folic acid (FOLVITE) 1 mg tablet Take 1 tablet (1 mg total) by mouth daily 30 tablet 3   • ondansetron (Zofran ODT) 4 mg disintegrating tablet Take 1 tablet (4 mg total) by mouth every 6 (six) hours as needed for nausea or vomiting 20 tablet 0   • senna-docusate sodium (SENOKOT S) 8 6-50 mg per tablet Take 1 tablet by mouth daily 30 tablet 0     No current facility-administered medications for this visit  Allergies   Allergen Reactions   • Mushroom Extract Complex - Food Allergy Anaphylaxis   • Nitroglycerin Vomiting and Headache      Immunizations:     Immunization History   Administered Date(s) Administered   • COVID-19 PFIZER VACCINE 0 3 ML IM 02/14/2021, 03/14/2021, 10/03/2021   • COVID-19 Pfizer vac (Mateo-sucrose, gray cap) 12 yr+ IM 04/04/2022      Health Maintenance:         Topic Date Due   • Hepatitis C Screening  Never done   • Colorectal Cancer Screening  04/01/2023 (Originally 12/26/1995)         Topic Date Due   • COVID-19 Vaccine (5 - Booster for Pfizer series) 05/30/2022   • Influenza Vaccine (1) 09/01/2022      Medicare Screening Tests and Risk Assessments:     Ramo Carballo is here for his Subsequent Wellness visit  Health Risk Assessment:   Patient rates overall health as fair  Patient feels that their physical health rating is same  Patient is satisfied with their life  Eyesight was rated as same  Hearing was rated as slightly worse  Patient feels that their emotional and mental health rating is same  Patients states they are sometimes angry  Patient states they are sometimes unusually tired/fatigued  Pain experienced in the last 7 days has been some  Patient's pain rating has been 7/10  Patient states that he has experienced no weight loss or gain in last 6 months  Fall Risk Screening: In the past year, patient has experienced: history of falling in past year      Home Safety:  Patient does not have trouble with stairs inside or outside of their home  Patient has working smoke alarms and has no working carbon monoxide detector  Home safety hazards include: none       Nutrition:   Current diet is Diabetic  Medications:   Patient is currently taking over-the-counter supplements  OTC medications include: Vitamin  Patient is able to manage medications  Activities of Daily Living (ADLs)/Instrumental Activities of Daily Living (IADLs):   Walk and transfer into and out of bed and chair?: Yes  Dress and groom yourself?: Yes    Bathe or shower yourself?: Yes    Feed yourself? Yes  Do your laundry/housekeeping?: Yes  Manage your money, pay your bills and track your expenses?: Yes  Make your own meals?: Yes    Do your own shopping?: Yes    Previous Hospitalizations:   Any hospitalizations or ED visits within the last 12 months?: No      Advance Care Planning:   Living will: Yes    Durable POA for healthcare:  Yes    Advanced directive: Yes    Advanced directive counseling given: Yes    End of Life Decisions reviewed with patient: Yes    Provider agrees with end of life decisions: Yes      Comments:  Full code - but if no hope for recovery would not want long term support  Jerald and ruben sons would be medical poa    Cognitive Screening:   Provider or family/friend/caregiver concerned regarding cognition?: No    PREVENTIVE SCREENINGS      Cardiovascular Screening:    General: Screening Not Indicated, History Lipid Disorder and Risks and Benefits Discussed      Diabetes Screening:     General: Screening Not Indicated, History Diabetes and Risks and Benefits Discussed      Colorectal Cancer Screening:     General: Risks and Benefits Discussed      Prostate Cancer Screening:    General: Risks and Benefits Discussed      Osteoporosis Screening:    General: Risks and Benefits Discussed      Abdominal Aortic Aneurysm (AAA) Screening:    Risk factors include: age between 73-69 yo and tobacco use        Lung Cancer Screening:     General: Screening Not Indicated      Hepatitis C Screening:    General: Risks and Benefits Discussed    Screening, Brief Intervention, and Referral to Treatment (SBIRT)    Screening  Typical number of drinks in a day: 0  Typical number of drinks in a week: 0  Interpretation: Low risk drinking behavior  AUDIT-C Screenin) How often did you have a drink containing alcohol in the past year? monthly or less  2) How many drinks did you have on a typical day when you were drinking in the past year? 0  3) How often did you have 6 or more drinks on one occasion in the past year? never    AUDIT-C Score: 1  Interpretation: Score 0-3 (male): Negative screen for alcohol misuse    Single Item Drug Screening:  How often have you used an illegal drug (including marijuana) or a prescription medication for non-medical reasons in the past year? never    Single Item Drug Screen Score: 0  Interpretation: Negative screen for possible drug use disorder    Review of Current Opioid Use    Opioid Risk Tool (ORT) Interpretation: Complete Opioid Risk Tool (ORT)    No results found  Physical Exam:     /80   Pulse 83   Ht 5' 10" (1 778 m)   Wt 89 6 kg (197 lb 9 6 oz)   SpO2 97%   BMI 28 35 kg/m²     Physical Exam  Vitals and nursing note reviewed  Constitutional:       General: He is not in acute distress  HENT:      Head: Normocephalic  Right Ear: There is no impacted cerumen  Left Ear: There is no impacted cerumen  Nose: No congestion  Eyes:      General:         Right eye: No discharge  Left eye: No discharge  Extraocular Movements: Extraocular movements intact  Pupils: Pupils are equal, round, and reactive to light  Cardiovascular:      Rate and Rhythm: Normal rate and regular rhythm  Pulses:           Dorsalis pedis pulses are 1+ on the right side and 1+ on the left side  Posterior tibial pulses are 1+ on the right side and 1+ on the left side  Heart sounds: No murmur heard  Pulmonary:      Breath sounds: No wheezing or rhonchi  Abdominal:      General: Abdomen is flat  There is no distension  Palpations: Abdomen is soft        Tenderness: There is no abdominal tenderness  Musculoskeletal:         General: No tenderness  Right lower leg: No edema  Left lower leg: No edema  Comments: Mild right upper leg edema-no calf tenderness   Feet:      Right foot:      Skin integrity: No ulcer, skin breakdown, erythema, warmth, callus or dry skin  Left foot:      Skin integrity: No ulcer, skin breakdown, erythema, warmth, callus or dry skin  Lymphadenopathy:      Cervical: No cervical adenopathy  Neurological:      General: No focal deficit present  Mental Status: He is alert  Psychiatric:         Mood and Affect: Mood normal          Thought Content: Thought content normal          Judgment: Judgment normal       Patient's shoes and socks removed  Right Foot/Ankle   Right Foot Inspection  Skin Exam: skin normal and skin intact  No dry skin, no warmth, no callus, no erythema, no maceration, no abnormal color, no pre-ulcer, no ulcer and no callus  Toe Exam: ROM and strength within normal limits  Sensory   Proprioception: absent  Monofilament testing: diminished    Vascular  Capillary refills: < 3 seconds  The right DP pulse is 1+  The right PT pulse is 1+  Left Foot/Ankle  Left Foot Inspection  Skin Exam: skin normal and skin intact  No dry skin, no warmth, no erythema, no maceration, normal color, no pre-ulcer, no ulcer and no callus  Toe Exam: ROM and strength within normal limits  Sensory   Proprioception: absent  Monofilament testing: diminished    Vascular  Capillary refills: < 3 seconds  The left DP pulse is 1+  The left PT pulse is 1+       Ashleigh Zamora, DO

## 2023-03-09 ENCOUNTER — OFFICE VISIT (OUTPATIENT)
Dept: ENDOCRINOLOGY | Facility: HOSPITAL | Age: 73
End: 2023-03-09

## 2023-03-09 ENCOUNTER — TELEPHONE (OUTPATIENT)
Dept: DIABETES SERVICES | Facility: CLINIC | Age: 73
End: 2023-03-09

## 2023-03-09 VITALS
HEIGHT: 70 IN | HEART RATE: 87 BPM | BODY MASS INDEX: 28.2 KG/M2 | WEIGHT: 197 LBS | SYSTOLIC BLOOD PRESSURE: 112 MMHG | DIASTOLIC BLOOD PRESSURE: 62 MMHG | OXYGEN SATURATION: 95 %

## 2023-03-09 DIAGNOSIS — E11.42 DIABETIC POLYNEUROPATHY ASSOCIATED WITH TYPE 2 DIABETES MELLITUS (HCC): ICD-10-CM

## 2023-03-09 DIAGNOSIS — E11.65 TYPE 2 DIABETES MELLITUS WITH HYPERGLYCEMIA, WITH LONG-TERM CURRENT USE OF INSULIN (HCC): Primary | ICD-10-CM

## 2023-03-09 DIAGNOSIS — E78.2 HYPERCHOLESTEROLEMIA WITH HYPERTRIGLYCERIDEMIA: ICD-10-CM

## 2023-03-09 DIAGNOSIS — I10 PRIMARY HYPERTENSION: ICD-10-CM

## 2023-03-09 DIAGNOSIS — Z79.4 TYPE 2 DIABETES MELLITUS WITH HYPERGLYCEMIA, WITH LONG-TERM CURRENT USE OF INSULIN (HCC): Primary | ICD-10-CM

## 2023-03-09 LAB
ALBUMIN/CREAT UR: 725 MCG/MG CREAT
CREAT UR-MCNC: 115 MG/DL (ref 20–320)
MICROALBUMIN UR-MCNC: 83.4 MG/DL

## 2023-03-09 RX ORDER — INSULIN ASPART 100 [IU]/ML
INJECTION, SOLUTION INTRAVENOUS; SUBCUTANEOUS
Qty: 5 ML | Refills: 6 | Status: SHIPPED | OUTPATIENT
Start: 2023-03-09 | End: 2023-03-10 | Stop reason: SDUPTHER

## 2023-03-09 NOTE — PROGRESS NOTES
Jake Larios 67 y o  male MRN: [id number]    Encounter: [id number]      Assessment/Plan     Assessment: This is a 67y o -year-old male with type 2 diabetes with neuropathy, hypertension and hyperlipidemia  Plan:  1  Type 2 diabetes, insulin requiring:  Most recent hemoglobin A1c is 6 9   Now, he will continue his current regimen  Continue to utilize Dexcom to monitor glucose levels  We will follow-up with diabetes education for a OmniPod pump start   Contact the office with any concerns or questions  Check hemoglobin A1c prior to next visit      2  Diabetic neuropathy:  Diabetic foot exam performed at previous office visit      3  Hypertension:  Normotensive in the office  Continue current regimen  Check comprehensive metabolic panel prior to next visit  4  Hyperlipidemia:  Triglycerides remain elevated  Continue current regimen  Will continue to monitor over time  CC: Type II diabetes follow-up    History of Present Illness     HPI:  70year old male with type 2 diabetes, insulin requiring for 20 years with hypertension and hyperlipidemia of hypertriglyceridemia for follow up   He was diagnosed with diabetes about 20 years ago  He is on insulin at home and takes Lantus insulin 22 units at bedtime and NovoLog insulin   Most recent hemoglobin A1c from March 2, 2023 was 6 9  He is status post right hip replacement from November 8, 2022  He denies any polyuria, polydipsia, polyphagia, and blurry vision   He has once or twice a night nocturia   He has a bit of numbness of the feet and will feel unsteady at times and has fallen multiple times  Sherif Fairly denies chest pain or shortness of breath   He denies nephropathy, retinopathy, heart attack, stroke and claudication but does admit to neuropathy, coronary artery disease and TIAs  Lawyer Alan has followed up with medical nutrition therapy recently   He was admitted to the hospital May 15 through May 18, 2022 for right-sided facial droop   Initial concern was a TIA, but was diagnosed with Bell palsy   Was placed on course of steroids, and states that symptoms lasted for about 2 5 months  He is 2 weeks post 8 right sided total hip replacement           Hypoglycemic episodes: After dinner at times recently    H/o of hypoglycemia causing hospitalization or intervention such as glucagon injection  or ambulance call  No   Hypoglycemia symptoms: jitteriness, sweating and lightheaded  Treatment of hypoglycemia: glucose packets  Glucagon:No   Medic alert tag: recommended,Yes       The patient's last eye exam was in November 2021 with no retinopathy   The patient's last foot exam was performed at his office visit on December 2, 2021       Blood Sugar/Glucometer/Pump/CGM review:  Download of Dexcom from February 13 through February 26, 2023 reveals an average glucose level of 159 with the standard deviation of 45   He is in target range 67% time, below target range 1% time, above target range 31% time  There is less than 2% low blood sugars      He has hypertension and takes losartan 28 WO IHNQL and Bystolic 19 LD AGNZT   He denies headache or stroke-like symptoms      He has hyperlipidemia with significant hypertriglyceridemia as high as 2400 and takes simvastatin 20 mg daily, Zetia 10 mg daily, and Vascepa 1 g - 4 times a day   He denies chest pain or shortness of breath  Review of Systems   Constitutional: Negative  Negative for chills, fatigue and fever  HENT: Negative  Negative for trouble swallowing and voice change  Eyes: Negative for photophobia, pain, discharge, redness, itching and visual disturbance  Respiratory: Negative for cough and shortness of breath  Cardiovascular: Negative for chest pain and palpitations  Gastrointestinal: Negative for abdominal pain, constipation, diarrhea, nausea and vomiting  Endocrine: Negative for heat intolerance, polydipsia, polyphagia and polyuria  Genitourinary: Negative  Musculoskeletal: Negative      Skin: Negative  Allergic/Immunologic: Negative  Neurological: Negative for dizziness, syncope, light-headedness and headaches  Hematological: Negative  Psychiatric/Behavioral: Negative  All other systems reviewed and are negative  Historical Information   Past Medical History:   Diagnosis Date   • Arthritis    • Cervical disc disease     C2-7, needs repair   • Coronary artery disease     angina is pain in throat   • 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   • Hypertension    • TIA (transient ischemic attack)     X 2     Past Surgical History:   Procedure Laterality Date   • ANKLE ARTHROSCOPY Bilateral    • APPENDECTOMY     • CATARACT EXTRACTION, BILATERAL Bilateral    • CHOLECYSTECTOMY     • CORONARY ANGIOPLASTY WITH STENT PLACEMENT      2 stents in LAD   • ELBOW ARTHROSCOPY Bilateral     with right elbow tear repaired   • ERCP     • KNEE ARTHROSCOPY Right    • LUMBAR LAMINECTOMY      5 times   • MULTIPLE TOOTH EXTRACTIONS     • NOSE SURGERY  2021    fracture post a fall   • NC ARTHRP ACETBLR/PROX FEM PROSTC AGRFT/ALGRFT Right 11/9/2022    Procedure: ARTHROPLASTY HIP TOTAL ANTERIOR,NAVIGATED- SAME DAY;   Surgeon: Ralph Evans DO;  Location:  MAIN OR;  Service: Orthopedics   • REPLACEMENT TOTAL KNEE Left    • SINUS SURGERY     • THUMB FUSION Bilateral     thumb repair with bone grafts   • TONSILLECTOMY AND ADENOIDECTOMY       Social History   Social History     Substance and Sexual Activity   Alcohol Use Yes    Comment: Maybe 1 drink every 3-4 weeks     Social History     Substance and Sexual Activity   Drug Use Never     Social History     Tobacco Use   Smoking Status Former   • Packs/day: 1 50   • Years: 25 00   • Pack years: 37 50   • Types: Cigarettes   Smokeless Tobacco Never     Family History:   Family History   Problem Relation Age of Onset   • Uterine cancer Mother    • Colon cancer Father    • Cancer Sister         corneal   • No Known Problems Brother Meds/Allergies   Current Outpatient Medications   Medication Sig Dispense Refill   • allopurinol (ZYLOPRIM) 100 mg tablet Take 100 mg by mouth daily     • allopurinol (ZYLOPRIM) 300 mg tablet Take 300 mg by mouth daily     • amitriptyline (ELAVIL) 50 mg tablet Take 1 tablet (50 mg total) by mouth daily at bedtime 90 tablet 1   • aspirin (ECOTRIN LOW STRENGTH) 81 mg EC tablet Take 1 tablet (81 mg total) by mouth 2 (two) times a day 60 tablet 0   • atorvastatin (LIPITOR) 40 mg tablet Take 40 mg by mouth daily     • Blood Glucose Monitoring Suppl (Accu-Chek Guide Me) w/Device KIT Check blood sugars 3 times daily 1 kit 0   • ezetimibe (ZETIA) 10 mg tablet Take 10 mg by mouth daily     • HYDROcodone-acetaminophen (NORCO) 5-325 mg per tablet      • insulin aspart (NovoLOG FlexPen) 100 UNIT/ML injection pen INJECT 10 UNITS SUBCUTANEOUSLY 3 TIMES DAILY WITH MEALS (Patient taking differently: INJECT 14 UNITS SUBCUTANEOUSLY 3 TIMES DAILY WITH MEALS) 30 mL 1   • Klor-Con M20 20 MEQ tablet Take 20 mEq by mouth daily       • Lantus SoloStar 100 units/mL SOPN INJECT 30 UNITS UNDER THE SKIN DAILY AT BEDTIME 15 mL 2   • Magnesium 400 MG TABS Take by mouth daily       • Multiple Vitamins-Minerals (Multivitamin Men 50+) TABS Take by mouth daily     • nebivolol (BYSTOLIC) 2 5 mg tablet EVERY NIGHT TAKE 1 TABLET BY MOUTH EVERY DAY     • NIFEdipine (PROCARDIA XL) 30 mg 24 hr tablet Take 60 mg by mouth every evening       No current facility-administered medications for this visit  Allergies   Allergen Reactions   • Mushroom Extract Complex - Food Allergy Anaphylaxis   • Nitroglycerin Vomiting and Headache       Objective   Vitals: Blood pressure 112/62, pulse 87, height 5' 10" (1 778 m), weight 89 4 kg (197 lb), SpO2 95 %  Physical Exam  Vitals reviewed  Constitutional:       Appearance: He is well-developed  HENT:      Head: Normocephalic and atraumatic        Nose: Nose normal    Eyes:      Conjunctiva/sclera: Conjunctivae normal       Pupils: Pupils are equal, round, and reactive to light  Comments: Wears glasses   Cardiovascular:      Rate and Rhythm: Normal rate and regular rhythm  Heart sounds: Normal heart sounds  Pulmonary:      Effort: Pulmonary effort is normal       Breath sounds: Normal breath sounds  Abdominal:      General: Bowel sounds are normal       Palpations: Abdomen is soft  Musculoskeletal:         General: Normal range of motion  Cervical back: Normal range of motion and neck supple  Skin:     General: Skin is warm and dry  Neurological:      Mental Status: He is alert and oriented to person, place, and time  Psychiatric:         Behavior: Behavior normal          Thought Content:  Thought content normal          Judgment: Judgment normal        Lab Results:   Lab Results   Component Value Date/Time    Hemoglobin A1C 7 5 07/29/2022 12:00 AM    WBC 11 59 (H) 11/10/2022 02:23 AM    WBC 12 15 (H) 11/09/2022 11:30 AM    WBC 7 99 11/09/2022 06:42 AM    Hemoglobin 9 4 (L) 11/10/2022 02:23 AM    Hemoglobin 10 6 (L) 11/09/2022 11:30 AM    Hemoglobin 13 2 11/09/2022 06:42 AM    Hematocrit 27 9 (L) 11/10/2022 02:23 AM    Hematocrit 31 7 (L) 11/09/2022 11:30 AM    Hematocrit 39 0 11/09/2022 06:42 AM    MCV 95 11/10/2022 02:23 AM    MCV 94 11/09/2022 11:30 AM    MCV 94 11/09/2022 06:42 AM    Platelets 942 90/89/0302 02:23 AM    Platelets 325 22/34/4737 11:30 AM    Platelets 987 54/13/8291 06:42 AM    BUN 31 (H) 11/10/2022 02:23 AM    BUN 27 (H) 11/09/2022 11:30 AM    BUN 25 11/09/2022 06:42 AM    Potassium 4 2 11/10/2022 02:23 AM    Potassium 4 5 11/09/2022 11:30 AM    Potassium 3 9 11/09/2022 06:42 AM    Chloride 100 11/10/2022 02:23 AM    Chloride 103 11/09/2022 11:30 AM    Chloride 102 11/09/2022 06:42 AM    CO2 28 11/10/2022 02:23 AM    CO2 25 11/09/2022 11:30 AM    CO2 25 11/09/2022 06:42 AM    Creatinine 1 99 (H) 11/10/2022 02:23 AM    Creatinine 1 95 (H) 11/09/2022 11:30 AM Creatinine 1 63 (H) 11/09/2022 06:42 AM     (H) 11/09/2022 11:30 AM    AST 45 11/09/2022 06:42 AM     (H) 11/09/2022 11:30 AM     (H) 11/09/2022 06:42 AM    Albumin 3 7 11/09/2022 11:30 AM    Albumin 4 1 11/09/2022 06:42 AM     Portions of the record may have been created with voice recognition software  Occasional wrong word or "sound a like" substitutions may have occurred due to the inherent limitations of voice recognition software  Read the chart carefully and recognize, using context, where substitutions have occurred

## 2023-03-09 NOTE — PATIENT INSTRUCTIONS
Continue same dose of Lantus  Continue Novolog at current dose  Continue with Dexcom  Follow up with Diabetes Education for the OmniPod pump start  Call with any concerns or questions  Follow up in 3 months with lab work

## 2023-03-09 NOTE — TELEPHONE ENCOUNTER
Pt has 3/28/23 appt with Elzbieta Nichole for Omnipod 5 pump start  I emailed you the pump start order  Please complete and give to Elzbieta Nichole by 3/28   Thanks

## 2023-03-10 DIAGNOSIS — E11.65 TYPE 2 DIABETES MELLITUS WITH HYPERGLYCEMIA, WITH LONG-TERM CURRENT USE OF INSULIN (HCC): ICD-10-CM

## 2023-03-10 DIAGNOSIS — N18.30 STAGE 3 CHRONIC KIDNEY DISEASE, UNSPECIFIED WHETHER STAGE 3A OR 3B CKD (HCC): Primary | ICD-10-CM

## 2023-03-10 DIAGNOSIS — R80.9 MICROALBUMINURIA: ICD-10-CM

## 2023-03-10 DIAGNOSIS — Z79.4 TYPE 2 DIABETES MELLITUS WITH HYPERGLYCEMIA, WITH LONG-TERM CURRENT USE OF INSULIN (HCC): ICD-10-CM

## 2023-03-10 RX ORDER — INSULIN ASPART 100 [IU]/ML
INJECTION, SOLUTION INTRAVENOUS; SUBCUTANEOUS
Qty: 10 ML | Refills: 3 | Status: SHIPPED | OUTPATIENT
Start: 2023-03-10 | End: 2023-03-14 | Stop reason: SDUPTHER

## 2023-03-13 ENCOUNTER — TELEPHONE (OUTPATIENT)
Dept: NEPHROLOGY | Facility: CLINIC | Age: 73
End: 2023-03-13

## 2023-03-13 ENCOUNTER — DOCUMENTATION (OUTPATIENT)
Dept: NEPHROLOGY | Facility: CLINIC | Age: 73
End: 2023-03-13

## 2023-03-13 NOTE — TELEPHONE ENCOUNTER
New Patient Intake Form   Patient Details   Khadar Ward     1950     [id number]     Insurance Information   Name of [facility] [place]    Does the patient need an insurance referral? no   If patient has Pitkatie Ochoa, please ask if they will be using their Pitkatie Ochoa  Appointment Information   Who is calling to schedule? If not patient, what is callers name? Patient    Referring Provider Dr Savannah Mattson   Reason for Appt (Diagnosis) N18 30 (ICD-10-CM) - Stage 3 chronic kidney disease, unspecified whether stage 3a or 3b CKD ([facility] )   R80 9 (ICD-10-CM) - Microalbuminuria        Does Patient have labs/urine done at [place] [address]? If not, where do they go? List the date of last lab / urine Clive Boeck    Has patient been hospitalized recently? If yes, list name and location of hospital they were In no   Has patient been seen by a Nephrologist before? If yes, list name, location and phone number no   Has the patient had renal imaging done? If so, list the most recent date and type of imagineg no   Does patient have a history of Kidney Stones? Had 2 in the past- 15 years ago    Appointment Details   Is there a referral on file?  yes   Appointment Date 07/13/23   Location  [place]   Miscellaneous

## 2023-03-14 DIAGNOSIS — E11.65 TYPE 2 DIABETES MELLITUS WITH HYPERGLYCEMIA, WITH LONG-TERM CURRENT USE OF INSULIN (HCC): ICD-10-CM

## 2023-03-14 DIAGNOSIS — Z79.4 TYPE 2 DIABETES MELLITUS WITH HYPERGLYCEMIA, WITH LONG-TERM CURRENT USE OF INSULIN (HCC): ICD-10-CM

## 2023-03-14 RX ORDER — INSULIN ASPART 100 [IU]/ML
INJECTION, SOLUTION INTRAVENOUS; SUBCUTANEOUS
Qty: 10 ML | Refills: 3 | Status: SHIPPED | OUTPATIENT
Start: 2023-03-14

## 2023-03-17 ENCOUNTER — OFFICE VISIT (OUTPATIENT)
Dept: PODIATRY | Facility: CLINIC | Age: 73
End: 2023-03-17

## 2023-03-17 VITALS
DIASTOLIC BLOOD PRESSURE: 87 MMHG | SYSTOLIC BLOOD PRESSURE: 149 MMHG | WEIGHT: 197 LBS | HEIGHT: 70 IN | HEART RATE: 109 BPM | BODY MASS INDEX: 28.2 KG/M2

## 2023-03-17 DIAGNOSIS — Z79.4 TYPE 2 DIABETES MELLITUS WITH HYPERGLYCEMIA, WITH LONG-TERM CURRENT USE OF INSULIN (HCC): ICD-10-CM

## 2023-03-17 DIAGNOSIS — E11.65 TYPE 2 DIABETES MELLITUS WITH HYPERGLYCEMIA, WITH LONG-TERM CURRENT USE OF INSULIN (HCC): ICD-10-CM

## 2023-03-17 DIAGNOSIS — B35.1 ONYCHOMYCOSIS: ICD-10-CM

## 2023-03-17 DIAGNOSIS — E11.40 TYPE 2 DIABETES MELLITUS WITH DIABETIC NEUROPATHY, WITH LONG-TERM CURRENT USE OF INSULIN (HCC): Primary | ICD-10-CM

## 2023-03-17 DIAGNOSIS — Z79.4 TYPE 2 DIABETES MELLITUS WITH DIABETIC NEUROPATHY, WITH LONG-TERM CURRENT USE OF INSULIN (HCC): Primary | ICD-10-CM

## 2023-03-20 NOTE — PROGRESS NOTES
Assessment/Plan:  Onychomycosis  Debridement mycotic nails x10 as noted below    Type 2 diabetes mellitus with diabetic neuropathy, with long-term current use of insulin ([address] )  As proper diabetic foot care and recommend follow-up every 3 months for regular at risk diabetic care  Patient should consider diabetic shoes in the future  Type 2 diabetes mellitus with hyperglycemia, with long-term current use of insulin ([address] )  -     Ambulatory Referral to Podiatry         Procedure: [address]23  All mycotic toenails were reduced and debrided in length, width, and girth using a nail nipper and electric dremel  Patient tolerated procedure(s) well without complications  Risk Category/Class Findings:   Q8(B2 ABC, B3)  0 = No loss of protective sensation  A1)  Has the patient had a previous amputation of the foot or integral skeletal portion thereof? No  B1)  Does the patient have posterior tibial pulse? No  B3)  Does the patient have absent dorsalis pedis? Yes  B2)  Does the patient have three of the following? Yes           1  Hair growth (increased or decreased), 2   Nail changes (thickening) and 3  Pigmentary changes (discoloring)  C)  Does the patient have two of the following and one above? No                Subjective:      Patient ID: Abby Mullins is a 67 y o  male  3/17/2023: 35-year-old white male type I diabetic presents for evaluation of painful nails  Reports difficulty cutting them due to the thickness  Patient primary care referred him here for diabetic foot care  Reports last HbA1c of 6 9 and is scheduled to get an insulin pump later this month  The following portions of the patient's history were reviewed and updated as appropriate: allergies, current medications, past family history, past medical history, past social history, past surgical history and problem list     Review of Systems   Constitutional: Negative for chills and fever  HENT: Negative for ear pain and sore throat      Eyes: Negative for pain and visual disturbance  Respiratory: Negative for cough and shortness of breath  Cardiovascular: Negative for chest pain and palpitations  Gastrointestinal: Negative for abdominal pain and vomiting  Genitourinary: Negative for dysuria and hematuria  Musculoskeletal: Negative for arthralgias and back pain  Skin: Negative for color change and rash  Thickened irregular nails   Neurological: Positive for numbness  Negative for seizures and syncope  All other systems reviewed and are negative  Objective:    /87   Pulse (!) 109   Ht 5' 10" (1 778 m)   Wt 89 4 kg (197 lb)   BMI 28 27 kg/m²        Physical Exam  Constitutional:       Appearance: Normal appearance  HENT:      Head: Normocephalic  Right Ear: Tympanic membrane normal       Left Ear: Tympanic membrane normal       Nose: No congestion  Mouth/Throat:      Pharynx: No oropharyngeal exudate or posterior oropharyngeal erythema  Cardiovascular:      Rate and Rhythm: Normal rate and regular rhythm  Pulses:           Dorsalis pedis pulses are 0 on the right side and 0 on the left side  Posterior tibial pulses are 1+ on the right side and 1+ on the left side  Pulmonary:      Effort: Pulmonary effort is normal    Musculoskeletal:         General: Normal range of motion  Feet:      Right foot:      Protective Sensation: 10 sites tested  7 sites sensed  Toenail Condition: Right toenails are abnormally thick and long  Fungal disease present  Left foot:      Protective Sensation: 10 sites tested  7 sites sensed  Toenail Condition: Left toenails are abnormally thick and long  Fungal disease present  Skin:     General: Skin is warm and dry  Capillary Refill: Capillary refill takes 2 to 3 seconds  Coloration: Skin is not pale  Findings: No bruising, erythema, lesion or rash        Comments: Diminished texture tone and turgor with moderate atrophic changes noted bilateral   No digital hair noted  Dystrophic nails noted right 1 2 and 4, left 1 2 and 3 with yellow discoloration, subungual debris, brittle nature, and 0 5 cm average thickness  Mild tenderness with palpation  Neurological:      General: No focal deficit present  Mental Status: He is alert  Cranial Nerves: No cranial nerve deficit  Sensory: Sensory deficit (Vibratory sensation and tingling/numb paresthesias bilateral) present  Motor: No weakness        Gait: Gait normal       Deep Tendon Reflexes: Reflexes normal    Psychiatric:         Mood and Affect: Mood normal          Behavior: Behavior normal          Judgment: Judgment normal

## 2023-03-28 ENCOUNTER — OFFICE VISIT (OUTPATIENT)
Dept: DIABETES SERVICES | Facility: HOSPITAL | Age: 73
End: 2023-03-28

## 2023-03-28 VITALS — HEIGHT: 70 IN | BODY MASS INDEX: 27.2 KG/M2 | WEIGHT: 190 LBS

## 2023-03-28 DIAGNOSIS — E11.65 TYPE 2 DIABETES MELLITUS WITH HYPERGLYCEMIA, WITH LONG-TERM CURRENT USE OF INSULIN (HCC): Primary | ICD-10-CM

## 2023-03-28 DIAGNOSIS — Z79.4 TYPE 2 DIABETES MELLITUS WITH HYPERGLYCEMIA, WITH LONG-TERM CURRENT USE OF INSULIN (HCC): Primary | ICD-10-CM

## 2023-03-28 NOTE — PROGRESS NOTES
Consuelo    Met with Dwayne Rivera for insulin pump training on the [address] training  Training completed per training checklist scanned to chart  Training completed on filling the pod, Pod insertion, how to take boluses, advanced features, alerts and alarms  Set up patient's PDM in office today  Asked Dwayne Rivera to create a gloocko account at home if they don't have one already so their account can be linked to [facility] for downloads and review in between visits- this was already done  Answered all of their questions  Will call with questions or for more education  Observations: Met with Josiah Martinez for [employer]  Met with him in conjunction with the rep from the company  He did well with his training, but will take some practice  He does not have a set consistent way of taking his mealtime insulin, therefore we created settings from scratch  Scheduled for follow-up in a few weeks with me for download and troubleshooting issues      Settings:  Basal  12am-3am: 1 15  3am-6am: 1 2  6am-10pm: 1 2  10pm-12an: 1 15    ICR: 8  CF: 30  Target: 110  Duration: 3 5hr    Temp rate set to zero until 9pm to prevent double dipping basal      Dwayne Rivera was provided with the education materials provided by [employer]   Component Value Date    HGBA1C 7 5 07/29/2022       Lab Results   Component Value Date    K 4 2 11/10/2022     11/10/2022    CO2 28 11/10/2022    AGAP 7 11/10/2022    BUN 31 (H) 11/10/2022    CREATININE 1 99 (H) 11/10/2022    GLUC 224 (H) 11/10/2022    GLUF 264 (H) 11/09/2022    CALCIUM 8 0 (L) 11/10/2022     (H) 11/09/2022     (H) 11/09/2022    ALKPHOS 181 (H) 11/09/2022    TP 6 8 11/09/2022    TBILI 1 30 (H) 11/09/2022    EGFR 32 11/10/2022       Patient response to instruction    Comprehension: good  Motivation: good  Expected Compliance: good  Response to Teachback: 100%, demonstrated understanding     Thank you for referring your patient to [facility], it was a pleasure working with them today  Please feel free to call with any questions or concerns      Latosha Delatorre, [address] [place] [phone]

## 2023-03-29 ENCOUNTER — TELEPHONE (OUTPATIENT)
Dept: DIABETES SERVICES | Facility: HOSPITAL | Age: 73
End: 2023-03-29

## 2023-03-29 DIAGNOSIS — Z79.4 TYPE 2 DIABETES MELLITUS WITH HYPERGLYCEMIA, WITH LONG-TERM CURRENT USE OF INSULIN (HCC): Primary | ICD-10-CM

## 2023-03-29 DIAGNOSIS — E11.65 TYPE 2 DIABETES MELLITUS WITH HYPERGLYCEMIA, WITH LONG-TERM CURRENT USE OF INSULIN (HCC): Primary | ICD-10-CM

## 2023-03-29 RX ORDER — INSULIN PMP CART,AUT,G6/7,CNTR
EACH SUBCUTANEOUS
Qty: 10 EACH | Refills: 3 | Status: SHIPPED | OUTPATIENT
Start: 2023-03-29

## 2023-03-29 NOTE — TELEPHONE ENCOUNTER
I called patient to inform him that we ordered his OmniPod 5 pods to this get back pharmacy on file as he requested  He needs to call [phone] himself to cancel the mail order supply, he will do so  States he was fine last night with his OmniPod, but around 3 AM he received a notice that his pod was blocked and not receiving insulin and to change pod  It does not sound like he has done that, wants to know what the situation was there, he took his pens with him to work today and is just using those, very cordial about at all  His blood sugars are 150  I will have the rep that did the training give him a call to try to troubleshoot issues with the current pod and figure out what the issue may have been so he can resume pumping

## 2023-04-05 ENCOUNTER — OFFICE VISIT (OUTPATIENT)
Dept: DIABETES SERVICES | Facility: HOSPITAL | Age: 73
End: 2023-04-05

## 2023-04-05 VITALS — BODY MASS INDEX: 27.2 KG/M2 | WEIGHT: 190 LBS | HEIGHT: 70 IN

## 2023-04-05 DIAGNOSIS — E11.65 TYPE 2 DIABETES MELLITUS WITH HYPERGLYCEMIA, WITH LONG-TERM CURRENT USE OF INSULIN (HCC): Primary | ICD-10-CM

## 2023-04-05 DIAGNOSIS — Z79.4 TYPE 2 DIABETES MELLITUS WITH HYPERGLYCEMIA, WITH LONG-TERM CURRENT USE OF INSULIN (HCC): Primary | ICD-10-CM

## 2023-04-06 NOTE — PROGRESS NOTES
"Omnipod F/U    HPI: Met with Salvador Harrington for DSME Follow-up visit  Here today for first download and review of his OmniPod  In goal range 57%, high 39%, very high 4%, low blood sugars 0%  Download reviewed also in conjunction with the representative from [address] [place] [address] who assisted in his training  He is not using the use CGM button which does aid in using the predictive nature of the automated mode, he will start to do this  Also discussed making sure to always look at the device to make sure he is in the automated mode as he has been in and about 75% of the time, states he has been trying to do this  Download reviewed, blood sugars tend to be steady throughout the morning but they rise after his lunch which is around 2 to 3 PM, then remained steady but elevated throughout the evening  We changed his insulin to carb ratio at lunch from an 8 to a 7, and left his ratio of the same at dinner  He is happy with these changes and content without making more at this time  He will continue to use his OmniPod and will be in to see the endocrinologist in a couple months  He has had a few hiccups, has knocked off to pods, but has found that his thigh and his abdomen tend to be a good place to use and he will primarily use those locations while moving around his is active spots  He will call with more questions or for more education as needed  Ht Readings from Last 1 Encounters:   04/05/23 5' 10\" (1 778 m)     Wt Readings from Last 3 Encounters:   04/05/23 86 2 kg (190 lb)   03/28/23 86 2 kg (190 lb)   03/17/23 89 4 kg (197 lb)        Body mass index is 27 26 kg/m²       Lab Results   Component Value Date    HGBA1C 7 5 07/29/2022    HGBA1C 11 6 12/09/2021    HGBA1C 10 6 09/13/2021       No results found for: CHOL  No results found for: HDL  No results found for: [address]  No results found for: TRIG  No results found for: CHOLHDL  No results found for: Sabrina García    Diabetes Education Record  [place] [facility] received " the following handouts: copy of download      Patient response to instruction    Comprehensiongood  Motivationgood  Expected Compliancegood    Thank you for referring your patient to [address], it was a pleasure working with them today  Please feel free to call with any questions or concerns      Filiberto Cheadle, [address] [place] [address] 20  29 [facility] [id number]

## 2023-04-07 ENCOUNTER — TELEPHONE (OUTPATIENT)
Dept: ENDOCRINOLOGY | Facility: HOSPITAL | Age: 73
End: 2023-04-07

## 2023-04-14 NOTE — TELEPHONE ENCOUNTER
I did call the patient back and was able to make him aware of the provider's recommendations and he will be coming into the office to change the settings on his pump with Matthew Matthews 4/19 at 315

## 2023-05-03 NOTE — TELEPHONE ENCOUNTER
Requested medication(s) are due for refill today: Yes  Patient has already received a courtesy refill: No  Other reason request has been forwarded to provider: failed protocol
Right arm;

## 2023-05-10 DIAGNOSIS — Z79.4 TYPE 2 DIABETES MELLITUS WITH HYPERGLYCEMIA, WITH LONG-TERM CURRENT USE OF INSULIN (HCC): ICD-10-CM

## 2023-05-10 DIAGNOSIS — E11.65 TYPE 2 DIABETES MELLITUS WITH HYPERGLYCEMIA, WITH LONG-TERM CURRENT USE OF INSULIN (HCC): ICD-10-CM

## 2023-05-12 RX ORDER — INSULIN ASPART 100 [IU]/ML
INJECTION, SOLUTION INTRAVENOUS; SUBCUTANEOUS
Qty: 30 ML | Refills: 1 | Status: SHIPPED | OUTPATIENT
Start: 2023-05-12

## 2023-06-13 LAB — HBA1C MFR BLD HPLC: 7.4 %

## 2023-06-13 PROCEDURE — 3051F HG A1C>EQUAL 7.0%<8.0%: CPT | Performed by: NURSE PRACTITIONER

## 2023-06-14 RX ORDER — NIFEDIPINE 60 MG/1
60 TABLET, EXTENDED RELEASE ORAL DAILY
COMMUNITY
Start: 2023-04-13

## 2023-06-14 RX ORDER — VALACYCLOVIR HYDROCHLORIDE 1 G/1
1000 TABLET, FILM COATED ORAL 3 TIMES DAILY
COMMUNITY
Start: 2023-05-30

## 2023-06-15 ENCOUNTER — OFFICE VISIT (OUTPATIENT)
Dept: ENDOCRINOLOGY | Facility: HOSPITAL | Age: 73
End: 2023-06-15
Payer: COMMERCIAL

## 2023-06-15 VITALS
DIASTOLIC BLOOD PRESSURE: 84 MMHG | HEIGHT: 70 IN | HEART RATE: 80 BPM | BODY MASS INDEX: 27.26 KG/M2 | SYSTOLIC BLOOD PRESSURE: 128 MMHG

## 2023-06-15 DIAGNOSIS — N18.30 STAGE 3 CHRONIC KIDNEY DISEASE, UNSPECIFIED WHETHER STAGE 3A OR 3B CKD (HCC): ICD-10-CM

## 2023-06-15 DIAGNOSIS — E11.65 TYPE 2 DIABETES MELLITUS WITH HYPERGLYCEMIA, WITH LONG-TERM CURRENT USE OF INSULIN (HCC): ICD-10-CM

## 2023-06-15 DIAGNOSIS — E78.2 HYPERCHOLESTEROLEMIA WITH HYPERTRIGLYCERIDEMIA: ICD-10-CM

## 2023-06-15 DIAGNOSIS — Z79.4 TYPE 2 DIABETES MELLITUS WITH HYPERGLYCEMIA, WITH LONG-TERM CURRENT USE OF INSULIN (HCC): ICD-10-CM

## 2023-06-15 DIAGNOSIS — Z12.11 ENCOUNTER FOR SCREENING COLONOSCOPY: Primary | ICD-10-CM

## 2023-06-15 DIAGNOSIS — E11.42 DIABETIC POLYNEUROPATHY ASSOCIATED WITH TYPE 2 DIABETES MELLITUS (HCC): ICD-10-CM

## 2023-06-15 DIAGNOSIS — I10 PRIMARY HYPERTENSION: ICD-10-CM

## 2023-06-15 PROCEDURE — 95251 CONT GLUC MNTR ANALYSIS I&R: CPT | Performed by: NURSE PRACTITIONER

## 2023-06-15 PROCEDURE — 99214 OFFICE O/P EST MOD 30 MIN: CPT | Performed by: NURSE PRACTITIONER

## 2023-06-15 RX ORDER — INSULIN ASPART 100 [IU]/ML
INJECTION, SOLUTION INTRAVENOUS; SUBCUTANEOUS
Qty: 30 ML | Refills: 3 | Status: SHIPPED | OUTPATIENT
Start: 2023-06-15

## 2023-06-15 RX ORDER — INSULIN GLARGINE 100 [IU]/ML
22 INJECTION, SOLUTION SUBCUTANEOUS
Qty: 15 ML | Refills: 2 | Status: SHIPPED | OUTPATIENT
Start: 2023-06-15

## 2023-06-15 NOTE — PATIENT INSTRUCTIONS
Be mindful of diet  Stay active and stay hydrated  As discussed, discontinue Omnipod  Continue NovoLog 14 units with breakfast and lunch  NovoLog 12 units at dinner with sliding scale as follows at meals:    200-250 = 2 units  251-300 = 4 units  301-350 = 6 units  351-400 = 8 units  401-450 = 10 units     Upgrade to [address] [place] [address],[address]  Send a report to the office in 2 weeks for review  Call with any concerns or questions  Follow up in 3 months with lab work

## 2023-06-15 NOTE — PROGRESS NOTES
Jacob Hernandez 67 y o  male MRN: [id number]    Encounter: [id number]      Assessment/Plan     Assessment: This is a 67y o -year-old male with type 2 diabetes with neuropathy, hypertension and hyperlipidemia  Plan:  1  Type 2 diabetes, insulin requiring:  Most recent hemoglobin A1c is 7 4  He does not like his experience with the Omnipod and felt his control was lacking  He would like to resume basal/bolus therapy  He will restart Novolog 14 units with breakfast and lunch with 12 units at dinner  He will restart Lantus 22 units  Continue to utilize Dexcom to monitor glucose levels  Contact the office with any concerns or questions  Check hemoglobin A1c prior to next visit      2  Diabetic neuropathy:  Diabetic foot exam performed at previous office visit      3  Hypertension:  Normotensive in the office  Continue current regimen  Check comprehensive metabolic panel prior to next visit      4  Hyperlipidemia:  Triglycerides remain elevated  Continue current regimen     Will continue to monitor over time        CC: Type 2 Diabetes follow up    History of Present Illness     HPI:  67year old male with type 2 diabetes, insulin requiring for 20 years with hypertension and hyperlipidemia of hypertriglyceridemia for follow up   He was diagnosed with diabetes about 20 years ago  He has started Omnipod therapy since his last visit   Most recent hemoglobin A1c from June 13, 2023 was 7 4  Sujit Carballo is status post right hip replacement from November 8, 2022   He denies any polyuria, polydipsia, polyphagia, and blurry vision   He has once or twice a night nocturia  Sujit Carballo has a bit of numbness of the feet and will feel unsteady at times and has fallen multiple times  Dannykatie Carballo denies chest pain or shortness of breath   He denies nephropathy, retinopathy, heart attack, stroke and claudication but does admit to neuropathy, coronary artery disease and TIAs  Initial concern was a TIA, but was diagnosed with Bell palsy   He is S/P right sided total hip replacement from November 2022           Hypoglycemic episodes: After dinner at times recently    H/o of hypoglycemia causing hospitalization or intervention such as glucagon injection  or ambulance call  No   Hypoglycemia symptoms: jitteriness, sweating and lightheaded  Treatment of hypoglycemia: glucose packets  Glucagon:No   Medic alert tag: recommended,Yes       The patient's last eye exam was in November 2021 with no retinopathy   The patient's last foot exam was performed at his office visit on December 2, 2021       Blood Sugar/Glucometer/Pump/CGM review:  Download of Dexcom from June 2 through Fidelina 15, 2023 reveals an average glucose level of 169 with the standard deviation of 52   He is in target range 64% time, below target range <1% time, above target range 35% time      He has hypertension and takes losartan 60 JZ DRGEQ and Bystolic 11 VR JWTQI   He denies headache or stroke-like symptoms      He has hyperlipidemia with significant hypertriglyceridemia as high as 2400 and takes simvastatin 20 mg daily, Zetia 10 mg daily, and Vascepa 1 g - 4 times a day   He denies chest pain or shortness of breath  Review of Systems   Constitutional: Negative  Negative for chills, fatigue and fever  HENT: Negative  Negative for trouble swallowing and voice change  Eyes: Negative for photophobia, pain, discharge, redness, itching and visual disturbance  Respiratory: Negative for cough and shortness of breath  Cardiovascular: Negative for chest pain and palpitations  Gastrointestinal: Negative for abdominal pain, constipation, diarrhea, nausea and vomiting  Endocrine: Negative for cold intolerance, heat intolerance, polydipsia, polyphagia and polyuria  Genitourinary: Negative  Musculoskeletal: Negative  Skin: Negative  Allergic/Immunologic: Negative  Neurological: Negative for dizziness, syncope, light-headedness and headaches  Hematological: Negative  Psychiatric/Behavioral: Negative  All other systems reviewed and are negative  Historical Information   Past Medical History:   Diagnosis Date   • Arthritis    • Cervical disc disease     C2-7, needs repair   • Coronary artery disease     angina is pain in throat   • 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   • Hypertension    • TIA (transient ischemic attack)     X 2     Past Surgical History:   Procedure Laterality Date   • ANKLE ARTHROSCOPY Bilateral    • APPENDECTOMY     • CATARACT EXTRACTION, BILATERAL Bilateral    • CHOLECYSTECTOMY     • CORONARY ANGIOPLASTY WITH STENT PLACEMENT      2 stents in LAD   • ELBOW ARTHROSCOPY Bilateral     with right elbow tear repaired   • ERCP     • KNEE ARTHROSCOPY Right    • LUMBAR LAMINECTOMY      5 times   • MULTIPLE TOOTH EXTRACTIONS     • NOSE SURGERY  2021    fracture post a fall   • [place] ARTHRP ACETBLR/PROX FEM PROSTC AGRFT/ALGRFT Right 11/9/2022    Procedure: ARTHROPLASTY HIP TOTAL ANTERIOR,NAVIGATED- SAME DAY;   Surgeon: Basilio Dhillon DO;  Location: [facility] OR;  Service: Orthopedics   • REPLACEMENT TOTAL KNEE Left    • SINUS SURGERY     • THUMB FUSION Bilateral     thumb repair with bone grafts   • TONSILLECTOMY AND ADENOIDECTOMY       Social History   Social History     Substance and Sexual Activity   Alcohol Use Yes    Comment: Maybe 1 drink every 3-4 weeks     Social History     Substance and Sexual Activity   Drug Use Never     Social History     Tobacco Use   Smoking Status Former   • Packs/day: 1 50   • Years: 25 00   • Total pack years: 37 50   • Types: Cigarettes   Smokeless Tobacco Never     Family History:   Family History   Problem Relation Age of Onset   • Uterine cancer Mother    • Colon cancer Father    • Cancer Sister         corneal   • No Known Problems Brother        Meds/Allergies   Current Outpatient Medications   Medication Sig Dispense Refill   • allopurinol (ZYLOPRIM) 100 mg tablet Take 100 mg by mouth daily     • "allopurinol (ZYLOPRIM) 300 mg tablet Take 300 mg by mouth daily     • amitriptyline (ELAVIL) 50 mg tablet Take 1 tablet (50 mg total) by mouth daily at bedtime 90 tablet 1   • aspirin (ECOTRIN LOW STRENGTH) 81 mg EC tablet Take 1 tablet (81 mg total) by mouth 2 (two) times a day 60 tablet 0   • atorvastatin (LIPITOR) 40 mg tablet Take 40 mg by mouth daily     • Blood Glucose Monitoring Suppl (Accu-Chek Guide Me) w/Device KIT Check blood sugars 3 times daily 1 kit 0   • ezetimibe (ZETIA) 10 mg tablet Take 10 mg by mouth daily     • HYDROcodone-acetaminophen (NORCO) 5-325 mg per tablet      • insulin aspart (NovoLOG FlexPen) 100 UNIT/ML injection pen INJECT 14 UNITS SUBCUTANEOUSLY 3 TIMES DAILY WITH MEALS 30 mL 1   • Insulin Disposable Pump (Omnipod 5 G6 Pod, Gen 5,) MISC Change every 72 hours  10 each 3   • Klor-Con M20 20 MEQ tablet Take 20 mEq by mouth daily       • Lantus SoloStar 100 units/mL SOPN INJECT 30 UNITS UNDER THE SKIN DAILY AT BEDTIME 15 mL 2   • Magnesium 400 MG TABS Take by mouth daily       • Multiple Vitamins-Minerals (Multivitamin Men 50+) TABS Take by mouth daily     • nebivolol (BYSTOLIC) 2 5 mg tablet EVERY NIGHT TAKE 1 TABLET BY MOUTH EVERY DAY     • NIFEdipine (PROCARDIA XL) 30 mg 24 hr tablet Take 60 mg by mouth every evening     • NIFEdipine (PROCARDIA XL) 60 mg 24 hr tablet Take 60 mg by mouth daily     • NovoLOG 100 UNIT/ML injection Up to 80 units per day via pump 10 mL 3   • valACYclovir (VALTREX) 1,000 mg tablet Take 1,000 mg by mouth 3 (three) times a day       No current facility-administered medications for this visit  Allergies   Allergen Reactions   • Mushroom Extract Complex - Food Allergy Anaphylaxis   • Nitroglycerin Vomiting and Headache       Objective   Vitals: Blood pressure 128/84, pulse 80, height 5' 10\" (1 778 m)  Physical Exam  Vitals reviewed  Constitutional:       Appearance: He is well-developed  HENT:      Head: Normocephalic and atraumatic        Nose: " Nose normal    Eyes:      Conjunctiva/sclera: Conjunctivae normal       Pupils: Pupils are equal, round, and reactive to light  Comments: Wears glasses   Cardiovascular:      Rate and Rhythm: Normal rate and regular rhythm  Heart sounds: Normal heart sounds  Pulmonary:      Effort: Pulmonary effort is normal       Breath sounds: Normal breath sounds  Abdominal:      General: Bowel sounds are normal       Palpations: Abdomen is soft  Musculoskeletal:         General: Normal range of motion  Cervical back: Normal range of motion and neck supple  Skin:     General: Skin is warm and dry  Neurological:      Mental Status: He is alert and oriented to person, place, and time  Psychiatric:         Behavior: Behavior normal          Thought Content:  Thought content normal          Judgment: Judgment normal        Lab Results:   Lab Results   Component Value Date/Time    Albumin 3 7 11/09/2022 11:30 AM    Albumin 4 1 11/09/2022 06:42 AM     (H) 11/09/2022 11:30 AM     (H) 11/09/2022 06:42 AM     (H) 11/09/2022 11:30 AM    AST 45 11/09/2022 06:42 AM    BUN 31 (H) 11/10/2022 02:23 AM    BUN 27 (H) 11/09/2022 11:30 AM    BUN 25 11/09/2022 06:42 AM    Chloride 100 11/10/2022 02:23 AM    Chloride 103 11/09/2022 11:30 AM    Chloride 102 11/09/2022 06:42 AM    CO2 28 11/10/2022 02:23 AM    CO2 25 11/09/2022 11:30 AM    CO2 25 11/09/2022 06:42 AM    Creatinine 1 99 (H) 11/10/2022 02:23 AM    Creatinine 1 95 (H) 11/09/2022 11:30 AM    Creatinine 1 63 (H) 11/09/2022 06:42 AM    Hematocrit 27 9 (L) 11/10/2022 02:23 AM    Hematocrit 31 7 (L) 11/09/2022 11:30 AM    Hematocrit 39 0 11/09/2022 06:42 AM    Hemoglobin 9 4 (L) 11/10/2022 02:23 AM    Hemoglobin 10 6 (L) 11/09/2022 11:30 AM    Hemoglobin 13 2 11/09/2022 06:42 AM    Hemoglobin A1C 7 5 07/29/2022 12:00 AM    Potassium 4 2 11/10/2022 02:23 AM    Potassium 4 5 11/09/2022 11:30 AM    Potassium 3 9 11/09/2022 06:42 AM    MCV 95 "11/10/2022 02:23 AM    MCV 94 11/09/2022 11:30 AM    MCV 94 11/09/2022 06:42 AM    Platelets 768 09/07/5741 02:23 AM    Platelets 791 69/33/1606 11:30 AM    Platelets 100 50/99/3261 06:42 AM    Total Protein 6 8 11/09/2022 11:30 AM    Total Protein 8 0 11/09/2022 06:42 AM    WBC 11 59 (H) 11/10/2022 02:23 AM    WBC 12 15 (H) 11/09/2022 11:30 AM    WBC 7 99 11/09/2022 06:42 AM     Portions of the record may have been created with voice recognition software  Occasional wrong word or \"sound a like\" substitutions may have occurred due to the inherent limitations of voice recognition software  Read the chart carefully and recognize, using context, where substitutions have occurred    "

## 2023-06-20 ENCOUNTER — OFFICE VISIT (OUTPATIENT)
Dept: PODIATRY | Facility: CLINIC | Age: 73
End: 2023-06-20
Payer: COMMERCIAL

## 2023-06-20 VITALS
SYSTOLIC BLOOD PRESSURE: 100 MMHG | HEIGHT: 70 IN | WEIGHT: 190 LBS | HEART RATE: 80 BPM | DIASTOLIC BLOOD PRESSURE: 61 MMHG | BODY MASS INDEX: 27.2 KG/M2

## 2023-06-20 DIAGNOSIS — B35.1 ONYCHOMYCOSIS: Primary | ICD-10-CM

## 2023-06-20 DIAGNOSIS — Z79.4 TYPE 2 DIABETES MELLITUS WITH DIABETIC NEUROPATHY, WITH LONG-TERM CURRENT USE OF INSULIN (HCC): ICD-10-CM

## 2023-06-20 DIAGNOSIS — E11.40 TYPE 2 DIABETES MELLITUS WITH DIABETIC NEUROPATHY, WITH LONG-TERM CURRENT USE OF INSULIN (HCC): ICD-10-CM

## 2023-06-20 PROCEDURE — 11721 DEBRIDE NAIL 6 OR MORE: CPT | Performed by: PODIATRIST

## 2023-06-20 NOTE — PROGRESS NOTES
PATIENT:  Ayde Sorensen  1950    ASSESSMENT:  1. Onychomycosis        2. Type 2 diabetes mellitus with diabetic neuropathy, with long-term current use of insulin (HCC)              No orders of the defined types were placed in this encounter. PLAN:  Disease prevention and related risk factors of diabetes were identified and discussed. The patient was educated in proper foot wear for diabetics. Educated in daily foot assessment and routine diabetic foot care. Discussed the importance of controlling BS through diet and exercise. The patient will follow up in 9-12 weeks for further diabetic foot exam and care. Procedures: 67389  All mycotic toenails were reduced and debrided in length, width, and girth using a nail nipper and electric dremel. All hyperkeratotic skin lesion(s) if present were sharply pared with a #10 scalpel with no evidence of ulceration/abscess. Patient tolerated procedure(s) well without complications. Procedures     HPI:  Ayde Sorensen is a 67 y. o.year old male seen for diabetic foot exam.  Patient has class findings with type II DM. BS is under control. Patient concerned of thick toenails . The patient denied any acute pedal disorder, redness, acute swelling, or recent injury.       The following portions of the patient's history were reviewed and updated as appropriate: allergies, current medications, past family history, past medical history, past social history, past surgical history and problem list.    REVIEW OF SYSTEMS:  GENERAL: No fever or chills  HEART: No chest pain, or palpitation  RESPIRATORY:  No acute SOB or cough  GI: No Nausea, vomit or diarrhea  NEUROLOGIC: No syncope or acute weakness    PHYSICAL EXAM:    /61   Pulse 80   Ht 5' 10" (1.778 m)   Wt 86.2 kg (190 lb)   BMI 27.26 kg/m²     VASCULAR EXAM:  Posterior tibial artery +1 bilateral  Dorsalis pedis artery absent bilateral  The patient has moderate skin atrophy, color changes, lack of digital hair, and nail dystrophy. There is trace lower extremity edema bilaterally. NEUROLOGIC EXAM:  Sensation is intact to light touch. Yes   Sensation is diminished to 10gm monofilament. Vibratory sensation diminished. Tingling/numb paresthesias noted bilateral.         DERMATOLOGIC EXAM:   Texture, Tone and Turgor are diminished bilateral.    The patient has dystrophic/hypertrophic toenails with yellow/white discoloration, onycholysis, and subungal debris. Fungal odor noted. Brittle nature noted. Right foot nails severely dystrophic x5 with 0.5 cm ave thickness ()  Left foot nails severely dystrophic x5 with 0.4 cm ave thickness ()    Patient has hyperkeratotic lesions noted:  Right foot located at none. Left foot located at    No notable suspicious skin lesions. MUSCULOSKELETAL EXAM:   No acute joint pain, edema, or redness. Denies any acute musculoskeletal problem. Patient has no gross deformities. Patient has no ambulation limitations. Patient wears DM shoes? No    Risk Category/Class Findings:  Q8(B2 ABC, B3)  0 = No loss of protective sensation    A1)  Has the patient had a previous amputation of the foot or integral skeletal portion thereof? No  B1)  Does the patient have absent posterior tibial pulse? No  B3)  Does the patient have absent dorsalis pedis? Yes  B2)  Does the patient have three of the following? Yes           1. Hair growth (increased or decreased), 2.  Nail changes (thickening) and 3. Pigmentary changes (discoloring)  C)  Does the patient have two of the following and one above? No            4.   Paraesthesias (abnormal spontaneous sensations in the feet)

## 2023-06-29 ENCOUNTER — CONSULT (OUTPATIENT)
Dept: NEPHROLOGY | Facility: HOSPITAL | Age: 73
End: 2023-06-29
Attending: INTERNAL MEDICINE
Payer: COMMERCIAL

## 2023-06-29 VITALS
BODY MASS INDEX: 28.49 KG/M2 | HEIGHT: 70 IN | DIASTOLIC BLOOD PRESSURE: 88 MMHG | WEIGHT: 199 LBS | SYSTOLIC BLOOD PRESSURE: 160 MMHG

## 2023-06-29 DIAGNOSIS — M10.00 IDIOPATHIC GOUT, UNSPECIFIED CHRONICITY, UNSPECIFIED SITE: ICD-10-CM

## 2023-06-29 DIAGNOSIS — E11.65 TYPE 2 DIABETES MELLITUS WITH HYPERGLYCEMIA, WITH LONG-TERM CURRENT USE OF INSULIN (HCC): ICD-10-CM

## 2023-06-29 DIAGNOSIS — R80.9 MICROALBUMINURIA: ICD-10-CM

## 2023-06-29 DIAGNOSIS — Z79.4 TYPE 2 DIABETES MELLITUS WITH HYPERGLYCEMIA, WITH LONG-TERM CURRENT USE OF INSULIN (HCC): ICD-10-CM

## 2023-06-29 DIAGNOSIS — N18.32 STAGE 3B CHRONIC KIDNEY DISEASE (HCC): Primary | ICD-10-CM

## 2023-06-29 DIAGNOSIS — I10 PRIMARY HYPERTENSION: ICD-10-CM

## 2023-06-29 PROBLEM — G45.9 TIA (TRANSIENT ISCHEMIC ATTACK): Status: RESOLVED | Noted: 2023-06-29 | Resolved: 2023-06-29

## 2023-06-29 PROBLEM — G45.9 TIA (TRANSIENT ISCHEMIC ATTACK): Status: ACTIVE | Noted: 2023-06-29

## 2023-06-29 LAB
SL AMB  POCT GLUCOSE, UA: ABNORMAL
SL AMB POCT BILIRUBIN,UA: ABNORMAL
SL AMB POCT BLOOD,UA: ABNORMAL
SL AMB POCT CLARITY,UA: CLEAR
SL AMB POCT COLOR,UA: YELLOW
SL AMB POCT KETONES,UA: ABNORMAL
SL AMB POCT NITRITE,UA: ABNORMAL
SL AMB POCT PH,UA: 6
SL AMB POCT SPECIFIC GRAVITY,UA: 1.01
SL AMB POCT URINE PROTEIN: ABNORMAL
SL AMB POCT UROBILINOGEN: 0.2

## 2023-06-29 NOTE — PATIENT INSTRUCTIONS
1  Elevated sCr likely d/t CKD stage 3b in setting of diabetes and hypertension  -b/l sCr unclear  -sCr 2 1 in Aug  2021, with sCr 1 9 as of Nov 2022, sCr 1 67 as of 6/13/23 which correlates with eGFR 43ml/min  -sCr previously 1 3-1 5 in [id number]  -in office urine sample shows trace protein, no blood  -prior UA with +glucose, high leukocytes, 1+ protein, microalb:Cr 725mg/g  -obtain renal ultrasound with PVR as well as urine studies and repeat BMP  -sugar and blood pressure control will slow progression of CKD    2  HTN - BP elevated in office  -monitor BP twice daily  Do not check BP right after waking up as it tends to run higher in everyone then  Call office ([phone]) in 1 week with readings  -BP log provided  -on nebivolol 2 5mg daily, nifedipine 60mg in the evening  -avoid high salt/sodium diet: avoid canned food, packaged food, frequent [place] food, frequent fast food/restaurant food  -avoid caffeine     3  DM type 1/2, well controlled - last A1C 7 4 as of 6/13/23, on insulin    4  Anemia - Hgb 12 6, monitor CBC, last iron sat low at 17%    5  History of gout - on allopurinol 400mg daily, monitor uric acid  -recommend taking 300mg daily of allopurinol instead    6  Calcium oxalate stones   -encourage hydration  Drink enough to urinate 2-2 5L/day to prevent stone formation  -avoid high salt/sodium diet  -recommend litholink(24 hr urine collection)-bottle mailed to you    7  On chronic potassium supplementation per cardiology recommendation  Not on diuretics currently  RTC in 4 months

## 2023-06-29 NOTE — PROGRESS NOTES
NEPHROLOGY OUTPATIENT CONSULTATION   Lady Knutson 67 y o  male MRN: [id number]  Date: 6/29/2023  Reason for consultation:   Chief Complaint   Patient presents with   • Chronic Kidney Disease   • Consult       Patient instructions:  Patient Instructions   1  Elevated sCr likely d/t CKD stage 3b in setting of diabetes and hypertension  -b/l sCr unclear  -sCr 2 1 in Aug  2021, with sCr 1 9 as of Nov 2022, sCr 1 67 as of 6/13/23 which correlates with eGFR 43ml/min  -sCr previously 1 3-1 5 in [id number]  -in office urine sample shows trace protein, no blood  -prior UA with +glucose, high leukocytes, 1+ protein, microalb:Cr 725mg/g  -obtain renal ultrasound with PVR as well as urine studies and repeat BMP  -sugar and blood pressure control will slow progression of CKD    2  HTN - BP elevated in office  -monitor BP twice daily  Do not check BP right after waking up as it tends to run higher in everyone then  Call office ([phone]) in 1 week with readings  -BP log provided  -on nebivolol 2 5mg daily, nifedipine 60mg in the evening  -avoid high salt/sodium diet: avoid canned food, packaged food, frequent [place] food, frequent fast food/restaurant food  -avoid caffeine     3  DM type 1/2, well controlled - last A1C 7 4 as of 6/13/23, on insulin    4  Anemia - Hgb 12 6, monitor CBC, last iron sat low at 17%    5  History of gout - on allopurinol 400mg daily, monitor uric acid  -recommend taking 300mg daily of allopurinol instead    6  Calcium oxalate stones   -encourage hydration  Drink enough to urinate 2-2 5L/day to prevent stone formation  -avoid high salt/sodium diet  -recommend litholink(24 hr urine collection)-bottle mailed to you    7  On chronic potassium supplementation per cardiology recommendation  Not on diuretics currently  RTC in 4 months  Marco Antonio Burrell was seen today for chronic kidney disease and consult      Diagnoses and all orders for this visit:    Stage 3b chronic kidney disease ([facility] [address] )  - Ambulatory Referral to Nephrology  -     POCT urine dip  -     Basic metabolic panel; Future  -     Urinalysis with microscopic; Future  -     Protein / creatinine ratio, urine; Future  -     Albumin / creatinine urine ratio; Future  -     Uric acid; Future  -     US kidney and bladder with pvr; Future  -     Phosphorus; Future  -     Magnesium; Future  -     PTH, intact; Future  -     Vitamin D 25 hydroxy; Future    Microalbuminuria  -     Ambulatory Referral to Nephrology  -     POCT urine dip  -     Urinalysis with microscopic; Future  -     Protein / creatinine ratio, urine; Future  -     Albumin / creatinine urine ratio; Future    Type 2 diabetes mellitus with hyperglycemia, with long-term current use of insulin ([facility])    Idiopathic gout, unspecified chronicity, unspecified site  -     Uric acid; Future    Primary hypertension        ASSESSMENT and PLAN:  1  Elevated sCr likely d/t CKD stage 3b in setting of diabetes and hypertension  -b/l sCr unclear  -sCr 2 1 in Aug  2021, with sCr 1 9 as of Nov 2022, sCr 1 67 as of 6/13/23 which correlates with eGFR 43ml/min  -sCr previously 1 3-1 5 in [id number]  -in office urine sample shows trace protein, no blood  -prior UA with +glucose, high leukocytes, 1+ protein, microalb:Cr 725mg/g  -obtain renal ultrasound with PVR  -sugar and blood pressure control will slow progression of CKD    2  HTN - BP elevated in office  -monitor BP twice daily  Do not check BP right after waking up as it tends to run higher in everyone then  Call office ([phone]) in 1 week with readings  -BP log provided  -on nebivolol 2 5mg daily, nifedipine 60mg in the evening  -avoid high salt/sodium diet: avoid canned food, packaged food, frequent [place] food, frequent fast food/restaurant food  -avoid caffeine     3  DM type 1/2, well controlled - last A1C 7 4 as of 6/13/23, on insulin    4  Anemia - Hgb 12 6, monitor CBC, last iron sat low at 17%    5   History of gout - on allopurinol 400mg daily, monitor uric acid  -recommend taking 300mg daily of allopurinol instead    6  Calcium oxalate stones   -encourage hydration  Drink enough to urinate 2-2 5L/day to prevent stone formation  -avoid high salt/sodium diet  -recommend litholink(24 hr urine collection)    7  On chronic potassium supplementation per cardiology recommendation  Not on diuretics currently  HISTORY OF PRESENT ILLNESS:  Requesting Physician: Stacie Montanez DO    Patricia Diver is a 67 y o  male with history of CAD, HTN who presents in consultation for CKD  Denies history of recent NSAID use, herbal/OTC medications, history of UTIs  Denies history of prior known kidney disease  HTN x 1-2 years, never very uncontrolled, BP at home lower in the afternoons and higher first thing in the morning, but never over 635 systolic  Has  Cup of coffee in the mornings  Sleeps well  Previously on celebrex  Has not taken this in years  Gout - avoids gravy  avoids beer and seafood    Nephrolithiasis - had two episodes, was started on allopurinol at that time  Does sometimes pee out gravel  Has had lithotripsy and water bath 15-20 years ago  Has been a year since last stone  DM type 2 x 20 years - had been very uncontrolled in the past, A1C 13 8 as of April 2021  Using dexcom, follows with endocrinology  To see eye doctor  Has neuropathy in feet  Had shingles 3 weeks ago  Did not have blistering but had rash as caught early and has now resolved  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[employer] [facility]      PAST MEDICAL HISTORY:  Past Medical History:   Diagnosis Date   • Arthritis    • Cervical disc disease     C2-7, needs repair   • Coronary artery disease     angina is pain in throat   • 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   • Hypertension    • Kidney stone        PAST SURGICAL HISTORY:  Past Surgical History:   Procedure Laterality Date   • ANKLE ARTHROSCOPY Bilateral    • APPENDECTOMY     • CATARACT EXTRACTION, BILATERAL Bilateral    • CHOLECYSTECTOMY     • CORONARY ANGIOPLASTY WITH STENT PLACEMENT      2 stents in LAD   • ELBOW ARTHROSCOPY Bilateral     with right elbow tear repaired   • ERCP     • KNEE ARTHROSCOPY Right    • LITHOTRIPSY     • LUMBAR LAMINECTOMY      5 times   • MULTIPLE TOOTH EXTRACTIONS     • NOSE SURGERY  2021    fracture post a fall   • OK ARTHRP ACETBLR/PROX FEM PROSTC AGRFT/ALGRFT Right 11/09/2022    Procedure: ARTHROPLASTY HIP TOTAL ANTERIOR,NAVIGATED- SAME DAY;   Surgeon: George Field DO;  Location:  MAIN OR;  Service: Orthopedics   • REPLACEMENT TOTAL KNEE Left    • SINUS SURGERY     • THUMB FUSION Bilateral     thumb repair with bone grafts   • TONSILLECTOMY AND ADENOIDECTOMY         ALLERGIES:  Allergies   Allergen Reactions   • Mushroom Extract Complex - Food Allergy Anaphylaxis   • Nitroglycerin Vomiting and Headache       SOCIAL HISTORY:  Social History     Substance and Sexual Activity   Alcohol Use Yes    Comment: Maybe 1 drink every 3-4 weeks     Social History     Substance and Sexual Activity   Drug Use Never     Social History     Tobacco Use   Smoking Status Former   • Packs/day: 1 50   • Years: 25 00   • Total pack years: 37 50   • Types: Cigarettes   Smokeless Tobacco Never   Tobacco Comments    Quit at age 32       FAMILY HISTORY:  Family History   Problem Relation Age of Onset   • Uterine cancer Mother    • Colon cancer Father    • Cancer Sister         corneal   • No Known Problems Brother        MEDICATIONS:    Current Outpatient Medications:   •  allopurinol (ZYLOPRIM) 300 mg tablet, Take 300 mg by mouth daily, Disp: , Rfl:   •  amitriptyline (ELAVIL) 50 mg tablet, Take 1 tablet (50 mg total) by mouth daily at bedtime, Disp: 90 tablet, Rfl: 1  •  aspirin (ECOTRIN LOW STRENGTH) 81 mg EC tablet, Take 1 tablet (81 mg total) by mouth 2 (two) times a day, Disp: 60 tablet, Rfl: 0  •  atorvastatin (LIPITOR) 40 mg tablet, Take 40 mg by mouth daily, Disp: , Rfl:   •  Blood Glucose Monitoring Suppl (Accu-Chek Guide Me) w/Device KIT, Check blood sugars 3 times daily, Disp: 1 kit, Rfl: 0  •  ezetimibe (ZETIA) 10 mg tablet, Take 10 mg by mouth daily, Disp: , Rfl:   •  HYDROcodone-acetaminophen (NORCO) 5-325 mg per tablet, , Disp: , Rfl:   •  insulin aspart (NovoLOG FlexPen) 100 UNIT/ML injection pen, INJECT 14 UNITS SUBCUTANEOUSLY WITH BREAKFAST AND LUNCH WITH 12 UNITS WITH DINNER, Disp: 30 mL, Rfl: 3  •  Klor-Con M20 20 MEQ tablet, Take 20 mEq by mouth daily  , Disp: , Rfl:   •  Lantus SoloStar 100 units/mL SOPN, Inject 0 22 mL (22 Units total) under the skin daily at bedtime, Disp: 15 mL, Rfl: 2  •  Magnesium 400 MG TABS, Take by mouth daily  , Disp: , Rfl:   •  Multiple Vitamins-Minerals (Multivitamin Men 50+) TABS, Take by mouth daily, Disp: , Rfl:   •  nebivolol (BYSTOLIC) 2 5 mg tablet, EVERY NIGHT TAKE 1 TABLET BY MOUTH EVERY DAY, Disp: , Rfl:   •  NIFEdipine (PROCARDIA XL) 60 mg 24 hr tablet, Take 60 mg by mouth daily, Disp: , Rfl:   •  valACYclovir (VALTREX) 1,000 mg tablet, Take 1,000 mg by mouth 3 (three) times a day, Disp: , Rfl:   •  Insulin Disposable Pump (Omnipod 5 G6 Pod, Gen 5,) MISC, Change every 72 hours  (Patient not taking: Reported on 6/29/2023), Disp: 10 each, Rfl: 3  •  NovoLOG 100 UNIT/ML injection, Up to 80 units per day via pump (Patient not taking: Reported on 6/29/2023), Disp: 10 mL, Rfl: 3    REVIEW OF SYSTEMS:  Review of Systems   Constitutional: Negative for chills and fever  HENT: Negative for sore throat  Eyes: Negative for visual disturbance  Respiratory: Negative for cough and shortness of breath  Cardiovascular: Negative for chest pain and leg swelling  Gastrointestinal: Negative for abdominal pain, constipation, diarrhea, nausea and vomiting  Endocrine: Negative for polyuria  Genitourinary: Negative for decreased urine volume, difficulty urinating, dysuria and hematuria     Musculoskeletal: Positive for back pain (chronic from lumbar "laminectomies) and neck pain (cervical pain)  Negative for myalgias  Skin: Negative for rash  Neurological: Positive for numbness (in feet)  Negative for dizziness and light-headedness  Psychiatric/Behavioral: Negative for confusion  PHYSICAL EXAM:   Vitals:    06/29/23 1101   BP: 160/88   BP Location: Left arm   Patient Position: Sitting   Cuff Size: Large   Weight: 90 3 kg (199 lb)   Height: 5' 10\" (1 778 m)     Body mass index is 28 55 kg/m²  Physical Exam  Vitals reviewed  Constitutional:       General: He is not in acute distress  Appearance: He is well-developed  He is not diaphoretic  HENT:      Head: Normocephalic and atraumatic  Mouth/Throat:      Pharynx: No oropharyngeal exudate  Eyes:      General: No scleral icterus  Right eye: No discharge  Left eye: No discharge  Neck:      Thyroid: No thyromegaly  Cardiovascular:      Rate and Rhythm: Normal rate and regular rhythm  Heart sounds: Normal heart sounds  Pulmonary:      Effort: Pulmonary effort is normal       Breath sounds: Normal breath sounds  No wheezing or rales  Abdominal:      General: Bowel sounds are normal  There is no distension  Palpations: Abdomen is soft  Tenderness: There is no abdominal tenderness  Musculoskeletal:         General: Normal range of motion  Cervical back: Neck supple  Lymphadenopathy:      Cervical: No cervical adenopathy  Skin:     General: Skin is warm and dry  Findings: No rash  Neurological:      Mental Status: He is alert        Comments: awake   Psychiatric:         Behavior: Behavior normal            Laboratory results:   Lab Results   Component Value Date    SODIUM 135 11/10/2022    K 4 2 11/10/2022     11/10/2022    CO2 28 11/10/2022    BUN 31 (H) 11/10/2022    CREATININE 1 99 (H) 11/10/2022    GLUC 224 (H) 11/10/2022    CALCIUM 8 0 (L) 11/10/2022        Imaging: July 2008 CT abdomen: normal size kidneys with right punctate " "kidney stone and right lower pole cyst    Portions of the record may have been created with voice recognition software   Occasional wrong word or \"sound a like\" substitutions may have occurred due to the inherent limitations of voice recognition software   Read the chart carefully and recognize, using context, where substitutions have occurred    "

## 2023-07-20 LAB
ALBUMIN SERPL-MCNC: 4.7 G/DL (ref 3.8–4.8)
ALBUMIN/GLOB SERPL: 2.1 {RATIO} (ref 1.2–2.2)
ALP SERPL-CCNC: 99 IU/L (ref 44–121)
ALT SERPL-CCNC: 21 IU/L (ref 0–44)
AST SERPL-CCNC: 23 IU/L (ref 0–40)
BASOPHILS # BLD AUTO: 0.1 X10E3/UL (ref 0–0.2)
BASOPHILS NFR BLD AUTO: 2 %
BILIRUB SERPL-MCNC: 0.8 MG/DL (ref 0–1.2)
BUN SERPL-MCNC: 21 MG/DL (ref 8–27)
BUN/CREAT SERPL: 16 (ref 10–24)
CALCIUM SERPL-MCNC: 9.7 MG/DL (ref 8.6–10.2)
CHLORIDE SERPL-SCNC: 105 MMOL/L (ref 96–106)
CHOLEST SERPL-MCNC: 126 MG/DL (ref 100–199)
CO2 SERPL-SCNC: 26 MMOL/L (ref 20–29)
CREAT SERPL-MCNC: 1.28 MG/DL (ref 0.76–1.27)
EGFR: 59 ML/MIN/1.73
EOSINOPHIL # BLD AUTO: 0.2 X10E3/UL (ref 0–0.4)
EOSINOPHIL NFR BLD AUTO: 3 %
ERYTHROCYTE [DISTWIDTH] IN BLOOD BY AUTOMATED COUNT: 13.9 % (ref 11.6–15.4)
GLOBULIN SER-MCNC: 2.2 G/DL (ref 1.5–4.5)
GLUCOSE SERPL-MCNC: 86 MG/DL (ref 70–99)
HBA1C MFR BLD: 6.9 % (ref 4.8–5.6)
HCT VFR BLD AUTO: 38.5 % (ref 37.5–51)
HDLC SERPL-MCNC: 39 MG/DL
HGB BLD-MCNC: 13.2 G/DL (ref 13–17.7)
IMM GRANULOCYTES # BLD: 0 X10E3/UL (ref 0–0.1)
IMM GRANULOCYTES NFR BLD: 0 %
LDLC SERPL CALC-MCNC: 37 MG/DL (ref 0–99)
LYMPHOCYTES # BLD AUTO: 2.4 X10E3/UL (ref 0.7–3.1)
LYMPHOCYTES NFR BLD AUTO: 35 %
MCH RBC QN AUTO: 32.2 PG (ref 26.6–33)
MCHC RBC AUTO-ENTMCNC: 34.3 G/DL (ref 31.5–35.7)
MCV RBC AUTO: 94 FL (ref 79–97)
MONOCYTES # BLD AUTO: 0.5 X10E3/UL (ref 0.1–0.9)
MONOCYTES NFR BLD AUTO: 7 %
NEUTROPHILS # BLD AUTO: 3.7 X10E3/UL (ref 1.4–7)
NEUTROPHILS NFR BLD AUTO: 53 %
PLATELET # BLD AUTO: 278 X10E3/UL (ref 150–450)
POTASSIUM SERPL-SCNC: 4.3 MMOL/L (ref 3.5–5.2)
PROT SERPL-MCNC: 6.9 G/DL (ref 6–8.5)
RBC # BLD AUTO: 4.1 X10E6/UL (ref 4.14–5.8)
SL AMB VLDL CHOLESTEROL CALC: 50 MG/DL (ref 5–40)
SODIUM SERPL-SCNC: 146 MMOL/L (ref 134–144)
TRIGL SERPL-MCNC: 337 MG/DL (ref 0–149)
WBC # BLD AUTO: 6.9 X10E3/UL (ref 3.4–10.8)

## 2023-07-21 NOTE — RESULT ENCOUNTER NOTE
Please call the patient regarding abnormal result. Hemoglobin A1c has improved to 6.9. CBC looks okay. BUN and creatinine have improved on comprehensive metabolic panel. Triglycerides are elevated on fasting lipid panel.

## 2023-08-16 ENCOUNTER — TELEPHONE (OUTPATIENT)
Age: 73
End: 2023-08-16

## 2023-08-16 NOTE — TELEPHONE ENCOUNTER
Caller: Neel Arellano    Doctor: Ej Price DPM    Reason for call: Anitra Gorman has a new wound and he is diabetic. It is on right second toe. I have no appointments to give him. Can someone please schedule an appointment for him and call him. Thank you!     Call back#: [phone]

## 2023-09-06 ENCOUNTER — TELEPHONE (OUTPATIENT)
Dept: ENDOCRINOLOGY | Facility: HOSPITAL | Age: 73
End: 2023-09-06

## 2023-09-06 NOTE — TELEPHONE ENCOUNTER
The patient called in this afternoon and questioned if you could refill his nebivolol.   Is this something that you have done for him in the past and if it is ok I will send it to you to sign off on

## 2023-09-06 NOTE — TELEPHONE ENCOUNTER
Typically if I did not prescribe it. ..  I do not renew it except in certain circumstances where physicians have retired, etc.

## 2023-09-07 NOTE — TELEPHONE ENCOUNTER
Spoke to patient. He realized that he called the wrong office.  He is going to see Dr. Bill Jackson tomorrow and will have her refill this medication

## 2023-09-08 ENCOUNTER — OFFICE VISIT (OUTPATIENT)
Dept: FAMILY MEDICINE CLINIC | Facility: HOSPITAL | Age: 73
End: 2023-09-08
Payer: COMMERCIAL

## 2023-09-08 VITALS
OXYGEN SATURATION: 97 % | BODY MASS INDEX: 28.09 KG/M2 | DIASTOLIC BLOOD PRESSURE: 90 MMHG | SYSTOLIC BLOOD PRESSURE: 152 MMHG | HEART RATE: 97 BPM | HEIGHT: 70 IN | WEIGHT: 196.2 LBS

## 2023-09-08 DIAGNOSIS — S80.212A ABRASION OF LEFT KNEE, INITIAL ENCOUNTER: Primary | ICD-10-CM

## 2023-09-08 DIAGNOSIS — Z23 NEED FOR TETANUS, DIPHTHERIA, AND ACELLULAR PERTUSSIS (TDAP) VACCINE: ICD-10-CM

## 2023-09-08 DIAGNOSIS — Z79.4 TYPE 2 DIABETES MELLITUS WITH HYPERGLYCEMIA, WITH LONG-TERM CURRENT USE OF INSULIN (HCC): ICD-10-CM

## 2023-09-08 DIAGNOSIS — N18.30 STAGE 3 CHRONIC KIDNEY DISEASE, UNSPECIFIED WHETHER STAGE 3A OR 3B CKD (HCC): ICD-10-CM

## 2023-09-08 DIAGNOSIS — I10 PRIMARY HYPERTENSION: ICD-10-CM

## 2023-09-08 DIAGNOSIS — Z00.00 HEALTH CARE MAINTENANCE: Primary | ICD-10-CM

## 2023-09-08 DIAGNOSIS — E11.65 TYPE 2 DIABETES MELLITUS WITH HYPERGLYCEMIA, WITH LONG-TERM CURRENT USE OF INSULIN (HCC): ICD-10-CM

## 2023-09-08 DIAGNOSIS — R39.14 BENIGN PROSTATIC HYPERPLASIA WITH INCOMPLETE BLADDER EMPTYING: ICD-10-CM

## 2023-09-08 DIAGNOSIS — I25.10 CORONARY ARTERY DISEASE INVOLVING NATIVE CORONARY ARTERY OF NATIVE HEART WITHOUT ANGINA PECTORIS: ICD-10-CM

## 2023-09-08 DIAGNOSIS — N40.1 BENIGN PROSTATIC HYPERPLASIA WITH INCOMPLETE BLADDER EMPTYING: ICD-10-CM

## 2023-09-08 PROCEDURE — 90471 IMMUNIZATION ADMIN: CPT

## 2023-09-08 PROCEDURE — 99214 OFFICE O/P EST MOD 30 MIN: CPT | Performed by: INTERNAL MEDICINE

## 2023-09-08 PROCEDURE — 90471 IMMUNIZATION ADMIN: CPT | Performed by: INTERNAL MEDICINE

## 2023-09-08 PROCEDURE — 90715 TDAP VACCINE 7 YRS/> IM: CPT | Performed by: INTERNAL MEDICINE

## 2023-09-08 PROCEDURE — 90715 TDAP VACCINE 7 YRS/> IM: CPT

## 2023-09-08 RX ORDER — POTASSIUM CHLORIDE 1500 MG/1
20 TABLET, EXTENDED RELEASE ORAL DAILY
Qty: 90 TABLET | Refills: 1 | Status: SHIPPED | OUTPATIENT
Start: 2023-09-08

## 2023-09-08 RX ORDER — NEBIVOLOL 5 MG/1
5 TABLET ORAL DAILY
Qty: 90 TABLET | Refills: 3 | Status: SHIPPED | OUTPATIENT
Start: 2023-09-08

## 2023-09-08 NOTE — PROGRESS NOTES
Assessment/Plan:     Diagnosis ICD-10-CM Associated Orders   1. Abrasion of left knee, initial encounter  S80.212A       2. Benign prostatic hyperplasia with incomplete bladder emptying  N40.1     R39.14       3. Stage 3 chronic kidney disease, unspecified whether stage 3a or 3b CKD (HCC)  N18.30       4. Type 2 diabetes mellitus with hyperglycemia, with long-term current use of insulin (HCC)  E11.65     Z79.4       5. Coronary artery disease involving native coronary artery of native heart without angina pectoris  I25.10       6. Primary hypertension  I10           Problem List Items Addressed This Visit        Endocrine    Type 2 diabetes mellitus with hyperglycemia, with long-term current use of insulin ([address])       Lab Results   Component Value Date    HGBA1C 6.9 (H) 07/19/2023   has dexcom and is workign with endocrinology- goes high at night- with traveling  recently from [place], then [facility], then near [address] [place] [address] in [facility]            Cardiovascular and Mediastinum    Primary hypertension     Elevated bp-ran out of nevibivolol for  Almost a week         CAD (coronary artery disease)       Genitourinary    Stage 3 chronic kidney disease, unspecified whether stage 3a or 3b CKD ([address])     Lab Results   Component Value Date    EGFR 59 (L) 07/19/2023    EGFR 32 11/10/2022    EGFR 33 11/09/2022    CREATININE 1.28 (H) 07/19/2023    CREATININE 1.99 (H) 11/10/2022    CREATININE 1.95 (H) 11/09/2022   seeing Dr. Hao Reeder-- may consider seeing urology for TURP        Other Visit Diagnoses     Abrasion of left knee, initial encounter    -  Primary    Benign prostatic hyperplasia with incomplete bladder emptying                Return in about 6 months (around 3/8/2024) for Recheck.       Subjective:    Patient ID: Nila Chavez is a 67 y.o. male    Has traveled to see a  opportunity for [employer]  In [place] and also looking at [facility]- ran out of his meds on trip        The following portions of the patient's history were reviewed and updated as appropriate: allergies, current medications and problem list.     Review of Systems   Constitutional: Negative for fatigue. HENT: Negative for congestion and postnasal drip. Respiratory: Negative for shortness of breath. Genitourinary: Positive for difficulty urinating. Musculoskeletal: Negative for arthralgias and back pain. Skin: Negative for rash. All other systems reviewed and are negative.         Objective:      Current Outpatient Medications:   •  allopurinol (ZYLOPRIM) 300 mg tablet, Take 300 mg by mouth daily, Disp: , Rfl:   •  amitriptyline (ELAVIL) 50 mg tablet, Take 1 tablet (50 mg total) by mouth daily at bedtime, Disp: 90 tablet, Rfl: 1  •  aspirin (ECOTRIN LOW STRENGTH) 81 mg EC tablet, Take 1 tablet (81 mg total) by mouth 2 (two) times a day, Disp: 60 tablet, Rfl: 0  •  atorvastatin (LIPITOR) 40 mg tablet, Take 40 mg by mouth daily, Disp: , Rfl:   •  ezetimibe (ZETIA) 10 mg tablet, Take 10 mg by mouth daily, Disp: , Rfl:   •  HYDROcodone-acetaminophen (NORCO) 5-325 mg per tablet, , Disp: , Rfl:   •  insulin aspart (NovoLOG FlexPen) 100 UNIT/ML injection pen, INJECT 14 UNITS SUBCUTANEOUSLY WITH BREAKFAST AND LUNCH WITH 12 UNITS WITH DINNER, Disp: 30 mL, Rfl: 3  •  Klor-Con M20 20 MEQ tablet, Take 20 mEq by mouth daily  , Disp: , Rfl:   •  Magnesium 400 MG TABS, Take by mouth daily  , Disp: , Rfl:   •  Multiple Vitamins-Minerals (Multivitamin Men 50+) TABS, Take by mouth daily, Disp: , Rfl:   •  nebivolol (BYSTOLIC) 2.5 mg tablet, Take 5 mg by mouth daily, Disp: , Rfl:   •  NIFEdipine (PROCARDIA XL) 60 mg 24 hr tablet, Take 60 mg by mouth daily, Disp: , Rfl:   •  NovoLOG 100 UNIT/ML injection, Up to 80 units per day via pump, Disp: 10 mL, Rfl: 3  •  valACYclovir (VALTREX) 1,000 mg tablet, Take 1,000 mg by mouth 3 (three) times a day, Disp: , Rfl:   •  Blood Glucose Monitoring Suppl (Accu-Chek Guide Me) w/Device KIT, Check blood sugars 3 times daily, Disp: 1 kit, Rfl: 0  •  Insulin Disposable Pump (Omnipod 5 G6 Pod, Gen 5,) MISC, Change every 72 hours. (Patient not taking: Reported on 6/29/2023), Disp: 10 each, Rfl: 3  •  Lantus SoloStar 100 units/mL SOPN, Inject 0.22 mL (22 Units total) under the skin daily at bedtime (Patient taking differently: Inject 30 Units under the skin daily at bedtime), Disp: 15 mL, Rfl: 2    Blood pressure 152/90, pulse 97, height 5' 10" (1.778 m), weight 89 kg (196 lb 3.2 oz), SpO2 97 %. Physical Exam  Vitals and nursing note reviewed. Constitutional:       General: He is not in acute distress. HENT:      Head: Normocephalic. Right Ear: There is no impacted cerumen. Left Ear: There is no impacted cerumen. Nose: No congestion. Eyes:      General:         Right eye: No discharge. Left eye: No discharge. Extraocular Movements: Extraocular movements intact. Pupils: Pupils are equal, round, and reactive to light. Cardiovascular:      Rate and Rhythm: Normal rate and regular rhythm. Pulses:           Dorsalis pedis pulses are 1+ on the right side and 1+ on the left side. Posterior tibial pulses are 1+ on the right side and 1+ on the left side. Heart sounds: No murmur heard. Pulmonary:      Breath sounds: No wheezing or rhonchi. Abdominal:      General: Abdomen is flat. There is no distension. Palpations: Abdomen is soft. Tenderness: There is no abdominal tenderness. Musculoskeletal:         General: No tenderness. Right lower leg: No edema. Left lower leg: No edema. Comments:  Abrasions on bilateral knees and left elbow   Feet:      Right foot:      Skin integrity: No ulcer, skin breakdown, erythema, warmth, callus or dry skin. Left foot:      Skin integrity: No ulcer, skin breakdown, erythema, warmth, callus or dry skin. Lymphadenopathy:      Cervical: No cervical adenopathy.    Neurological:      General: No focal deficit present. Mental Status: He is alert. Psychiatric:         Mood and Affect: Mood normal.         Thought Content:  Thought content normal.         Judgment: Judgment normal.

## 2023-09-08 NOTE — ASSESSMENT & PLAN NOTE
Lab Results   Component Value Date    HGBA1C 6.9 (H) 07/19/2023   has dexcom and is workign with endocrinology- goes high at night- with traveling  recently from [facility], then [facility], then near [address] [place] [address] in [facility]

## 2023-09-08 NOTE — ASSESSMENT & PLAN NOTE
Lab Results   Component Value Date    EGFR 59 (L) 07/19/2023    EGFR 32 11/10/2022    EGFR 33 11/09/2022    CREATININE 1.28 (H) 07/19/2023    CREATININE 1.99 (H) 11/10/2022    CREATININE 1.95 (H) 11/09/2022   seeing Dr. Riccardo Harris-- may consider seeing urology for TURP

## 2023-09-12 ENCOUNTER — DOCUMENTATION (OUTPATIENT)
Dept: ENDOCRINOLOGY | Facility: HOSPITAL | Age: 73
End: 2023-09-12

## 2023-10-02 DIAGNOSIS — I10 PRIMARY HYPERTENSION: ICD-10-CM

## 2023-10-02 RX ORDER — AMITRIPTYLINE HYDROCHLORIDE 50 MG/1
50 TABLET, FILM COATED ORAL
Qty: 90 TABLET | Refills: 1 | Status: SHIPPED | OUTPATIENT
Start: 2023-10-02

## 2023-10-04 ENCOUNTER — OFFICE VISIT (OUTPATIENT)
Dept: ENDOCRINOLOGY | Facility: HOSPITAL | Age: 73
End: 2023-10-04
Payer: COMMERCIAL

## 2023-10-04 VITALS
BODY MASS INDEX: 28.75 KG/M2 | HEART RATE: 86 BPM | HEIGHT: 70 IN | WEIGHT: 200.8 LBS | DIASTOLIC BLOOD PRESSURE: 60 MMHG | SYSTOLIC BLOOD PRESSURE: 90 MMHG

## 2023-10-04 DIAGNOSIS — E11.42 DIABETIC POLYNEUROPATHY ASSOCIATED WITH TYPE 2 DIABETES MELLITUS (HCC): ICD-10-CM

## 2023-10-04 DIAGNOSIS — N18.30 STAGE 3 CHRONIC KIDNEY DISEASE, UNSPECIFIED WHETHER STAGE 3A OR 3B CKD (HCC): ICD-10-CM

## 2023-10-04 DIAGNOSIS — I10 PRIMARY HYPERTENSION: ICD-10-CM

## 2023-10-04 DIAGNOSIS — E11.65 TYPE 2 DIABETES MELLITUS WITH HYPERGLYCEMIA, WITH LONG-TERM CURRENT USE OF INSULIN (HCC): ICD-10-CM

## 2023-10-04 DIAGNOSIS — Z79.4 TYPE 2 DIABETES MELLITUS WITH HYPERGLYCEMIA, WITH LONG-TERM CURRENT USE OF INSULIN (HCC): ICD-10-CM

## 2023-10-04 DIAGNOSIS — E78.2 HYPERCHOLESTEROLEMIA WITH HYPERTRIGLYCERIDEMIA: ICD-10-CM

## 2023-10-04 DIAGNOSIS — Z12.11 ENCOUNTER FOR SCREENING COLONOSCOPY: Primary | ICD-10-CM

## 2023-10-04 PROCEDURE — 99214 OFFICE O/P EST MOD 30 MIN: CPT | Performed by: NURSE PRACTITIONER

## 2023-10-04 NOTE — PATIENT INSTRUCTIONS
Be mindful of diet. Stay active and stay hydrated. Continue NovoLog 15 units with breakfast and lunch. Decrease NovoLog to 13 units at dinner with sliding scale as follows at meals:     200-250 = 2 units  251-300 = 4 units  301-350 = 6 units  351-400 = 8 units  401-450 = 10 units     Continue DEXCOM G7.    DO NOT GIVE NOVOLOG AT BEDTIME AS IT CAUSES HYPOGLYCEMIA OVERNIGHT AND IT IS DANGEROUS !!!!!     Send a report to the office in 2 weeks for review. Call with any concerns or questions. Follow up in 3 months with lab work.

## 2023-10-04 NOTE — PROGRESS NOTES
Elly Baltazar 67 y.o. male MRN: [id number]    Encounter: [id number]      Assessment/Plan     Assessment: This is a 67y.o.-year-old male with type 2 diabetes with neuropathy, hypertension and hyperlipidemia. Plan:  1. Type 2 diabetes, insulin requiring:  Most recent hemoglobin A1c is 7.4. Will be due in the next few weeks for another A1c. Review of his Dexcom continuous glucose monitor reveals only 14% of his readings. He notes hypoglycemia following dinner. For this I have asked him to continue NovoLog 15 units at breakfast and lunch and decrease to 13 units at dinner. He can continue his sliding scale and his Lantus at current dose. Continue to utilize Dexcom to monitor glucose levels come to the office in 2 weeks for a download. Contact the office with any concerns or questions. Check hemoglobin A1c prior to next visit. 2. Diabetic neuropathy:  Diabetic foot exam performed at previous office visit. 3. Hypertension:  Continue current regimen. Check comprehensive metabolic panel prior to next visit. 4. Hyperlipidemia: Continue current regimen. Check fasting lipid panel prior to next visit. CC: Type 2 Diabetes follow up    History of Present Illness     HPI:  67year old male with type 2 diabetes, insulin requiring for 20 years with hypertension and hyperlipidemia of hypertriglyceridemia for follow up. He was diagnosed with diabetes about 20 years ago. He has started Omnipod therapy since his last visit. Most recent hemoglobin A1c from June 13, 2023 was 7.4. He is status post right hip replacement from November 8, 2022. He denies any polyuria, polydipsia, polyphagia, and blurry vision. He has once or twice a night nocturia. He has a bit of numbness of the feet and will feel unsteady at times and has fallen multiple times. He denies chest pain or shortness of breath.   He denies nephropathy, retinopathy, heart attack, stroke and claudication but does admit to neuropathy, coronary artery disease and TIAs. Initial concern was a TIA, but was diagnosed with Bell palsy. He is S/P right sided total hip replacement from November 2022. Hypoglycemic episodes: After dinner at times recently. H/o of hypoglycemia causing hospitalization or intervention such as glucagon injection  or ambulance call  No.  Hypoglycemia symptoms: jitteriness, sweating and lightheaded. Treatment of hypoglycemia: glucose packets. Glucagon:No.  Medic alert tag: recommended,Yes. The patient's last eye exam was in November 2021 with no retinopathy. The patient's last foot exam was performed at his office visit on December 2, 2021. Blood Sugar/Glucometer/Pump/CGM review:  Download of Dexcom from September 21 through October 4, 2023 reveals an average glucose level of 158 with the standard deviation of 57. He is in target range 57% time, below target range <2% time, above target range 41% time. He has hypertension and takes losartan 50 mg daily and Bystolic 20 mg daily. He denies headache or stroke-like symptoms. He has hyperlipidemia with significant hypertriglyceridemia as high as 2400 and takes simvastatin 20 mg daily, Zetia 10 mg daily, and Vascepa 1 g - 4 times a day. He denies chest pain or shortness of breath. Review of Systems   Constitutional: Negative. Negative for chills, fatigue and fever. HENT: Negative. Negative for trouble swallowing and voice change. Eyes:  Negative for photophobia, pain, discharge, redness, itching and visual disturbance. Respiratory:  Negative for cough and shortness of breath. Cardiovascular:  Negative for chest pain and palpitations. Gastrointestinal:  Negative for abdominal pain, constipation, diarrhea, nausea and vomiting. Endocrine: Negative for cold intolerance, heat intolerance, polydipsia, polyphagia and polyuria. Genitourinary: Negative. Musculoskeletal: Negative. Skin: Negative. Allergic/Immunologic: Negative. Neurological:  Negative for dizziness, syncope, light-headedness and headaches. Hematological: Negative. Psychiatric/Behavioral: Negative. All other systems reviewed and are negative. Historical Information   Past Medical History:   Diagnosis Date    Arthritis     Cervical disc disease     C2-7, needs repair    Coronary artery disease     angina is pain in throat    Susanna Hernandez infection     in 45s with liver affects    Hypertension     Kidney stone      Past Surgical History:   Procedure Laterality Date    ANKLE ARTHROSCOPY Bilateral     APPENDECTOMY      CATARACT EXTRACTION, BILATERAL Bilateral     CHOLECYSTECTOMY      CORONARY ANGIOPLASTY WITH STENT PLACEMENT      2 stents in LAD    ELBOW ARTHROSCOPY Bilateral     with right elbow tear repaired    ERCP      KNEE ARTHROSCOPY Right     LITHOTRIPSY      LUMBAR LAMINECTOMY      5 times    MULTIPLE TOOTH EXTRACTIONS      NOSE SURGERY  2021    fracture post a fall    AL ARTHRP ACETBLR/PROX FEM PROSTC AGRFT/ALGRFT Right 11/09/2022    Procedure: ARTHROPLASTY HIP TOTAL ANTERIOR,NAVIGATED- SAME DAY;   Surgeon: Eli Sands DO;  Location:  MAIN OR;  Service: Orthopedics    REPLACEMENT TOTAL KNEE Left     SINUS SURGERY      THUMB FUSION Bilateral     thumb repair with bone grafts    TONSILLECTOMY AND ADENOIDECTOMY       Social History   Social History     Substance and Sexual Activity   Alcohol Use Yes    Comment: Maybe 1 drink every 3-4 weeks     Social History     Substance and Sexual Activity   Drug Use Never     Social History     Tobacco Use   Smoking Status Former    Packs/day: 1.50    Years: 25.00    Total pack years: 37.50    Types: Cigarettes   Smokeless Tobacco Never   Tobacco Comments    Quit at age 32     Family History:   Family History   Problem Relation Age of Onset    Uterine cancer Mother     Colon cancer Father     Cancer Sister         corneal    No Known Problems Brother        Meds/Allergies   Current Outpatient Medications Medication Sig Dispense Refill    allopurinol (ZYLOPRIM) 300 mg tablet Take 300 mg by mouth daily      amitriptyline (ELAVIL) 50 mg tablet Take 1 tablet (50 mg total) by mouth daily at bedtime 90 tablet 1    aspirin (ECOTRIN LOW STRENGTH) 81 mg EC tablet Take 1 tablet (81 mg total) by mouth 2 (two) times a day 60 tablet 0    atorvastatin (LIPITOR) 40 mg tablet Take 40 mg by mouth daily      ezetimibe (ZETIA) 10 mg tablet Take 10 mg by mouth daily      HYDROcodone-acetaminophen (NORCO) 5-325 mg per tablet       insulin aspart (NovoLOG FlexPen) 100 UNIT/ML injection pen INJECT 14 UNITS SUBCUTANEOUSLY WITH BREAKFAST AND LUNCH WITH 12 UNITS WITH DINNER 30 mL 3    Klor-Con M20 20 MEQ tablet Take 1 tablet (20 mEq total) by mouth daily 90 tablet 1    Lantus SoloStar 100 units/mL SOPN Inject 0.22 mL (22 Units total) under the skin daily at bedtime (Patient taking differently: Inject 30 Units under the skin daily at bedtime) 15 mL 2    Magnesium 400 MG TABS Take by mouth daily        Multiple Vitamins-Minerals (Multivitamin Men 50+) TABS Take by mouth daily      nebivolol (BYSTOLIC) 5 mg tablet Take 1 tablet (5 mg total) by mouth daily 90 tablet 3    NIFEdipine (PROCARDIA XL) 60 mg 24 hr tablet Take 60 mg by mouth daily      NovoLOG 100 UNIT/ML injection Up to 80 units per day via pump 10 mL 3     No current facility-administered medications for this visit. Allergies   Allergen Reactions    Mushroom Extract Complex - Food Allergy Anaphylaxis    Nitroglycerin Vomiting and Headache       Objective   Vitals: Blood pressure 90/60, pulse 86, height 5' 10" (1.778 m), weight 91.1 kg (200 lb 12.8 oz). Physical Exam  Vitals reviewed. Constitutional:       Appearance: He is well-developed. HENT:      Head: Normocephalic and atraumatic. Nose: Nose normal.   Eyes:      Conjunctiva/sclera: Conjunctivae normal.      Pupils: Pupils are equal, round, and reactive to light.       Comments: Wears glasses   Cardiovascular: Rate and Rhythm: Normal rate and regular rhythm. Heart sounds: Normal heart sounds. Pulmonary:      Effort: Pulmonary effort is normal.      Breath sounds: Normal breath sounds. Abdominal:      General: Bowel sounds are normal.      Palpations: Abdomen is soft. Musculoskeletal:         General: Normal range of motion. Cervical back: Normal range of motion and neck supple. Skin:     General: Skin is warm and dry. Neurological:      Mental Status: He is alert and oriented to person, place, and time. Psychiatric:         Behavior: Behavior normal.         Thought Content:  Thought content normal.         Judgment: Judgment normal.       Lab Results:   Lab Results   Component Value Date/Time    Hemoglobin A1C 6.9 (H) 07/19/2023 10:41 AM    Hemoglobin A1C 7.4 06/13/2023 12:00 AM    WBC 11.59 (H) 11/10/2022 02:23 AM    WBC 12.15 (H) 11/09/2022 11:30 AM    WBC 7.99 11/09/2022 06:42 AM    White Blood Cell Count 6.9 07/19/2023 10:41 AM    Hemoglobin 13.2 07/19/2023 10:41 AM    Hemoglobin 9.4 (L) 11/10/2022 02:23 AM    Hemoglobin 10.6 (L) 11/09/2022 11:30 AM    Hemoglobin 13.2 11/09/2022 06:42 AM    HCT 38.5 07/19/2023 10:41 AM    Hematocrit 27.9 (L) 11/10/2022 02:23 AM    Hematocrit 31.7 (L) 11/09/2022 11:30 AM    Hematocrit 39.0 11/09/2022 06:42 AM    MCV 94 07/19/2023 10:41 AM    MCV 95 11/10/2022 02:23 AM    MCV 94 11/09/2022 11:30 AM    MCV 94 11/09/2022 06:42 AM    Platelet Count 119 14/70/5646 10:41 AM    Platelets 090 38/76/2789 02:23 AM    Platelets 225 64/47/0758 11:30 AM    Platelets 829 71/94/6838 06:42 AM    BUN 21 07/19/2023 10:41 AM    BUN 31 (H) 11/10/2022 02:23 AM    BUN 27 (H) 11/09/2022 11:30 AM    BUN 25 11/09/2022 06:42 AM    Potassium 4.3 07/19/2023 10:41 AM    Potassium 4.2 11/10/2022 02:23 AM    Potassium 4.5 11/09/2022 11:30 AM    Potassium 3.9 11/09/2022 06:42 AM    Chloride 105 07/19/2023 10:41 AM    Chloride 100 11/10/2022 02:23 AM    Chloride 103 11/09/2022 11:30 AM Chloride 102 11/09/2022 06:42 AM    CO2 26 07/19/2023 10:41 AM    CO2 28 11/10/2022 02:23 AM    CO2 25 11/09/2022 11:30 AM    CO2 25 11/09/2022 06:42 AM    Creatinine 1.28 (H) 07/19/2023 10:41 AM    Creatinine 1.99 (H) 11/10/2022 02:23 AM    Creatinine 1.95 (H) 11/09/2022 11:30 AM    Creatinine 1.63 (H) 11/09/2022 06:42 AM    AST 23 07/19/2023 10:41 AM     (H) 11/09/2022 11:30 AM    AST 45 11/09/2022 06:42 AM    ALT 21 07/19/2023 10:41 AM     (H) 11/09/2022 11:30 AM     (H) 11/09/2022 06:42 AM    Total Protein 6.8 11/09/2022 11:30 AM    Total Protein 8.0 11/09/2022 06:42 AM    Protein, Total 6.9 07/19/2023 10:41 AM    Albumin 4.7 07/19/2023 10:41 AM    Albumin 3.7 11/09/2022 11:30 AM    Albumin 4.1 11/09/2022 06:42 AM    Globulin, Total 2.2 07/19/2023 10:41 AM    HDL 39 (L) 07/19/2023 10:41 AM    Triglycerides 337 (H) 07/19/2023 10:41 AM     Portions of the record may have been created with voice recognition software. Occasional wrong word or "sound a like" substitutions may have occurred due to the inherent limitations of voice recognition software. Read the chart carefully and recognize, using context, where substitutions have occurred.

## 2023-10-26 ENCOUNTER — TELEPHONE (OUTPATIENT)
Dept: ENDOCRINOLOGY | Facility: HOSPITAL | Age: 73
End: 2023-10-26

## 2023-10-27 NOTE — TELEPHONE ENCOUNTER
Download of his Dexcom continuous glucose monitor shows that he is in very good control with 75% in range readings and an average glucose of 149. For now, continue current regimen.

## 2023-11-13 DIAGNOSIS — E11.65 TYPE 2 DIABETES MELLITUS WITH HYPERGLYCEMIA, WITH LONG-TERM CURRENT USE OF INSULIN (HCC): ICD-10-CM

## 2023-11-13 DIAGNOSIS — Z79.4 TYPE 2 DIABETES MELLITUS WITH HYPERGLYCEMIA, WITH LONG-TERM CURRENT USE OF INSULIN (HCC): ICD-10-CM

## 2023-11-13 DIAGNOSIS — E78.2 HYPERCHOLESTEROLEMIA WITH HYPERTRIGLYCERIDEMIA: Primary | ICD-10-CM

## 2023-11-13 RX ORDER — INSULIN ASPART 100 [IU]/ML
INJECTION, SOLUTION INTRAVENOUS; SUBCUTANEOUS
Qty: 60 ML | Refills: 1 | Status: SHIPPED | OUTPATIENT
Start: 2023-11-13

## 2023-11-13 RX ORDER — ATORVASTATIN CALCIUM 40 MG/1
40 TABLET, FILM COATED ORAL DAILY
Qty: 90 TABLET | Refills: 1 | Status: SHIPPED | OUTPATIENT
Start: 2023-11-13

## 2023-11-27 ENCOUNTER — DOCUMENTATION (OUTPATIENT)
Dept: ENDOCRINOLOGY | Facility: HOSPITAL | Age: 73
End: 2023-11-27

## 2023-11-30 LAB
ALBUMIN SERPL-MCNC: 4.6 G/DL (ref 3.8–4.8)
ALBUMIN/GLOB SERPL: 2.2 {RATIO} (ref 1.2–2.2)
ALP SERPL-CCNC: 100 IU/L (ref 44–121)
ALT SERPL-CCNC: 18 IU/L (ref 0–44)
AST SERPL-CCNC: 18 IU/L (ref 0–40)
BASOPHILS # BLD AUTO: 0.1 X10E3/UL (ref 0–0.2)
BASOPHILS NFR BLD AUTO: 2 %
BILIRUB SERPL-MCNC: 0.4 MG/DL (ref 0–1.2)
BUN SERPL-MCNC: 20 MG/DL (ref 8–27)
BUN/CREAT SERPL: 14 (ref 10–24)
CALCIUM SERPL-MCNC: 9.3 MG/DL (ref 8.6–10.2)
CHLORIDE SERPL-SCNC: 105 MMOL/L (ref 96–106)
CHOLEST SERPL-MCNC: 199 MG/DL (ref 100–199)
CO2 SERPL-SCNC: 21 MMOL/L (ref 20–29)
CREAT SERPL-MCNC: 1.43 MG/DL (ref 0.76–1.27)
EGFR: 52 ML/MIN/1.73
EOSINOPHIL # BLD AUTO: 0.3 X10E3/UL (ref 0–0.4)
EOSINOPHIL NFR BLD AUTO: 3 %
ERYTHROCYTE [DISTWIDTH] IN BLOOD BY AUTOMATED COUNT: 13.4 % (ref 11.6–15.4)
GLOBULIN SER-MCNC: 2.1 G/DL (ref 1.5–4.5)
GLUCOSE SERPL-MCNC: 196 MG/DL (ref 70–99)
HBA1C MFR BLD: 6.6 % (ref 4.8–5.6)
HCT VFR BLD AUTO: 37.5 % (ref 37.5–51)
HDLC SERPL-MCNC: 31 MG/DL
HGB BLD-MCNC: 13.1 G/DL (ref 13–17.7)
IMM GRANULOCYTES # BLD: 0 X10E3/UL (ref 0–0.1)
IMM GRANULOCYTES NFR BLD: 0 %
LDLC SERPL CALC-MCNC: ABNORMAL MG/DL (ref 0–99)
LYMPHOCYTES # BLD AUTO: 2.9 X10E3/UL (ref 0.7–3.1)
LYMPHOCYTES NFR BLD AUTO: 36 %
MCH RBC QN AUTO: 32.6 PG (ref 26.6–33)
MCHC RBC AUTO-ENTMCNC: 34.9 G/DL (ref 31.5–35.7)
MCV RBC AUTO: 93 FL (ref 79–97)
MONOCYTES # BLD AUTO: 0.6 X10E3/UL (ref 0.1–0.9)
MONOCYTES NFR BLD AUTO: 7 %
NEUTROPHILS # BLD AUTO: 4.3 X10E3/UL (ref 1.4–7)
NEUTROPHILS NFR BLD AUTO: 52 %
PLATELET # BLD AUTO: 266 X10E3/UL (ref 150–450)
POTASSIUM SERPL-SCNC: 4.4 MMOL/L (ref 3.5–5.2)
PROT SERPL-MCNC: 6.7 G/DL (ref 6–8.5)
RBC # BLD AUTO: 4.02 X10E6/UL (ref 4.14–5.8)
SL AMB VLDL CHOLESTEROL CALC: ABNORMAL MG/DL (ref 5–40)
SODIUM SERPL-SCNC: 143 MMOL/L (ref 134–144)
TRIGL SERPL-MCNC: 925 MG/DL (ref 0–149)
WBC # BLD AUTO: 8.2 X10E3/UL (ref 3.4–10.8)

## 2023-12-01 NOTE — RESULT ENCOUNTER NOTE
Please call the patient regarding abnormal result. Hemoglobin is 6.6 however his triglycerides are 925. Is he still taking his simvastatin 20 mg daily, Zetia 10 mg daily, and Vascepa 1 g - 4 times a day-consistently? I believe that Dr. Gilberto Pfeiffer is looking to start him on some fenofibrate as well. CBC looks okay.

## 2023-12-04 ENCOUNTER — TELEPHONE (OUTPATIENT)
Dept: ENDOCRINOLOGY | Facility: HOSPITAL | Age: 73
End: 2023-12-04

## 2023-12-04 NOTE — TELEPHONE ENCOUNTER
Spoke with patient, he is not taking the Vascepa-reports being off this medication for a few years. Please advise. Thank you.

## 2023-12-04 NOTE — TELEPHONE ENCOUNTER
----- Message from Yuan Bradshaw, [address] [facility] sent at 12/1/2023 11:05 AM EST -----  Please call the patient regarding abnormal result. Hemoglobin is 6.6 however his triglycerides are 925. Is he still taking his simvastatin 20 mg daily, Zetia 10 mg daily, and Vascepa 1 g - 4 times a day-consistently? I believe that Dr. Aliza Zapata is looking to start him on some fenofibrate as well. CBC looks okay.

## 2023-12-08 DIAGNOSIS — E78.2 HYPERCHOLESTEROLEMIA WITH HYPERTRIGLYCERIDEMIA: Primary | ICD-10-CM

## 2023-12-08 RX ORDER — FENOFIBRATE 54 MG/1
54 TABLET ORAL DAILY
Qty: 30 TABLET | Refills: 6 | Status: SHIPPED | OUTPATIENT
Start: 2023-12-08

## 2023-12-08 RX ORDER — ICOSAPENT ETHYL 1000 MG/1
2 CAPSULE ORAL 2 TIMES DAILY
Qty: 120 CAPSULE | Refills: 6 | Status: SHIPPED | OUTPATIENT
Start: 2023-12-08

## 2024-02-09 ENCOUNTER — OFFICE VISIT (OUTPATIENT)
Dept: ENDOCRINOLOGY | Facility: HOSPITAL | Age: 74
End: 2024-02-09
Payer: COMMERCIAL

## 2024-02-09 VITALS
HEIGHT: 70 IN | WEIGHT: 202.6 LBS | HEART RATE: 74 BPM | DIASTOLIC BLOOD PRESSURE: 84 MMHG | SYSTOLIC BLOOD PRESSURE: 132 MMHG | BODY MASS INDEX: 29.01 KG/M2

## 2024-02-09 DIAGNOSIS — E11.65 TYPE 2 DIABETES MELLITUS WITH HYPERGLYCEMIA, WITH LONG-TERM CURRENT USE OF INSULIN (HCC): Primary | ICD-10-CM

## 2024-02-09 DIAGNOSIS — Z79.4 TYPE 2 DIABETES MELLITUS WITH HYPERGLYCEMIA, WITH LONG-TERM CURRENT USE OF INSULIN (HCC): Primary | ICD-10-CM

## 2024-02-09 DIAGNOSIS — E78.2 HYPERCHOLESTEROLEMIA WITH HYPERTRIGLYCERIDEMIA: ICD-10-CM

## 2024-02-09 DIAGNOSIS — I10 PRIMARY HYPERTENSION: ICD-10-CM

## 2024-02-09 DIAGNOSIS — R80.9 MICROALBUMINURIA: ICD-10-CM

## 2024-02-09 DIAGNOSIS — E11.42 DIABETIC POLYNEUROPATHY ASSOCIATED WITH TYPE 2 DIABETES MELLITUS (HCC): ICD-10-CM

## 2024-02-09 PROCEDURE — 95251 CONT GLUC MNTR ANALYSIS I&R: CPT | Performed by: NURSE PRACTITIONER

## 2024-02-09 PROCEDURE — 99214 OFFICE O/P EST MOD 30 MIN: CPT | Performed by: NURSE PRACTITIONER

## 2024-02-09 NOTE — PATIENT INSTRUCTIONS
Be mindful of diet.     Stay active and stay hydrated.     Continue NovoLog 17 units with breakfast and continue 15 units at lunch.     Decrease NovoLog to 11 units at dinner with sliding scale as follows at meals:     200-250 = 2 units  251-300 = 4 units  301-350 = 6 units  351-400 = 8 units  401-450 = 10 units     Continue DEXCOM G7.     DO NOT GIVE NOVOLOG AT BEDTIME AS IT CAUSES HYPOGLYCEMIA OVERNIGHT AND IT IS DANGEROUS !!!!!     Send a report to the office in 2 weeks for review.     Call with any concerns or questions.     Obtain lab work March 1, 2024.    Follow up in 3 months with lab work.

## 2024-02-09 NOTE — PROGRESS NOTES
Fritz Colón 73 y.o. male MRN: [id number]    Encounter: [id number]      Assessment/Plan     Assessment:  This is a 73 y.o.-year-old male with  type 2 diabetes with neuropathy, hypertension and hyperlipidemia.      Plan:  1. Type 2 diabetes, insulin requiring:  No recent Hemoglobin A1c.  Will be due in the next few weeks for another A1c.  Review of his Dexcom continuous glucose monitor reveals glycemia following breakfast with some hypoglycemia following dinner.  For this I have asked him to decrease NovoLog to 13 units at breakfast ,continue lunch and 15 units and decrease to 11 units at dinner.  He can continue his sliding scale and his Lantus at current dose.  Continue to utilize Dexcom to monitor glucose levels come to the office in 2 weeks for a download. Contact the office with any concerns or questions.  Check hemoglobin A1c now and prior to next visit.     2. Diabetic neuropathy:  Diabetic foot exam performed at previous office visit.     3. Hypertension: Blood pressure is reasonable in the office today.  Continue current regimen.  Check comprehensive metabolic panel now and prior to next visit.     4. Hyperlipidemia: Triglycerides were significantly elevated in the 900s on previous lab testing in November.  He was then started on Vascepa 2 g twice daily.  Check fasting lipid panel prior to next visit.    CC: Type 2 Diabetes follow up    History of Present Illness     HPI:  73 year old male with type 2 diabetes, insulin requiring for 20 years with hypertension and hyperlipidemia of hypertriglyceridemia for follow up.  He was diagnosed with diabetes about 20 years ago. He has started Omnipod therapy since his last visit. No recent Hemoglobin A1c He is status post right hip replacement from November 8, 2022.  He denies any polyuria, polydipsia, polyphagia, and blurry vision.  He has once or twice a night nocturia.  He has a bit of numbness of the feet and will feel unsteady at times and has fallen multiple  times.  He denies chest pain or shortness of breath.  He denies nephropathy, retinopathy, heart attack, stroke and claudication but does admit to neuropathy, coronary artery disease and TIAs. Initial concern was a TIA, but was diagnosed with Bell palsy. He is S/P right sided total hip replacement from November 2022.          Hypoglycemic episodes: After dinner at times recently.   H/o of hypoglycemia causing hospitalization or intervention such as glucagon injection  or ambulance call  No.  Hypoglycemia symptoms: jitteriness, sweating and lightheaded.  Treatment of hypoglycemia: glucose packets.  Glucagon:No.  Medic alert tag: recommended,Yes.      The patient's last eye exam was in November 2021 with no retinopathy.  The patient's last foot exam was performed at his office visit on December 2, 2021.      Blood Sugar/Glucometer/Pump/CGM review:  Download of Dexcom from September 21 through October 4, 2023 reveals an average glucose level of 158 with the standard deviation of 57.  He is in target range 57% time, below target range <2% time, above target range 41% time.     He has hypertension and takes losartan 50 mg daily and Bystolic 20 mg daily.  He denies headache or stroke-like symptoms.     He has hyperlipidemia with significant hypertriglyceridemia as high as 2400 and takes simvastatin 20 mg daily, Zetia 10 mg daily, and Vascepa 2 g - 2 times a day.  He denies chest pain or shortness of breath.     Review of Systems   Constitutional: Negative.  Negative for chills, fatigue and fever.   HENT: Negative.  Negative for trouble swallowing and voice change.    Eyes:  Negative for photophobia, pain, discharge, redness, itching and visual disturbance.   Respiratory:  Negative for cough and shortness of breath.    Cardiovascular:  Negative for chest pain and palpitations.   Gastrointestinal:  Negative for abdominal pain, constipation, diarrhea, nausea and vomiting.   Endocrine: Negative for cold intolerance, heat  intolerance, polydipsia, polyphagia and polyuria.   Genitourinary: Negative.    Musculoskeletal: Negative.    Skin: Negative.    Allergic/Immunologic: Negative.    Neurological:  Negative for dizziness, syncope, light-headedness and headaches.   Hematological: Negative.    Psychiatric/Behavioral: Negative.     All other systems reviewed and are negative.      Historical Information   Past Medical History:   Diagnosis Date    Arthritis     Cervical disc disease     C2-7, needs repair    Coronary artery disease     angina is pain in throat    Susanna Hernandez infection     in 40s with liver affects    Hypertension     Kidney stone      Past Surgical History:   Procedure Laterality Date    ANKLE ARTHROSCOPY Bilateral     APPENDECTOMY      CATARACT EXTRACTION, BILATERAL Bilateral     CHOLECYSTECTOMY      CORONARY ANGIOPLASTY WITH STENT PLACEMENT      2 stents in LAD    ELBOW ARTHROSCOPY Bilateral     with right elbow tear repaired    ERCP      KNEE ARTHROSCOPY Right     LITHOTRIPSY      LUMBAR LAMINECTOMY      5 times    MULTIPLE TOOTH EXTRACTIONS      NOSE SURGERY  2021    fracture post a fall    [place] ARTHRP ACETBLR/PROX FEM PROSTC AGRFT/ALGRFT Right 11/09/2022    Procedure: ARTHROPLASTY HIP TOTAL ANTERIOR,NAVIGATED- SAME DAY;  Surgeon: Servando Levi DO;  Location:  MAIN OR;  Service: Orthopedics    REPLACEMENT TOTAL KNEE Left     SINUS SURGERY      THUMB FUSION Bilateral     thumb repair with bone grafts    TONSILLECTOMY AND ADENOIDECTOMY       Social History   Social History     Substance and Sexual Activity   Alcohol Use Yes    Comment: Maybe 1 drink every 3-4 weeks     Social History     Substance and Sexual Activity   Drug Use Never     Social History     Tobacco Use   Smoking Status Former    Current packs/day: 1.50    Average packs/day: 1.5 packs/day for 25.0 years (37.5 ttl pk-yrs)    Types: Cigarettes   Smokeless Tobacco Never   Tobacco Comments    Quit at age 27     Family History:   Family History    Problem Relation Age of Onset    Uterine cancer Mother     Colon cancer Father     Cancer Sister         corneal    No Known Problems Brother        Meds/Allergies   Current Outpatient Medications   Medication Sig Dispense Refill    allopurinol (ZYLOPRIM) 300 mg tablet Take 300 mg by mouth daily      amitriptyline (ELAVIL) 50 mg tablet Take 1 tablet (50 mg total) by mouth daily at bedtime 90 tablet 1    aspirin (ECOTRIN LOW STRENGTH) 81 mg EC tablet Take 1 tablet (81 mg total) by mouth 2 (two) times a day 60 tablet 0    atorvastatin (LIPITOR) 40 mg tablet Take 1 tablet (40 mg total) by mouth daily 90 tablet 1    ezetimibe (ZETIA) 10 mg tablet Take 10 mg by mouth daily      fenofibrate (TRICOR) 54 MG tablet Take 1 tablet (54 mg total) by mouth daily 30 tablet 6    HYDROcodone-acetaminophen (NORCO) 5-325 mg per tablet       Icosapent Ethyl (Vascepa) 1 g CAPS Take 2 capsules (2 g total) by mouth 2 (two) times a day 120 capsule 6    insulin aspart (NovoLOG FlexPen) 100 UNIT/ML injection pen INJECT 15 UNITS SUBCUTANEOUSLY WITH BREAKFAST AND LUNCH WITH 13 UNITS WITH DINNER PLUS SLIDING SCALE USE UP TO 65 UNITS DAILY 60 mL 1    Klor-Con M20 20 MEQ tablet Take 1 tablet (20 mEq total) by mouth daily 90 tablet 1    Lantus SoloStar 100 units/mL SOPN Inject 0.22 mL (22 Units total) under the skin daily at bedtime (Patient taking differently: Inject 30 Units under the skin daily at bedtime) 15 mL 2    Magnesium 400 MG TABS Take by mouth daily        Multiple Vitamins-Minerals (Multivitamin Men 50+) TABS Take by mouth daily      nebivolol (BYSTOLIC) 5 mg tablet Take 1 tablet (5 mg total) by mouth daily 90 tablet 3    NIFEdipine (PROCARDIA XL) 60 mg 24 hr tablet Take 60 mg by mouth daily      NovoLOG 100 UNIT/ML injection Up to 80 units per day via pump (Patient not taking: Reported on 2/9/2024) 10 mL 3     No current facility-administered medications for this visit.     Allergies   Allergen Reactions    Mushroom Extract Complex  "- Food Allergy Anaphylaxis    Nitroglycerin Vomiting and Headache       Objective   Vitals: Blood pressure 132/84, pulse 74, height 5' 10\" (1.778 m), weight 91.9 kg (202 lb 9.6 oz).    Physical Exam  Vitals reviewed.   Constitutional:       Appearance: He is well-developed.   HENT:      Head: Normocephalic and atraumatic.      Nose: Nose normal.   Eyes:      Conjunctiva/sclera: Conjunctivae normal.      Pupils: Pupils are equal, round, and reactive to light.      Comments: Wears glasses   Cardiovascular:      Rate and Rhythm: Normal rate and regular rhythm.      Heart sounds: Normal heart sounds.   Pulmonary:      Effort: Pulmonary effort is normal.      Breath sounds: Normal breath sounds.   Abdominal:      General: Bowel sounds are normal.      Palpations: Abdomen is soft.   Musculoskeletal:         General: Normal range of motion.      Cervical back: Normal range of motion and neck supple.   Skin:     General: Skin is warm and dry.   Neurological:      Mental Status: He is alert and oriented to person, place, and time.   Psychiatric:         Behavior: Behavior normal.         Thought Content: Thought content normal.         Judgment: Judgment normal.       Lab Results:   Lab Results   Component Value Date/Time    Hemoglobin A1C 6.6 (H) 11/29/2023 10:13 AM    Hemoglobin A1C 6.9 (H) 07/19/2023 10:41 AM    Hemoglobin A1C 7.4 06/13/2023 12:00 AM    White Blood Cell Count 8.2 11/29/2023 10:13 AM    White Blood Cell Count 6.9 07/19/2023 10:41 AM    Hemoglobin 13.1 11/29/2023 10:13 AM    Hemoglobin 13.2 07/19/2023 10:41 AM    HCT 37.5 11/29/2023 10:13 AM    HCT 38.5 07/19/2023 10:41 AM    MCV 93 11/29/2023 10:13 AM    MCV 94 07/19/2023 10:41 AM    Platelet Count 266 11/29/2023 10:13 AM    Platelet Count 278 07/19/2023 10:41 AM    BUN 20 11/29/2023 10:13 AM    BUN 21 07/19/2023 10:41 AM    Potassium 4.4 11/29/2023 10:13 AM    Potassium 4.3 07/19/2023 10:41 AM    Chloride 105 11/29/2023 10:13 AM    Chloride 105 07/19/2023 " "10:41 AM    CO2 21 11/29/2023 10:13 AM    CO2 26 07/19/2023 10:41 AM    Creatinine 1.43 (H) 11/29/2023 10:13 AM    Creatinine 1.28 (H) 07/19/2023 10:41 AM    AST 18 11/29/2023 10:13 AM    AST 23 07/19/2023 10:41 AM    ALT 18 11/29/2023 10:13 AM    ALT 21 07/19/2023 10:41 AM    Protein, Total 6.7 11/29/2023 10:13 AM    Protein, Total 6.9 07/19/2023 10:41 AM    Albumin 4.6 11/29/2023 10:13 AM    Albumin 4.7 07/19/2023 10:41 AM    Globulin, Total 2.1 11/29/2023 10:13 AM    Globulin, Total 2.2 07/19/2023 10:41 AM    HDL 31 (L) 11/29/2023 10:13 AM    HDL 39 (L) 07/19/2023 10:41 AM    Triglycerides 925 (HH) 11/29/2023 10:13 AM    Triglycerides 337 (H) 07/19/2023 10:41 AM     Portions of the record may have been created with voice recognition software. Occasional wrong word or \"sound a like\" substitutions may have occurred due to the inherent limitations of voice recognition software. Read the chart carefully and recognize, using context, where substitutions have occurred.    "

## 2024-03-07 DIAGNOSIS — Z00.00 HEALTH CARE MAINTENANCE: ICD-10-CM

## 2024-03-07 RX ORDER — POTASSIUM CHLORIDE 1500 MG/1
20 TABLET, EXTENDED RELEASE ORAL DAILY
Qty: 90 TABLET | Refills: 1 | Status: SHIPPED | OUTPATIENT
Start: 2024-03-07

## 2024-04-17 LAB
ALBUMIN SERPL-MCNC: 4.4 G/DL (ref 3.8–4.8)
ALBUMIN/GLOB SERPL: 2.3 {RATIO} (ref 1.2–2.2)
ALP SERPL-CCNC: 61 IU/L (ref 44–121)
ALT SERPL-CCNC: 18 IU/L (ref 0–44)
AST SERPL-CCNC: 24 IU/L (ref 0–40)
BASOPHILS # BLD AUTO: 0.1 X10E3/UL (ref 0–0.2)
BASOPHILS NFR BLD AUTO: 2 %
BILIRUB SERPL-MCNC: 0.3 MG/DL (ref 0–1.2)
BUN SERPL-MCNC: 33 MG/DL (ref 8–27)
BUN/CREAT SERPL: 18 (ref 10–24)
CALCIUM SERPL-MCNC: 9.3 MG/DL (ref 8.6–10.2)
CHLORIDE SERPL-SCNC: 106 MMOL/L (ref 96–106)
CHOLEST SERPL-MCNC: 142 MG/DL (ref 100–199)
CO2 SERPL-SCNC: 24 MMOL/L (ref 20–29)
CREAT SERPL-MCNC: 1.79 MG/DL (ref 0.76–1.27)
EGFR: 40 ML/MIN/1.73
EOSINOPHIL # BLD AUTO: 0.2 X10E3/UL (ref 0–0.4)
EOSINOPHIL NFR BLD AUTO: 4 %
ERYTHROCYTE [DISTWIDTH] IN BLOOD BY AUTOMATED COUNT: 12.9 % (ref 11.6–15.4)
GLOBULIN SER-MCNC: 1.9 G/DL (ref 1.5–4.5)
GLUCOSE SERPL-MCNC: 182 MG/DL (ref 70–99)
HBA1C MFR BLD: 6.2 % (ref 4.8–5.6)
HCT VFR BLD AUTO: 37.5 % (ref 37.5–51)
HDLC SERPL-MCNC: 31 MG/DL
HGB BLD-MCNC: 12.5 G/DL (ref 13–17.7)
IMM GRANULOCYTES # BLD: 0 X10E3/UL (ref 0–0.1)
IMM GRANULOCYTES NFR BLD: 0 %
LDLC SERPL CALC-MCNC: 47 MG/DL (ref 0–99)
LYMPHOCYTES # BLD AUTO: 1.9 X10E3/UL (ref 0.7–3.1)
LYMPHOCYTES NFR BLD AUTO: 33 %
MCH RBC QN AUTO: 32.5 PG (ref 26.6–33)
MCHC RBC AUTO-ENTMCNC: 33.3 G/DL (ref 31.5–35.7)
MCV RBC AUTO: 97 FL (ref 79–97)
MONOCYTES # BLD AUTO: 0.5 X10E3/UL (ref 0.1–0.9)
MONOCYTES NFR BLD AUTO: 8 %
NEUTROPHILS # BLD AUTO: 3.2 X10E3/UL (ref 1.4–7)
NEUTROPHILS NFR BLD AUTO: 53 %
PLATELET # BLD AUTO: 267 X10E3/UL (ref 150–450)
POTASSIUM SERPL-SCNC: 4.9 MMOL/L (ref 3.5–5.2)
PROT SERPL-MCNC: 6.3 G/DL (ref 6–8.5)
RBC # BLD AUTO: 3.85 X10E6/UL (ref 4.14–5.8)
SL AMB VLDL CHOLESTEROL CALC: 64 MG/DL (ref 5–40)
SODIUM SERPL-SCNC: 144 MMOL/L (ref 134–144)
TRIGL SERPL-MCNC: 431 MG/DL (ref 0–149)
WBC # BLD AUTO: 6 X10E3/UL (ref 3.4–10.8)

## 2024-04-18 ENCOUNTER — TELEPHONE (OUTPATIENT)
Dept: ENDOCRINOLOGY | Facility: HOSPITAL | Age: 74
End: 2024-04-18

## 2024-04-18 NOTE — RESULT ENCOUNTER NOTE
Please call the patient regarding result.  Hemoglobin A1c has improved to 6.2.  Blood cell and hemoglobin are just slightly low on CBC.  BUN and creatinine are elevated with a GFR that has lowered to 40.  Please stay hydrated with water.  Triglycerides have improved to 431 but remain elevated.

## 2024-04-18 NOTE — TELEPHONE ENCOUNTER
The patient was called and made aware of the test result review from the provider and a message was sent back to the provider via result notes to make him aware of how much water the patient has been drinking

## 2024-04-18 NOTE — TELEPHONE ENCOUNTER
----- Message from GUERRERO Mancera sent at 4/18/2024 11:24 AM EDT -----  Please call the patient regarding result.  Hemoglobin A1c has improved to 6.2.  Blood cell and hemoglobin are just slightly low on CBC.  BUN and creatinine are elevated with a GFR that has lowered to 40.  Please stay hydrated with water.  Triglycerides have improved to 431 but remain elevated.

## 2024-05-09 ENCOUNTER — TELEPHONE (OUTPATIENT)
Age: 74
End: 2024-05-09

## 2024-05-09 DIAGNOSIS — E11.65 TYPE 2 DIABETES MELLITUS WITH HYPERGLYCEMIA, WITH LONG-TERM CURRENT USE OF INSULIN (HCC): Primary | ICD-10-CM

## 2024-05-09 DIAGNOSIS — Z79.4 TYPE 2 DIABETES MELLITUS WITH HYPERGLYCEMIA, WITH LONG-TERM CURRENT USE OF INSULIN (HCC): Primary | ICD-10-CM

## 2024-05-09 RX ORDER — INSULIN GLARGINE 100 [IU]/ML
30 INJECTION, SOLUTION SUBCUTANEOUS
Qty: 9 ML | Refills: 5 | Status: SHIPPED | OUTPATIENT
Start: 2024-05-09 | End: 2024-06-08

## 2024-05-09 NOTE — TELEPHONE ENCOUNTER
Pt called in stating that he was unable to get his Lantus from the pharmacy. He states that they told him that [facility] did not approve it. Pt is wondering what happened. Please call pt and advise [phone]

## 2024-06-05 DIAGNOSIS — I10 PRIMARY HYPERTENSION: Primary | ICD-10-CM

## 2024-06-05 NOTE — TELEPHONE ENCOUNTER
Medication: NIFEdipine (PROCARDIA XL) 60 mg 24 hr tablet     Dose/Frequency: Take 60 mg by mouth daily     Quantity: 90    Pharmacy: [facility] - [address] [phone]     Office:   [x] PCP/Provider - [facility]   [] Speciality/Provider -     Does the patient have enough for 3 days?   [x] Yes   [] No - Send as HP to POD

## 2024-06-06 RX ORDER — NIFEDIPINE 60 MG/1
60 TABLET, EXTENDED RELEASE ORAL DAILY
Qty: 90 TABLET | Refills: 1 | Status: SHIPPED | OUTPATIENT
Start: 2024-06-06

## 2024-06-10 ENCOUNTER — TELEPHONE (OUTPATIENT)
Dept: FAMILY MEDICINE CLINIC | Facility: HOSPITAL | Age: 74
End: 2024-06-10

## 2024-06-10 NOTE — TELEPHONE ENCOUNTER
Pt states he has a sinus infection - can something be called in to [facility]?  There are no openings with anyone for the rest of the week.  Pt can be reached at [phone]

## 2024-06-11 DIAGNOSIS — J01.90 ACUTE NON-RECURRENT SINUSITIS, UNSPECIFIED LOCATION: Primary | ICD-10-CM

## 2024-06-11 RX ORDER — AZITHROMYCIN 250 MG/1
250 TABLET, FILM COATED ORAL DAILY
Qty: 6 TABLET | Refills: 0 | Status: SHIPPED | OUTPATIENT
Start: 2024-06-11 | End: 2024-06-16

## 2024-06-12 LAB
ALBUMIN SERPL-MCNC: 4.4 G/DL (ref 3.8–4.8)
ALBUMIN/GLOB SERPL: 1.9 {RATIO}
ALP SERPL-CCNC: 101 IU/L (ref 44–121)
ALT SERPL-CCNC: 49 IU/L (ref 0–44)
AST SERPL-CCNC: 22 IU/L (ref 0–40)
BASOPHILS # BLD AUTO: 0.1 X10E3/UL (ref 0–0.2)
BASOPHILS NFR BLD AUTO: 2 %
BILIRUB SERPL-MCNC: 0.5 MG/DL (ref 0–1.2)
BUN SERPL-MCNC: 25 MG/DL (ref 8–27)
BUN/CREAT SERPL: 13 (ref 10–24)
CALCIUM SERPL-MCNC: 9.2 MG/DL (ref 8.6–10.2)
CHLORIDE SERPL-SCNC: 103 MMOL/L (ref 96–106)
CO2 SERPL-SCNC: 27 MMOL/L (ref 20–29)
CREAT SERPL-MCNC: 1.92 MG/DL (ref 0.76–1.27)
EGFR: 36 ML/MIN/1.73
EOSINOPHIL # BLD AUTO: 0.2 X10E3/UL (ref 0–0.4)
EOSINOPHIL NFR BLD AUTO: 3 %
ERYTHROCYTE [DISTWIDTH] IN BLOOD BY AUTOMATED COUNT: 12.9 % (ref 11.6–15.4)
GLOBULIN SER-MCNC: 2.3 G/DL (ref 1.5–4.5)
GLUCOSE SERPL-MCNC: 124 MG/DL (ref 70–99)
HBA1C MFR BLD: 6.2 % (ref 4.8–5.6)
HCT VFR BLD AUTO: 39.3 % (ref 37.5–51)
HGB BLD-MCNC: 13.3 G/DL (ref 13–17.7)
IMM GRANULOCYTES # BLD: 0 X10E3/UL (ref 0–0.1)
IMM GRANULOCYTES NFR BLD: 0 %
LYMPHOCYTES # BLD AUTO: 2.2 X10E3/UL (ref 0.7–3.1)
LYMPHOCYTES NFR BLD AUTO: 39 %
MCH RBC QN AUTO: 32 PG (ref 26.6–33)
MCHC RBC AUTO-ENTMCNC: 33.8 G/DL (ref 31.5–35.7)
MCV RBC AUTO: 95 FL (ref 79–97)
MONOCYTES # BLD AUTO: 0.7 X10E3/UL (ref 0.1–0.9)
MONOCYTES NFR BLD AUTO: 13 %
NEUTROPHILS # BLD AUTO: 2.5 X10E3/UL (ref 1.4–7)
NEUTROPHILS NFR BLD AUTO: 43 %
PLATELET # BLD AUTO: 245 X10E3/UL (ref 150–450)
POTASSIUM SERPL-SCNC: 4.3 MMOL/L (ref 3.5–5.2)
PROT SERPL-MCNC: 6.7 G/DL (ref 6–8.5)
RBC # BLD AUTO: 4.15 X10E6/UL (ref 4.14–5.8)
SODIUM SERPL-SCNC: 141 MMOL/L (ref 134–144)
TSH SERPL DL<=0.005 MIU/L-ACNC: 1.5 UIU/ML (ref 0.45–4.5)
WBC # BLD AUTO: 5.7 X10E3/UL (ref 3.4–10.8)

## 2024-06-14 ENCOUNTER — APPOINTMENT (OUTPATIENT)
Dept: RADIOLOGY | Facility: CLINIC | Age: 74
End: 2024-06-14
Payer: COMMERCIAL

## 2024-06-14 ENCOUNTER — OFFICE VISIT (OUTPATIENT)
Dept: OBGYN CLINIC | Facility: CLINIC | Age: 74
End: 2024-06-14
Payer: COMMERCIAL

## 2024-06-14 ENCOUNTER — OFFICE VISIT (OUTPATIENT)
Dept: ENDOCRINOLOGY | Facility: HOSPITAL | Age: 74
End: 2024-06-14
Payer: COMMERCIAL

## 2024-06-14 VITALS
HEIGHT: 70 IN | HEART RATE: 75 BPM | DIASTOLIC BLOOD PRESSURE: 60 MMHG | SYSTOLIC BLOOD PRESSURE: 100 MMHG | BODY MASS INDEX: 28.52 KG/M2 | WEIGHT: 199.2 LBS

## 2024-06-14 VITALS
WEIGHT: 199 LBS | DIASTOLIC BLOOD PRESSURE: 75 MMHG | BODY MASS INDEX: 28.49 KG/M2 | SYSTOLIC BLOOD PRESSURE: 110 MMHG | HEART RATE: 60 BPM | HEIGHT: 70 IN

## 2024-06-14 DIAGNOSIS — E78.2 HYPERCHOLESTEROLEMIA WITH HYPERTRIGLYCERIDEMIA: ICD-10-CM

## 2024-06-14 DIAGNOSIS — M70.62 TROCHANTERIC BURSITIS OF LEFT HIP: Primary | ICD-10-CM

## 2024-06-14 DIAGNOSIS — E11.65 TYPE 2 DIABETES MELLITUS WITH HYPERGLYCEMIA, WITH LONG-TERM CURRENT USE OF INSULIN (HCC): Primary | ICD-10-CM

## 2024-06-14 DIAGNOSIS — E11.42 DIABETIC POLYNEUROPATHY ASSOCIATED WITH TYPE 2 DIABETES MELLITUS (HCC): ICD-10-CM

## 2024-06-14 DIAGNOSIS — I10 PRIMARY HYPERTENSION: ICD-10-CM

## 2024-06-14 DIAGNOSIS — M25.552 LEFT HIP PAIN: ICD-10-CM

## 2024-06-14 DIAGNOSIS — Z79.4 TYPE 2 DIABETES MELLITUS WITH HYPERGLYCEMIA, WITH LONG-TERM CURRENT USE OF INSULIN (HCC): Primary | ICD-10-CM

## 2024-06-14 DIAGNOSIS — R80.9 MICROALBUMINURIA: ICD-10-CM

## 2024-06-14 PROCEDURE — 73502 X-RAY EXAM HIP UNI 2-3 VIEWS: CPT

## 2024-06-14 PROCEDURE — 99214 OFFICE O/P EST MOD 30 MIN: CPT | Performed by: NURSE PRACTITIONER

## 2024-06-14 PROCEDURE — 99214 OFFICE O/P EST MOD 30 MIN: CPT | Performed by: STUDENT IN AN ORGANIZED HEALTH CARE EDUCATION/TRAINING PROGRAM

## 2024-06-14 NOTE — PROGRESS NOTES
"Hip New Office Note    Assessment:     1. Left hip pain    2. Trochanteric bursitis of left hip        Plan:     Problem List Items Addressed This Visit    None  Visit Diagnoses       Left hip pain    -  Primary    Relevant Orders    XR hip/pelv 2-3 vws left if performed    Ambulatory Referral to Physical Therapy    Trochanteric bursitis of left hip        Relevant Orders    Ambulatory Referral to Physical Therapy           74 y/o male with left hip pain secondary to abductor tendonitis and GT bursitis.  Xrays of the left hip reviewed today showing mild arthritic changes.   On physical exam he has good range of motion without pain.  He does have TTP over the GT bursa and the abductors on exam today.  We discussed that his symptoms are more consistent with gt bursitis and abductor tendonitis rather than hip arthritis.  HEP provided today as well as a script for formal PT.  At this time we do not recommend cortisone injection due to his DM and that it elevates his glucose.  Follow up as needed.    Subjective:     Patient ID: Fritz Colón is a 73 y.o. male.  Chief Complaint:  HPI:  73 y.o. male who presents today for an evaluation of his LEFT hip.  He states he has been having pain over the lateral aspect of his hip and into the buttock. He denies pain in the groin.  He has some \"pulsating nerve pain\" down the lateral aspect of the hip. He has not been requiring any medication for his hip pain and does not require ambulatory assistance.  He denies any recent trauma or injury to the hip.    He has a history of a Right BRANDEE which is doing very well.       Allergy:  Allergies   Allergen Reactions    Mushroom Extract Complex - Food Allergy Anaphylaxis    Nitroglycerin Vomiting and Headache     Medications:  all current active meds have been reviewed  Past Medical History:  Past Medical History:   Diagnosis Date    Arthritis     Cervical disc disease     C2-7, needs repair    Coronary artery disease     angina is pain in " throat    Susanna Hernandez infection     in 40s with liver affects    Hypertension     Kidney stone      Past Surgical History:  Past Surgical History:   Procedure Laterality Date    ANKLE ARTHROSCOPY Bilateral     APPENDECTOMY      CATARACT EXTRACTION, BILATERAL Bilateral     CHOLECYSTECTOMY      CORONARY ANGIOPLASTY WITH STENT PLACEMENT      2 stents in LAD    ELBOW ARTHROSCOPY Bilateral     with right elbow tear repaired    ERCP      KNEE ARTHROSCOPY Right     LITHOTRIPSY      LUMBAR LAMINECTOMY      5 times    MULTIPLE TOOTH EXTRACTIONS      NOSE SURGERY  2021    fracture post a fall    IA ARTHRP ACETBLR/PROX FEM PROSTC AGRFT/ALGRFT Right 11/09/2022    Procedure: ARTHROPLASTY HIP TOTAL ANTERIOR,NAVIGATED- SAME DAY;  Surgeon: Servando Levi DO;  Location:  MAIN OR;  Service: Orthopedics    REPLACEMENT TOTAL KNEE Left     SINUS SURGERY      THUMB FUSION Bilateral     thumb repair with bone grafts    TONSILLECTOMY AND ADENOIDECTOMY       Family History:  Family History   Problem Relation Age of Onset    Uterine cancer Mother     Colon cancer Father     Cancer Sister         corneal    No Known Problems Brother      Social History:  Social History     Substance and Sexual Activity   Alcohol Use Yes    Comment: Maybe 1 drink every 3-4 weeks     Social History     Substance and Sexual Activity   Drug Use Never     Social History     Tobacco Use   Smoking Status Former    Current packs/day: 1.50    Average packs/day: 1.5 packs/day for 25.0 years (37.5 ttl pk-yrs)    Types: Cigarettes   Smokeless Tobacco Never   Tobacco Comments    Quit at age 27           ROS:  General: Per HPI  Skin: Negative, except if noted below  HEENT: Negative  Respiratory: Negative  Cardiovascular: Negative  Gastrointestinal: Negative  Urinary: Negative  Vascular: Negative  Musculoskeletal: Positive per HPI   Neurologic: Positive per HPI  Endocrine: Negative    Objective:  BP Readings from Last 1 Encounters:   06/14/24 110/75      Wt  "Readings from Last 1 Encounters:   06/14/24 90.3 kg (199 lb)        Respiratory:   non-labored respirations    Lymphatics:  no palpable lymph nodes    Gait and Station:   normal    Neurologic:   Alert and oriented times 3  Patient with normal sensation except as noted below  Deep tendon reflexes 2+ except as noted in MSK exam    Bilateral Lower Extremity:  Left Hip     Inspection: skin intact    Range of Motion: full wo pain    - log roll    - Trendelenburg sign   +TTP over GT bursa    Motor: 5/5 Q/HS/TA/GS/P    Pulses: 2+ DP / 2+ PT    SILT DP/SP/S/S/TN      Imaging:  My interpretation XR AP pelvis/ left hip: mild joint space narrowing, subchondral sclerosis, subchondral cysts, osteophyte formation. No fracture or dislocation. Stable press-fit right total hip arthroplasty.     BMI:   Estimated body mass index is 28.55 kg/m² as calculated from the following:    Height as of this encounter: 5' 10\" (1.778 m).    Weight as of this encounter: 90.3 kg (199 lb).  BSA:   Estimated body surface area is 2.08 meters squared as calculated from the following:    Height as of this encounter: 5' 10\" (1.778 m).    Weight as of this encounter: 90.3 kg (199 lb).           Scribe Attestation      I,:  Carole Soto PA-C am acting as a scribe while in the presence of the attending physician.:       I,:  Servando Levi, DO personally performed the services described in this documentation    as scribed in my presence.:             "

## 2024-06-14 NOTE — PROGRESS NOTES
Fritz Colón 73 y.o. male MRN: [id number]    Encounter: [id number]      Assessment & Plan     Assessment:  This is a 73 y.o.-year-old male with type 2 diabetes with neuropathy, hypertension and hyperlipidemia.       Plan:  1. Type 2 diabetes, insulin requiring: Most recent hemoglobin A1c is 6.2. Review of his Dexcom continuous glucose monitor reveals hyperglycemia following breakfast with some hypoglycemia following dinner.  For this I have asked him to continue NovoLog to 17 units at breakfast, continue lunch and 13  units and decrease to 13 units at dinner. He can continue his sliding scale and his Lantus at current dose.  Continue to utilize Dexcom to monitor glucose levels come to the office in 2 weeks for a download. Contact the office with any concerns or questions.  Check hemoglobin A1c prior to next visit.     2. Diabetic neuropathy:  Diabetic foot exam performed at previous office visit.     3. Hypertension:  He is normotensive in the office today.  Continue current regimen.  Check comprehensive metabolic panel now and prior to next visit.     4. Hyperlipidemia: Triglycerides were elevated but improved to 431.  He was then started on Vascepa 2 g twice daily.  Check fasting lipid panel prior to next visit.    CC: Type 2 Diabetes follow up    History of Present Illness     HPI:  73 year old male with type 2 diabetes, insulin requiring for 20 years with hypertension and hyperlipidemia of hypertriglyceridemia for follow up.  He was diagnosed with diabetes about 20 years ago. He has started Omnipod therapy since his last visit and discontinued it shortly thereafter.  Most recent hemoglobin A1c from June 11, 2024 is 6.2.  He is status post right hip replacement from November 8, 2022.  He denies any polyuria, polydipsia, polyphagia, and blurry vision.  He has once or twice a night nocturia.  He has a bit of numbness of the feet and will feel unsteady at times and has fallen multiple times.  He denies chest pain  or shortness of breath.  He denies nephropathy, retinopathy, heart attack, stroke and claudication but does admit to neuropathy, coronary artery disease and TIAs. Initial concern was a TIA, but was diagnosed with Bell palsy. He is S/P right sided total hip replacement from November 2022.          Hypoglycemic episodes: After dinner at times recently.   H/o of hypoglycemia causing hospitalization or intervention such as glucagon injection  or ambulance call  No.  Hypoglycemia symptoms: jitteriness, sweating and lightheaded.  Treatment of hypoglycemia: glucose packets.  Glucagon:No.  Medic alert tag: recommended,Yes.      The patient's last eye exam was in November 2021 with no retinopathy.  The patient's last foot exam was performed at his office visit on December 2, 2021.      Blood Sugar/Glucometer/Pump/CGM review:  Download of Dexcom from June 1 through June 14, 2024 reveals an average glucose level of 140 with the standard deviation of 52.  He is in target range 83% time, below target range <5% time, above target range 22 % time.     He has hypertension and takes losartan 50 mg daily and Bystolic 20 mg daily.  He denies headache or stroke-like symptoms.     He has hyperlipidemia with significant hypertriglyceridemia as high as 2400 and takes simvastatin 20 mg daily, Zetia 10 mg daily, and Vascepa 2 g - 2 times a day.  He denies chest pain or shortness of breath.     Review of Systems   Constitutional: Negative.  Negative for chills, fatigue and fever.   HENT: Negative.  Negative for trouble swallowing and voice change.    Eyes:  Negative for photophobia, pain, discharge, redness, itching and visual disturbance.   Respiratory:  Negative for cough and shortness of breath.    Cardiovascular:  Negative for chest pain and palpitations.   Gastrointestinal:  Negative for abdominal pain, constipation, diarrhea, nausea and vomiting.   Endocrine: Negative for cold intolerance, heat intolerance, polydipsia and polyphagia.    Genitourinary: Negative.    Musculoskeletal: Negative.    Skin: Negative.    Allergic/Immunologic: Negative.    Neurological:  Negative for dizziness, syncope, light-headedness and headaches.   Hematological: Negative.    Psychiatric/Behavioral: Negative.     All other systems reviewed and are negative.      Historical Information   Past Medical History:   Diagnosis Date    Arthritis     Cervical disc disease     C2-7, needs repair    Coronary artery disease     angina is pain in throat    Susanna Hernandez infection     in 40s with liver affects    Hypertension     Kidney stone      Past Surgical History:   Procedure Laterality Date    ANKLE ARTHROSCOPY Bilateral     APPENDECTOMY      CATARACT EXTRACTION, BILATERAL Bilateral     CHOLECYSTECTOMY      CORONARY ANGIOPLASTY WITH STENT PLACEMENT      2 stents in LAD    ELBOW ARTHROSCOPY Bilateral     with right elbow tear repaired    ERCP      KNEE ARTHROSCOPY Right     LITHOTRIPSY      LUMBAR LAMINECTOMY      5 times    MULTIPLE TOOTH EXTRACTIONS      NOSE SURGERY  2021    fracture post a fall    ME ARTHRP ACETBLR/PROX FEM PROSTC AGRFT/ALGRFT Right 11/09/2022    Procedure: ARTHROPLASTY HIP TOTAL ANTERIOR,NAVIGATED- SAME DAY;  Surgeon: Servando Levi DO;  Location:  MAIN OR;  Service: Orthopedics    REPLACEMENT TOTAL KNEE Left     SINUS SURGERY      THUMB FUSION Bilateral     thumb repair with bone grafts    TONSILLECTOMY AND ADENOIDECTOMY       Social History   Social History     Substance and Sexual Activity   Alcohol Use Yes    Comment: Maybe 1 drink every 3-4 weeks     Social History     Substance and Sexual Activity   Drug Use Never     Social History     Tobacco Use   Smoking Status Former    Current packs/day: 1.50    Average packs/day: 1.5 packs/day for 25.0 years (37.5 ttl pk-yrs)    Types: Cigarettes   Smokeless Tobacco Never   Tobacco Comments    Quit at age 27     Family History:   Family History   Problem Relation Age of Onset    Uterine cancer Mother      Colon cancer Father     Cancer Sister         corneal    No Known Problems Brother        Meds/Allergies   Current Outpatient Medications   Medication Sig Dispense Refill    allopurinol (ZYLOPRIM) 300 mg tablet Take 300 mg by mouth daily      amitriptyline (ELAVIL) 50 mg tablet Take 1 tablet (50 mg total) by mouth daily at bedtime 90 tablet 1    aspirin (ECOTRIN LOW STRENGTH) 81 mg EC tablet Take 1 tablet (81 mg total) by mouth 2 (two) times a day 60 tablet 0    atorvastatin (LIPITOR) 40 mg tablet Take 1 tablet (40 mg total) by mouth daily 90 tablet 1    azithromycin (ZITHROMAX) 250 mg tablet Take 1 tablet (250 mg total) by mouth daily for 5 days 2 pills today, then one daily for 4 more days 6 tablet 0    ezetimibe (ZETIA) 10 mg tablet Take 10 mg by mouth daily      fenofibrate (TRICOR) 54 MG tablet Take 1 tablet (54 mg total) by mouth daily 30 tablet 6    HYDROcodone-acetaminophen (NORCO) 5-325 mg per tablet       Icosapent Ethyl (Vascepa) 1 g CAPS Take 2 capsules (2 g total) by mouth 2 (two) times a day 120 capsule 6    insulin aspart (NovoLOG FlexPen) 100 UNIT/ML injection pen INJECT 15 UNITS SUBCUTANEOUSLY WITH BREAKFAST AND LUNCH WITH 13 UNITS WITH DINNER PLUS SLIDING SCALE USE UP TO 65 UNITS DAILY 60 mL 1    Klor-Con M20 20 MEQ tablet Take 1 tablet (20 mEq total) by mouth daily 90 tablet 1    Lantus SoloStar 100 units/mL SOPN Inject 0.3 mL (30 Units total) under the skin daily at bedtime 9 mL 5    Magnesium 400 MG TABS Take by mouth daily        Multiple Vitamins-Minerals (Multivitamin Men 50+) TABS Take by mouth daily      nebivolol (BYSTOLIC) 5 mg tablet Take 1 tablet (5 mg total) by mouth daily 90 tablet 3    NIFEdipine (PROCARDIA XL) 60 mg 24 hr tablet Take 1 tablet (60 mg total) by mouth daily 90 tablet 1    NovoLOG 100 UNIT/ML injection Up to 80 units per day via pump (Patient not taking: Reported on 2/9/2024) 10 mL 3     No current facility-administered medications for this visit.     Allergies  "  Allergen Reactions    Mushroom Extract Complex - Food Allergy Anaphylaxis    Nitroglycerin Vomiting and Headache       Objective   Vitals: Blood pressure 100/60, pulse 75, height 5' 10\" (1.778 m), weight 90.4 kg (199 lb 3.2 oz).    Physical Exam  Vitals reviewed.   Constitutional:       Appearance: He is well-developed.   HENT:      Head: Normocephalic and atraumatic.      Nose: Nose normal.   Eyes:      Conjunctiva/sclera: Conjunctivae normal.      Pupils: Pupils are equal, round, and reactive to light.      Comments: Wears glasses   Cardiovascular:      Rate and Rhythm: Normal rate and regular rhythm.      Pulses: no weak pulses.           Dorsalis pedis pulses are 1+ on the right side and 1+ on the left side.        Posterior tibial pulses are 1+ on the right side and 1+ on the left side.      Heart sounds: Normal heart sounds.   Pulmonary:      Effort: Pulmonary effort is normal.      Breath sounds: Normal breath sounds.   Abdominal:      General: Bowel sounds are normal.      Palpations: Abdomen is soft.   Musculoskeletal:         General: Normal range of motion.      Cervical back: Normal range of motion and neck supple.   Feet:      Right foot:      Skin integrity: No ulcer, skin breakdown, erythema, warmth, callus or dry skin.      Left foot:      Skin integrity: No ulcer, skin breakdown, erythema, warmth, callus or dry skin.   Skin:     General: Skin is warm and dry.   Neurological:      Mental Status: He is alert and oriented to person, place, and time.   Psychiatric:         Behavior: Behavior normal.         Thought Content: Thought content normal.         Judgment: Judgment normal.       Patient's shoes and socks removed.    Right Foot/Ankle   Right Foot Inspection  Skin Exam: skin normal and skin intact. No dry skin, no warmth, no callus, no erythema, no maceration, no abnormal color, no pre-ulcer, no ulcer and no callus.     Toe Exam: ROM and strength within normal limits.     Sensory   Monofilament " testing: intact    Vascular  Capillary refills: < 3 seconds  The right DP pulse is 1+. The right PT pulse is 1+.     Left Foot/Ankle  Left Foot Inspection  Skin Exam: skin normal and skin intact. No dry skin, no warmth, no erythema, no maceration, normal color, no pre-ulcer, no ulcer and no callus.     Toe Exam: ROM and strength within normal limits.     Sensory   Monofilament testing: intact    Vascular  Capillary refills: < 3 seconds  The left DP pulse is 1+. The left PT pulse is 1+.     Assign Risk Category  No deformity present  No loss of protective sensation  No weak pulses  Risk: 0      Lab Results:   Lab Results   Component Value Date/Time    Hemoglobin A1C 6.2 (H) 06/11/2024 09:52 AM    Hemoglobin A1C 6.2 (H) 04/16/2024 07:48 AM    Hemoglobin A1C 6.6 (H) 11/29/2023 10:13 AM    White Blood Cell Count 5.7 06/11/2024 09:52 AM    White Blood Cell Count 6.0 04/16/2024 07:48 AM    White Blood Cell Count 8.2 11/29/2023 10:13 AM    Hemoglobin 13.3 06/11/2024 09:52 AM    Hemoglobin 12.5 (L) 04/16/2024 07:48 AM    Hemoglobin 13.1 11/29/2023 10:13 AM    HCT 39.3 06/11/2024 09:52 AM    HCT 37.5 04/16/2024 07:48 AM    HCT 37.5 11/29/2023 10:13 AM    MCV 95 06/11/2024 09:52 AM    MCV 97 04/16/2024 07:48 AM    MCV 93 11/29/2023 10:13 AM    Platelet Count 245 06/11/2024 09:52 AM    Platelet Count 267 04/16/2024 07:48 AM    Platelet Count 266 11/29/2023 10:13 AM    BUN 25 06/11/2024 09:52 AM    BUN 33 (H) 04/16/2024 07:48 AM    BUN 20 11/29/2023 10:13 AM    Potassium 4.3 06/11/2024 09:52 AM    Potassium 4.9 04/16/2024 07:48 AM    Potassium 4.4 11/29/2023 10:13 AM    Chloride 103 06/11/2024 09:52 AM    Chloride 106 04/16/2024 07:48 AM    Chloride 105 11/29/2023 10:13 AM    CO2 27 06/11/2024 09:52 AM    CO2 24 04/16/2024 07:48 AM    CO2 21 11/29/2023 10:13 AM    Creatinine 1.92 (H) 06/11/2024 09:52 AM    Creatinine 1.79 (H) 04/16/2024 07:48 AM    Creatinine 1.43 (H) 11/29/2023 10:13 AM    AST 22 06/11/2024 09:52 AM    AST 24  "04/16/2024 07:48 AM    AST 18 11/29/2023 10:13 AM    ALT 49 (H) 06/11/2024 09:52 AM    ALT 18 04/16/2024 07:48 AM    ALT 18 11/29/2023 10:13 AM    Protein, Total 6.7 06/11/2024 09:52 AM    Protein, Total 6.3 04/16/2024 07:48 AM    Protein, Total 6.7 11/29/2023 10:13 AM    Albumin 4.4 06/11/2024 09:52 AM    Albumin 4.4 04/16/2024 07:48 AM    Albumin 4.6 11/29/2023 10:13 AM    Globulin, Total 2.3 06/11/2024 09:52 AM    Globulin, Total 1.9 04/16/2024 07:48 AM    Globulin, Total 2.1 11/29/2023 10:13 AM    HDL 31 (L) 04/16/2024 07:48 AM    HDL 31 (L) 11/29/2023 10:13 AM    HDL 39 (L) 07/19/2023 10:41 AM    Triglycerides 431 (H) 04/16/2024 07:48 AM    Triglycerides 925 (HH) 11/29/2023 10:13 AM    Triglycerides 337 (H) 07/19/2023 10:41 AM     Portions of the record may have been created with voice recognition software. Occasional wrong word or \"sound a like\" substitutions may have occurred due to the inherent limitations of voice recognition software. Read the chart carefully and recognize, using context, where substitutions have occurred.    "

## 2024-06-14 NOTE — PATIENT INSTRUCTIONS
Be mindful of diet.     Stay active and stay hydrated.    Continue Lantus at 30 units.     Continue NovoLog 17 units with breakfast and decrease to 13 units at lunch.     Decrease NovoLog to 12 units at dinner with sliding scale as follows at meals:     200-250 = 2 units  251-300 = 4 units  301-350 = 6 units  351-400 = 8 units  401-450 = 10 units     Continue DEXCOM G7.     DO NOT GIVE NOVOLOG AT BEDTIME AS IT CAUSES HYPOGLYCEMIA OVERNIGHT AND IT IS DANGEROUS !!!!!     Send a report to the office in 2 weeks for review.     Call with any concerns or questions.

## 2024-07-30 ENCOUNTER — TELEPHONE (OUTPATIENT)
Dept: ADMINISTRATIVE | Facility: OTHER | Age: 74
End: 2024-07-30

## 2024-07-30 NOTE — TELEPHONE ENCOUNTER
07/30/24 12:25 PM    Patient was flagged by a Third Party Payer report as being due for a refill on the following medications,   ATORVASTATIN 40 MG TABLET   . Patient was contacted to review these medications. There was no answer and a message was left.     Thank you.  Darshan Patel  [facility]

## 2024-08-13 DIAGNOSIS — E11.65 TYPE 2 DIABETES MELLITUS WITH HYPERGLYCEMIA, WITH LONG-TERM CURRENT USE OF INSULIN (HCC): ICD-10-CM

## 2024-08-13 DIAGNOSIS — E78.2 HYPERCHOLESTEROLEMIA WITH HYPERTRIGLYCERIDEMIA: ICD-10-CM

## 2024-08-13 DIAGNOSIS — Z79.4 TYPE 2 DIABETES MELLITUS WITH HYPERGLYCEMIA, WITH LONG-TERM CURRENT USE OF INSULIN (HCC): ICD-10-CM

## 2024-08-13 NOTE — TELEPHONE ENCOUNTER
Reason for call:   [x] Refill   [] Prior Auth  [] Other:     Office:   [] PCP/Provider -   [x] Specialty/Provider - Endo    Medication:         Pharmacy: [facility]     Does the patient have enough for 3 days?   [x] Yes   [] No - Send as HP to POD

## 2024-08-14 RX ORDER — ICOSAPENT ETHYL 1 G/1
2 CAPSULE ORAL 2 TIMES DAILY
Qty: 120 CAPSULE | Refills: 0 | Status: SHIPPED | OUTPATIENT
Start: 2024-08-14

## 2024-08-14 RX ORDER — INSULIN ASPART 100 [IU]/ML
INJECTION, SOLUTION INTRAVENOUS; SUBCUTANEOUS
Qty: 60 ML | Refills: 1 | Status: SHIPPED | OUTPATIENT
Start: 2024-08-14

## 2024-08-22 ENCOUNTER — RA CDI HCC (OUTPATIENT)
Dept: OTHER | Facility: HOSPITAL | Age: 74
End: 2024-08-22

## 2024-08-22 PROBLEM — E11.22 TYPE 2 DIABETES MELLITUS WITH CHRONIC KIDNEY DISEASE (HCC): Status: ACTIVE | Noted: 2024-08-22

## 2024-08-22 PROBLEM — I12.9 HYPERTENSIVE CKD (CHRONIC KIDNEY DISEASE): Status: ACTIVE | Noted: 2021-12-02

## 2024-08-22 PROBLEM — I12.9 HYPERTENSIVE CKD (CHRONIC KIDNEY DISEASE): Status: ACTIVE | Noted: 2024-08-22

## 2024-08-22 NOTE — PROGRESS NOTES
HCC coding opportunities          Chart Reviewed number of suggestions sent to Provider: 1  E11.22  I12.9     Patients Insurance     Medicare Insurance: [employer]

## 2024-08-26 ENCOUNTER — TELEPHONE (OUTPATIENT)
Dept: FAMILY MEDICINE CLINIC | Facility: HOSPITAL | Age: 74
End: 2024-08-26

## 2024-08-26 NOTE — TELEPHONE ENCOUNTER
Pt states he has an appt on Wednesday and would like an order to get a PSA done beforehand.  [facility]

## 2024-08-29 ENCOUNTER — OFFICE VISIT (OUTPATIENT)
Dept: FAMILY MEDICINE CLINIC | Facility: HOSPITAL | Age: 74
End: 2024-08-29
Payer: COMMERCIAL

## 2024-08-29 VITALS
WEIGHT: 196 LBS | BODY MASS INDEX: 28.06 KG/M2 | SYSTOLIC BLOOD PRESSURE: 162 MMHG | HEART RATE: 91 BPM | OXYGEN SATURATION: 98 % | DIASTOLIC BLOOD PRESSURE: 90 MMHG | HEIGHT: 70 IN

## 2024-08-29 DIAGNOSIS — N18.32 STAGE 3B CHRONIC KIDNEY DISEASE (HCC): ICD-10-CM

## 2024-08-29 DIAGNOSIS — Z00.00 MEDICARE ANNUAL WELLNESS VISIT, SUBSEQUENT: Primary | ICD-10-CM

## 2024-08-29 DIAGNOSIS — F11.20 CONTINUOUS OPIOID DEPENDENCE (HCC): ICD-10-CM

## 2024-08-29 DIAGNOSIS — I71.21 ANEURYSM OF ASCENDING AORTA WITHOUT RUPTURE (HCC): ICD-10-CM

## 2024-08-29 DIAGNOSIS — Z80.42 FAMILY HISTORY OF PROSTATE CANCER IN FATHER: ICD-10-CM

## 2024-08-29 DIAGNOSIS — Z12.5 ENCOUNTER FOR SCREENING FOR MALIGNANT NEOPLASM OF PROSTATE: ICD-10-CM

## 2024-08-29 DIAGNOSIS — E27.40 ADRENAL INSUFFICIENCY (HCC): ICD-10-CM

## 2024-08-29 DIAGNOSIS — N41.0 ACUTE PROSTATITIS: ICD-10-CM

## 2024-08-29 PROCEDURE — G0439 PPPS, SUBSEQ VISIT: HCPCS | Performed by: INTERNAL MEDICINE

## 2024-08-29 PROCEDURE — 99214 OFFICE O/P EST MOD 30 MIN: CPT | Performed by: INTERNAL MEDICINE

## 2024-08-29 RX ORDER — GABAPENTIN 100 MG/1
100 CAPSULE ORAL
COMMUNITY

## 2024-08-29 RX ORDER — LEVOFLOXACIN 500 MG/1
500 TABLET, FILM COATED ORAL EVERY 24 HOURS
Qty: 10 TABLET | Refills: 0 | Status: SHIPPED | OUTPATIENT
Start: 2024-08-29 | End: 2024-09-08

## 2024-08-29 NOTE — PROGRESS NOTES
Ambulatory Visit  Name: Fritz Colón      : 1950      MRN: [id number]  Encounter Provider: Lisa Brooks DO  Encounter Date: 2024   Encounter department: [facility] [address]    Assessment & Plan   1. Medicare annual wellness visit, subsequent  2. Acute prostatitis  -     Ambulatory referral to Urology; Future  -     CT renal stone study abdomen pelvis wo contrast; Future; Expected date: 2024  -     Ambulatory Referral to Nephrology; Future  -     CBC and differential; Future  -     Comprehensive metabolic panel; Future  -     PSA, Total Screen; Future  -     Blood culture; Future  -     levofloxacin (LEVAQUIN) 500 mg tablet; Take 1 tablet (500 mg total) by mouth every 24 hours for 10 days  3. Family history of prostate cancer in father  Comments:  father  and 3 borhters developed in their 50's  Orders:  -     Ambulatory referral to Urology; Future  4. Continuous opioid dependence (HCC)  Assessment & Plan:  On hydrocodone up to 4 x day with ruematology  5. Aneurysm of ascending aorta without rupture (HCC)  Assessment & Plan:  Will check ct scan  6. Adrenal insufficiency (HCC)  Assessment & Plan:  Sees  endocrinology in 3 weeks  7. Stage 3b chronic kidney disease (HCC)  Assessment & Plan:  Lab Results   Component Value Date    EGFR 36 (L) 2024    EGFR 40 (L) 2024    EGFR 52 (L) 2023    CREATININE 1.92 (H) 2024    CREATININE 1.79 (H) 2024    CREATININE 1.43 (H) 2023   Has seen Dr. Rodrigues in past   8. Encounter for screening for malignant neoplasm of prostate  -     PSA, Total Screen; Future       Preventive health issues were discussed with patient, and age appropriate screening tests were ordered as noted in patient's After Visit Summary. Personalized health advice and appropriate referrals for health education or preventive services given if needed, as noted in patient's After Visit Summary.    History of Present Illness     Here for   medicare wellness   Also has pain in lower abdomen and low pain with ejaculation or with voiding- has had several prior episodes of prostatitis  and this feels similar   This feels similar to prior episodes- voided x 4 last night with difficulty passing urine       Patient Care Team:  Lisa Brooks DO as PCP - General (Internal Medicine)  Minal Crespo MD as PCP - Endocrinology (Endocrinology)  GUERRERO Mancera (Endocrinology)  Jesus Ordaz PA-C (Endocrinology)  Ayana Marley RD as Diabetes Educator (Dietician)  Mallory Rodrigues DO (Nephrology)    Review of Systems   Constitutional:  Negative for fatigue.   HENT:  Negative for congestion.    Respiratory:  Negative for shortness of breath.    Genitourinary:  Positive for difficulty urinating and penile pain. Negative for penile swelling and scrotal swelling.   All other systems reviewed and are negative.    Medical History Reviewed by provider this encounter:       Annual Wellness Visit Questionnaire   Fritz is here for his Subsequent Wellness visit.     Health Risk Assessment:   Patient rates overall health as fair. Patient feels that their physical health rating is slightly better. Patient is very satisfied with their life. Eyesight was rated as same. Hearing was rated as same. Patient feels that their emotional and mental health rating is much better. Patients states they are never, rarely angry. Patient states they are sometimes unusually tired/fatigued. Pain experienced in the last 7 days has been some. Patient's pain rating has been 7/10. Patient states that he has experienced no weight loss or gain in last 6 months.     Depression Screening:   PHQ-2 Score: 0      Fall Risk Screening:   In the past year, patient has experienced: no history of falling in past year      Home Safety:  Patient does not have trouble with stairs inside or outside of their home. Patient has working smoke alarms and has working carbon monoxide detector. Home safety hazards  include: none.     Nutrition:   Current diet is Diabetic.     Medications:   Patient is currently taking over-the-counter supplements. OTC medications include: see medication list. Patient is able to manage medications.     Activities of Daily Living (ADLs)/Instrumental Activities of Daily Living (IADLs):   Walk and transfer into and out of bed and chair?: Yes  Dress and groom yourself?: Yes    Bathe or shower yourself?: Yes    Feed yourself? Yes  Do your laundry/housekeeping?: Yes  Manage your money, pay your bills and track your expenses?: Yes  Make your own meals?: Yes    Do your own shopping?: Yes    Previous Hospitalizations:   Any hospitalizations or ED visits within the last 12 months?: No      Advance Care Planning:   Living will: Yes    Durable POA for healthcare: Yes    Advanced directive: Yes    Advanced directive counseling given: Yes    End of Life Decisions reviewed with patient: Yes    Provider agrees with end of life decisions: Yes      PREVENTIVE SCREENINGS      Cardiovascular Screening:    General: Screening Not Indicated and History Lipid Disorder      Diabetes Screening:     General: Screening Not Indicated and History Diabetes      Abdominal Aortic Aneurysm (AAA) Screening:    Risk factors include: age between 65-74 yo and tobacco use      Screening, Brief Intervention, and Referral to Treatment (SBIRT)    Screening      AUDIT-C Screenin) How often did you have a drink containing alcohol in the past year? monthly or less  2) How many drinks did you have on a typical day when you were drinking in the past year? 1 to 2  3) How often did you have 6 or more drinks on one occasion in the past year? never    AUDIT-C Score: 1  Interpretation: Score 0-3 (male): Negative screen for alcohol misuse    Single Item Drug Screening:  How often have you used an illegal drug (including marijuana) or a prescription medication for non-medical reasons in the past year? never    Single Item Drug Screen Score:  "0  Interpretation: Negative screen for possible drug use disorder    Review of Current Opioid Use    Opioid Risk Tool (ORT) Interpretation: Complete Opioid Risk Tool (ORT)    Social Determinants of Health     Financial Resource Strain: Low Risk  (3/1/2023)    Overall Financial Resource Strain (CARDIA)    • Difficulty of Paying Living Expenses: Not hard at all   Food Insecurity: No Food Insecurity (8/29/2024)    Hunger Vital Sign    • Worried About Running Out of Food in the Last Year: Never true    • Ran Out of Food in the Last Year: Never true   Transportation Needs: No Transportation Needs (8/29/2024)    PRAPARE - Transportation    • Lack of Transportation (Medical): No    • Lack of Transportation (Non-Medical): No   Housing Stability: Unknown (8/29/2024)    Housing Stability Vital Sign    • Unable to Pay for Housing in the Last Year: No    • Homeless in the Last Year: No   Utilities: Not At Risk (8/29/2024)    [facility] Utilities    • Threatened with loss of utilities: No     No results found.    Objective     /90   Pulse 91   Ht 5' 10\" (1.778 m)   Wt 88.9 kg (196 lb)   SpO2 98%   BMI 28.12 kg/m²     Physical Exam  Vitals and nursing note reviewed.   Constitutional:       General: He is not in acute distress.  HENT:      Head: Normocephalic.      Right Ear: There is no impacted cerumen.      Left Ear: There is no impacted cerumen.      Nose: No congestion.      Mouth/Throat:      Pharynx: No oropharyngeal exudate or posterior oropharyngeal erythema.   Eyes:      General:         Right eye: No discharge (levaquin).         Left eye: No discharge.      Extraocular Movements: Extraocular movements intact.      Pupils: Pupils are equal, round, and reactive to light.   Cardiovascular:      Rate and Rhythm: Normal rate and regular rhythm.      Heart sounds: No murmur heard.  Pulmonary:      Breath sounds: No wheezing or rhonchi.   Abdominal:      General: There is distension.      Palpations: Abdomen is soft.      " Tenderness: There is abdominal tenderness. There is no right CVA tenderness or left CVA tenderness.   Musculoskeletal:         General: No swelling or tenderness.      Cervical back: No rigidity or tenderness.      Right lower leg: No edema.      Left lower leg: No edema.   Skin:     Findings: No rash.   Neurological:      Mental Status: He is alert and oriented to person, place, and time.      Motor: No weakness.   Psychiatric:         Mood and Affect: Mood normal.         Thought Content: Thought content normal.         Judgment: Judgment normal.

## 2024-08-29 NOTE — ASSESSMENT & PLAN NOTE
Lab Results   Component Value Date    EGFR 36 (L) 06/11/2024    EGFR 40 (L) 04/16/2024    EGFR 52 (L) 11/29/2023    CREATININE 1.92 (H) 06/11/2024    CREATININE 1.79 (H) 04/16/2024    CREATININE 1.43 (H) 11/29/2023   Has seen Dr. Rodrigues in past

## 2024-08-29 NOTE — PATIENT INSTRUCTIONS
Medicare Preventive Visit Patient Instructions  Thank you for completing your Welcome to Medicare Visit or Medicare Annual Wellness Visit today. Your next wellness visit will be due in one year (8/30/2025).  The screening/preventive services that you may require over the next 5-10 years are detailed below. Some tests may not apply to you based off risk factors and/or age. Screening tests ordered at today's visit but not completed yet may show as past due. Also, please note that scanned in results may not display below.  Preventive Screenings:  Service Recommendations Previous Testing/Comments   Colorectal Cancer Screening  Colonoscopy    Fecal Occult Blood Test (FOBT)/Fecal Immunochemical Test (FIT)  Fecal DNA/Cologuard Test  Flexible Sigmoidoscopy Age: 45-75 years old   Colonoscopy: every 10 years (May be performed more frequently if at higher risk)  OR  FOBT/FIT: every 1 year  OR  Cologuard: every 3 years  OR  Sigmoidoscopy: every 5 years  Screening may be recommended earlier than age 45 if at higher risk for colorectal cancer. Also, an individualized decision between you and your healthcare provider will decide whether screening between the ages of 76-85 would be appropriate. Colonoscopy: Not on file  FOBT/FIT: Not on file  Cologuard: Not on file  Sigmoidoscopy: Not on file          Prostate Cancer Screening Individualized decision between patient and health care provider in men between ages of 55-69   Medicare will cover every 12 months beginning on the day after your 50th birthday PSA: No results in last 5 years           Hepatitis C Screening Once for adults born between 1945 and 1965  More frequently in patients at high risk for Hepatitis C Hep C Antibody: Not on file        Diabetes Screening 1-2 times per year if you're at risk for diabetes or have pre-diabetes Fasting glucose: 264 mg/dL (11/9/2022)  A1C: 6.2 % (6/11/2024)  Screening Not Indicated  History Diabetes   Cholesterol Screening Once every 5 years  if you don't have a lipid disorder. May order more often based on risk factors. Lipid panel: 04/16/2024  Screening Not Indicated  History Lipid Disorder      Other Preventive Screenings Covered by Medicare:  Abdominal Aortic Aneurysm (AAA) Screening: covered once if your at risk. You're considered to be at risk if you have a family history of AAA or a male between the age of 65-75 who smoking at least 100 cigarettes in your lifetime.  Lung Cancer Screening: covers low dose CT scan once per year if you meet all of the following conditions: (1) Age 55-77; (2) No signs or symptoms of lung cancer; (3) Current smoker or have quit smoking within the last 15 years; (4) You have a tobacco smoking history of at least 20 pack years (packs per day x number of years you smoked); (5) You get a written order from a healthcare provider.  Glaucoma Screening: covered annually if you're considered high risk: (1) You have diabetes OR (2) Family history of glaucoma OR (3)  aged 50 and older OR (4)  American aged 65 and older  Osteoporosis Screening: covered every 2 years if you meet one of the following conditions: (1) Have a vertebral abnormality; (2) On glucocorticoid therapy for more than 3 months; (3) Have primary hyperparathyroidism; (4) On osteoporosis medications and need to assess response to drug therapy.  HIV Screening: covered annually if you're between the age of 15-65. Also covered annually if you are younger than 15 and older than 65 with risk factors for HIV infection. For pregnant patients, it is covered up to 3 times per pregnancy.    Immunizations:  Immunization Recommendations   Influenza Vaccine Annual influenza vaccination during flu season is recommended for all persons aged >= 6 months who do not have contraindications   Pneumococcal Vaccine   * Pneumococcal conjugate vaccine = PCV13 (Prevnar 13), PCV15 (Vaxneuvance), PCV20 (Prevnar 20)  * Pneumococcal polysaccharide vaccine = PPSV23  (Pneumovax) Adults 19-65 yo with certain risk factors or if 65+ yo  If never received any pneumonia vaccine: recommend Prevnar 20 (PCV20)  Give PCV20 if previously received 1 dose of PCV13 or PPSV23   Hepatitis B Vaccine 3 dose series if at intermediate or high risk (ex: diabetes, end stage renal disease, liver disease)   Respiratory syncytial virus (RSV) Vaccine - COVERED BY MEDICARE PART D  * RSVPreF3 (Arexvy) CDC recommends that adults 60 years of age and older may receive a single dose of RSV vaccine using shared clinical decision-making (SCDM)   Tetanus (Td) Vaccine - COST NOT COVERED BY MEDICARE PART B Following completion of primary series, a booster dose should be given every 10 years to maintain immunity against tetanus. Td may also be given as tetanus wound prophylaxis.   Tdap Vaccine - COST NOT COVERED BY MEDICARE PART B Recommended at least once for all adults. For pregnant patients, recommended with each pregnancy.   Shingles Vaccine (Shingrix) - COST NOT COVERED BY MEDICARE PART B  2 shot series recommended in those 19 years and older who have or will have weakened immune systems or those 50 years and older     Health Maintenance Due:      Topic Date Due   • Hepatitis C Screening  Never done   • Colorectal Cancer Screening  02/09/2025 (Originally 12/26/1995)     Immunizations Due:      Topic Date Due   • Influenza Vaccine (1) 09/01/2024     Advance Directives   What are advance directives?  Advance directives are legal documents that state your wishes and plans for medical care. These plans are made ahead of time in case you lose your ability to make decisions for yourself. Advance directives can apply to any medical decision, such as the treatments you want, and if you want to donate organs.   What are the types of advance directives?  There are many types of advance directives, and each state has rules about how to use them. You may choose a combination of any of the following:  Living will:  This is  a written record of the treatment you want. You can also choose which treatments you do not want, which to limit, and which to stop at a certain time. This includes surgery, medicine, IV fluid, and tube feedings.   Durable power of  for healthcare (DPAHC):  This is a written record that states who you want to make healthcare choices for you when you are unable to make them for yourself. This person, called a proxy, is usually a family member or a friend. You may choose more than 1 proxy.  Do not resuscitate (DNR) order:  A DNR order is used in case your heart stops beating or you stop breathing. It is a request not to have certain forms of treatment, such as CPR. A DNR order may be included in other types of advance directives.  Medical directive:  This covers the care that you want if you are in a coma, near death, or unable to make decisions for yourself. You can list the treatments you want for each condition. Treatment may include pain medicine, surgery, blood transfusions, dialysis, IV or tube feedings, and a ventilator (breathing machine).  Values history:  This document has questions about your views, beliefs, and how you feel and think about life. This information can help others choose the care that you would choose.  Why are advance directives important?  An advance directive helps you control your care. Although spoken wishes may be used, it is better to have your wishes written down. Spoken wishes can be misunderstood, or not followed. Treatments may be given even if you do not want them. An advance directive may make it easier for your family to make difficult choices about your care.   Weight Management   Why it is important to manage your weight:  Being overweight increases your risk of health conditions such as heart disease, high blood pressure, type 2 diabetes, and certain types of cancer. It can also increase your risk for osteoarthritis, sleep apnea, and other respiratory problems. Aim  for a slow, steady weight loss. Even a small amount of weight loss can lower your risk of health problems.  How to lose weight safely:  A safe and healthy way to lose weight is to eat fewer calories and get regular exercise. You can lose up about 1 pound a week by decreasing the number of calories you eat by 500 calories each day.   Healthy meal plan for weight management:  A healthy meal plan includes a variety of foods, contains fewer calories, and helps you stay healthy. A healthy meal plan includes the following:  Eat whole-grain foods more often.  A healthy meal plan should contain fiber. Fiber is the part of grains, fruits, and vegetables that is not broken down by your body. Whole-grain foods are healthy and provide extra fiber in your diet. Some examples of whole-grain foods are whole-wheat breads and pastas, oatmeal, brown rice, and bulgur.  Eat a variety of vegetables every day.  Include dark, leafy greens such as spinach, kale, rolando greens, and mustard greens. Eat yellow and orange vegetables such as carrots, sweet potatoes, and winter squash.   Eat a variety of fruits every day.  Choose fresh or canned fruit (canned in its own juice or light syrup) instead of juice. Fruit juice has very little or no fiber.  Eat low-fat dairy foods.  Drink fat-free (skim) milk or 1% milk. Eat fat-free yogurt and low-fat cottage cheese. Try low-fat cheeses such as mozzarella and other reduced-fat cheeses.  Choose meat and other protein foods that are low in fat.  Choose beans or other legumes such as split peas or lentils. Choose fish, skinless poultry (chicken or turkey), or lean cuts of red meat (beef or pork). Before you cook meat or poultry, cut off any visible fat.   Use less fat and oil.  Try baking foods instead of frying them. Add less fat, such as margarine, sour cream, regular salad dressing and mayonnaise to foods. Eat fewer high-fat foods. Some examples of high-fat foods include french fries, doughnuts, ice  cream, and cakes.  Eat fewer sweets.  Limit foods and drinks that are high in sugar. This includes candy, cookies, regular soda, and sweetened drinks.  Exercise:  Exercise at least 30 minutes per day on most days of the week. Some examples of exercise include walking, biking, dancing, and swimming. You can also fit in more physical activity by taking the stairs instead of the elevator or parking farther away from stores. Ask your healthcare provider about the best exercise plan for you.   Narcotic (Opioid) Safety    Use narcotics safely:  Take prescribed narcotics exactly as directed  Do not give narcotics to others or take narcotics that belong to someone else  Do not mix narcotics without medicines or alcohol  Do not drive or operate heavy machinery after you take the narcotic  Monitor for side effects and notify your healthcare provider if you experienced side effects such as nausea, sleepiness, itching, or trouble thinking clearly.    Manage constipation:    Constipation is the most common side effect of narcotic medicine. Constipation is when you have hard, dry bowel movements, or you go longer than usual between bowel movements. Tell your healthcare provider about all changes in your bowel movements while you are taking narcotics. He or she may recommend laxative medicine to help you have a bowel movement. He or she may also change the kind of narcotic you are taking, or change when you take it. The following are more ways you can prevent or relieve constipation:    Drink liquids as directed.  You may need to drink extra liquids to help soften and move your bowels. Ask how much liquid to drink each day and which liquids are best for you.  Eat high-fiber foods.  This may help decrease constipation by adding bulk to your bowel movements. High-fiber foods include fruits, vegetables, whole-grain breads and cereals, and beans. Your healthcare provider or dietitian can help you create a high-fiber meal plan. Your  provider may also recommend a fiber supplement if you cannot get enough fiber from food.  Exercise regularly.  Regular physical activity can help stimulate your intestines. Walking is a good exercise to prevent or relieve constipation. Ask which exercises are best for you.  Schedule a time each day to have a bowel movement.  This may help train your body to have regular bowel movements. Bend forward while you are on the toilet to help move the bowel movement out. Sit on the toilet for at least 10 minutes, even if you do not have a bowel movement.    Store narcotics safely:   Store narcotics where others cannot easily get them.  Keep them in a locked cabinet or secure area. Do not  keep them in a purse or other bag you carry with you. A person may be looking for something else and find the narcotics.  Make sure narcotics are stored out of the reach of children.  A child can easily overdose on narcotics. Narcotics may look like candy to a small child.    The best way to dispose of narcotics:      The laws vary by country and area. In the [place], the best way is to return the narcotics through a take-back program. This program is offered by the [place] Drug Enforcement Agency ([employer]UCKY). The following are options for using the program:  Take the narcotics to a [employer] collection site.  The site is often a law enforcement center. Call your local law enforcement center for scheduled take-back days in your area. You will be given information on where to go if the collection site is in a different location.  Take the narcotics to an approved pharmacy or hospital.  A pharmacy or hospital may be set up as a collection site. You will need to ask if it is a [employer] collection site if you were not directed there. A pharmacy or doctor's office may not be able to take back narcotics unless it is a [employer] site.  Use a mail-back system.  This means you are given containers to put the narcotics into. You will then mail them in the  containers.  Use a take-back drop box.  This is a place to leave the narcotics at any time. People and animals will not be able to get into the box. Your local law enforcement agency can tell you where to find a drop box in your area.    Other ways to manage pain:   Ask your healthcare provider about non-narcotic medicines to control pain.  Nonprescription medicines include NSAIDs (such as ibuprofen) and acetaminophen. Prescription medicines include muscle relaxers, antidepressants, and steroids.  Pain may be managed without any medicines.  Some ways to relieve pain include massage, aromatherapy, or meditation. Physical or occupational therapy may also help.    For more information:   Drug Enforcement Administration  [address] [place] [address]  Phone: 8- 284 - 736-7564  Web Address: https://www.deadiversion.[facility].gov/drug_disposal/     Food and Drug Administration  [address] [place] [address]  Phone: 3- 669 - 018-5366  Web Address: http://www.fda.gov     © Copyright [employer] 2018 Information is for End User's use only and may not be sold, redistributed or otherwise used for commercial purposes. All illustrations and images included in CareNotes® are the copyrighted property of [employer].D.A.M., Inc. or [employer]

## 2024-08-30 ENCOUNTER — HOSPITAL ENCOUNTER (OUTPATIENT)
Dept: CT IMAGING | Facility: HOSPITAL | Age: 74
Discharge: HOME | End: 2024-08-30
Attending: INTERNAL MEDICINE
Payer: COMMERCIAL

## 2024-08-30 ENCOUNTER — APPOINTMENT (OUTPATIENT)
Dept: LAB | Facility: HOSPITAL | Age: 74
End: 2024-08-30
Payer: COMMERCIAL

## 2024-08-30 ENCOUNTER — TELEPHONE (OUTPATIENT)
Dept: FAMILY MEDICINE CLINIC | Facility: HOSPITAL | Age: 74
End: 2024-08-30

## 2024-08-30 DIAGNOSIS — N41.0 ACUTE PROSTATITIS: ICD-10-CM

## 2024-08-30 DIAGNOSIS — Z12.5 ENCOUNTER FOR SCREENING FOR MALIGNANT NEOPLASM OF PROSTATE: ICD-10-CM

## 2024-08-30 LAB
ALBUMIN SERPL BCG-MCNC: 4.4 G/DL (ref 3.5–5)
ALP SERPL-CCNC: 64 U/L (ref 34–104)
ALT SERPL W P-5'-P-CCNC: 15 U/L (ref 7–52)
ANION GAP SERPL CALCULATED.3IONS-SCNC: 8 MMOL/L (ref 4–13)
AST SERPL W P-5'-P-CCNC: 15 U/L (ref 13–39)
BASOPHILS # BLD AUTO: 0.11 THOUSANDS/ÂΜL (ref 0–0.1)
BASOPHILS NFR BLD AUTO: 2 % (ref 0–1)
BILIRUB SERPL-MCNC: 0.47 MG/DL (ref 0.2–1)
BUN SERPL-MCNC: 33 MG/DL (ref 5–25)
CALCIUM SERPL-MCNC: 9.2 MG/DL (ref 8.4–10.2)
CHLORIDE SERPL-SCNC: 103 MMOL/L (ref 96–108)
CO2 SERPL-SCNC: 26 MMOL/L (ref 21–32)
CREAT SERPL-MCNC: 1.77 MG/DL (ref 0.6–1.3)
EOSINOPHIL # BLD AUTO: 0.26 THOUSAND/ÂΜL (ref 0–0.61)
EOSINOPHIL NFR BLD AUTO: 4 % (ref 0–6)
ERYTHROCYTE [DISTWIDTH] IN BLOOD BY AUTOMATED COUNT: 13.2 % (ref 11.6–15.1)
GFR SERPL CREATININE-BSD FRML MDRD: 37 ML/MIN/1.73SQ M
GLUCOSE SERPL-MCNC: 158 MG/DL (ref 65–140)
HCT VFR BLD AUTO: 40.4 % (ref 36.5–49.3)
HGB BLD-MCNC: 14 G/DL (ref 12–17)
IMM GRANULOCYTES # BLD AUTO: 0.05 THOUSAND/UL (ref 0–0.2)
IMM GRANULOCYTES NFR BLD AUTO: 1 % (ref 0–2)
LYMPHOCYTES # BLD AUTO: 2.05 THOUSANDS/ÂΜL (ref 0.6–4.47)
LYMPHOCYTES NFR BLD AUTO: 31 % (ref 14–44)
MCH RBC QN AUTO: 32.7 PG (ref 26.8–34.3)
MCHC RBC AUTO-ENTMCNC: 34.7 G/DL (ref 31.4–37.4)
MCV RBC AUTO: 94 FL (ref 82–98)
MONOCYTES # BLD AUTO: 0.51 THOUSAND/ÂΜL (ref 0.17–1.22)
MONOCYTES NFR BLD AUTO: 8 % (ref 4–12)
NEUTROPHILS # BLD AUTO: 3.6 THOUSANDS/ÂΜL (ref 1.85–7.62)
NEUTS SEG NFR BLD AUTO: 54 % (ref 43–75)
NRBC BLD AUTO-RTO: 0 /100 WBCS
PLATELET # BLD AUTO: 259 THOUSANDS/UL (ref 149–390)
PMV BLD AUTO: 9.2 FL (ref 8.9–12.7)
POTASSIUM SERPL-SCNC: 4.7 MMOL/L (ref 3.5–5.3)
PROT SERPL-MCNC: 7.1 G/DL (ref 6.4–8.4)
PSA SERPL-MCNC: 0.8 NG/ML (ref 0–4)
RBC # BLD AUTO: 4.28 MILLION/UL (ref 3.88–5.62)
SODIUM SERPL-SCNC: 137 MMOL/L (ref 135–147)
WBC # BLD AUTO: 6.58 THOUSAND/UL (ref 4.31–10.16)

## 2024-08-30 PROCEDURE — G0103 PSA SCREENING: HCPCS

## 2024-08-30 PROCEDURE — 87040 BLOOD CULTURE FOR BACTERIA: CPT

## 2024-08-30 PROCEDURE — 85025 COMPLETE CBC W/AUTO DIFF WBC: CPT

## 2024-08-30 PROCEDURE — 36415 COLL VENOUS BLD VENIPUNCTURE: CPT

## 2024-08-30 PROCEDURE — 80053 COMPREHEN METABOLIC PANEL: CPT

## 2024-08-30 PROCEDURE — 74176 CT ABD & PELVIS W/O CONTRAST: CPT

## 2024-08-30 NOTE — TELEPHONE ENCOUNTER
----- Message from Milton Hollingsworth MD sent at 8/30/2024  2:23 PM EDT -----  Please call patient and tell him that labs revealed stable kidney function, normal electrolytes, normal liver function tests, completely normal blood counts

## 2024-08-30 NOTE — TELEPHONE ENCOUNTER
----- Message from Milton Hollingsworth MD sent at 8/30/2024  2:22 PM EDT -----  Please call patient and tell him that CAT scan of the abdomen showed enlarged prostate, no kidney stones, no obstruction in his urinary system

## 2024-08-30 NOTE — TELEPHONE ENCOUNTER
Patient called [facility] stating he had a missed call. Advised patient to contact PCP as they've been trying to reach him to go over CT results.     Please call patient to further discuss.     Offered to transfer call patient declined.

## 2024-09-01 LAB — BACTERIA BLD CULT: NORMAL

## 2024-09-04 LAB — BACTERIA BLD CULT: NORMAL

## 2024-09-06 ENCOUNTER — TELEPHONE (OUTPATIENT)
Age: 74
End: 2024-09-06

## 2024-09-06 NOTE — TELEPHONE ENCOUNTER
New Patient    What is the reason for the patient’s appointment?: NP- ref for Acute prostatitis, Family history of prostate cancer in father     Patient is being treated for prostatitis by interest started on 8/29/24.     Patient does also have a history of kidney stones     What office location does the patient prefer?:    Does patient have Imaging/Lab Results:    CT done on 8/30/24  No recent urine testing     Have patient records been requested?:  If No, are the records showing in Epic: Records in epic     HISTORY:   Has the patient had any previous Urologist(s)?: Yes in the past but unsure of name    Scheduled: 10/18/24 with  in our [place] office.     Please advise on if time frame is okay. Patient finishes antibiotics for prostatitis in three days.

## 2024-09-12 DIAGNOSIS — I25.10 CORONARY ARTERY DISEASE INVOLVING NATIVE CORONARY ARTERY OF NATIVE HEART WITHOUT ANGINA PECTORIS: Primary | ICD-10-CM

## 2024-09-12 DIAGNOSIS — Z00.00 HEALTH CARE MAINTENANCE: ICD-10-CM

## 2024-09-12 DIAGNOSIS — E87.6 HYPOKALEMIA: ICD-10-CM

## 2024-09-12 RX ORDER — POTASSIUM CHLORIDE 1500 MG/1
20 TABLET, EXTENDED RELEASE ORAL DAILY
Qty: 90 TABLET | Refills: 3 | Status: SHIPPED | OUTPATIENT
Start: 2024-09-12 | End: 2024-09-12 | Stop reason: SDUPTHER

## 2024-09-12 RX ORDER — POTASSIUM CHLORIDE 1500 MG/1
20 TABLET, EXTENDED RELEASE ORAL DAILY
Qty: 90 TABLET | Refills: 3 | Status: SHIPPED | OUTPATIENT
Start: 2024-09-12

## 2024-09-21 DIAGNOSIS — Z00.6 ENCOUNTER FOR EXAMINATION FOR NORMAL COMPARISON OR CONTROL IN CLINICAL RESEARCH PROGRAM: ICD-10-CM

## 2024-09-24 ENCOUNTER — OFFICE VISIT (OUTPATIENT)
Dept: ENDOCRINOLOGY | Facility: HOSPITAL | Age: 74
End: 2024-09-24
Payer: COMMERCIAL

## 2024-09-24 VITALS
HEART RATE: 63 BPM | BODY MASS INDEX: 28.49 KG/M2 | SYSTOLIC BLOOD PRESSURE: 140 MMHG | DIASTOLIC BLOOD PRESSURE: 90 MMHG | WEIGHT: 199 LBS | HEIGHT: 70 IN

## 2024-09-24 DIAGNOSIS — R80.9 MICROALBUMINURIA: ICD-10-CM

## 2024-09-24 DIAGNOSIS — I10 PRIMARY HYPERTENSION: ICD-10-CM

## 2024-09-24 DIAGNOSIS — N18.30 STAGE 3 CHRONIC KIDNEY DISEASE, UNSPECIFIED WHETHER STAGE 3A OR 3B CKD (HCC): ICD-10-CM

## 2024-09-24 DIAGNOSIS — Z79.4 TYPE 2 DIABETES MELLITUS WITH HYPERGLYCEMIA, WITH LONG-TERM CURRENT USE OF INSULIN (HCC): Primary | ICD-10-CM

## 2024-09-24 DIAGNOSIS — E11.42 DIABETIC POLYNEUROPATHY ASSOCIATED WITH TYPE 2 DIABETES MELLITUS (HCC): ICD-10-CM

## 2024-09-24 DIAGNOSIS — E11.65 TYPE 2 DIABETES MELLITUS WITH HYPERGLYCEMIA, WITH LONG-TERM CURRENT USE OF INSULIN (HCC): Primary | ICD-10-CM

## 2024-09-24 DIAGNOSIS — E78.2 HYPERCHOLESTEROLEMIA WITH HYPERTRIGLYCERIDEMIA: ICD-10-CM

## 2024-09-24 LAB
ALBUMIN SERPL-MCNC: 4.1 G/DL (ref 3.8–4.8)
ALBUMIN/CREAT UR: 455 MG/G CREAT (ref 0–29)
ALP SERPL-CCNC: 84 IU/L (ref 44–121)
ALT SERPL-CCNC: 17 IU/L (ref 0–44)
AST SERPL-CCNC: 22 IU/L (ref 0–40)
BASOPHILS # BLD AUTO: 0.1 X10E3/UL (ref 0–0.2)
BASOPHILS NFR BLD AUTO: 2 %
BILIRUB SERPL-MCNC: 0.4 MG/DL (ref 0–1.2)
BUN SERPL-MCNC: 28 MG/DL (ref 8–27)
BUN/CREAT SERPL: 16 (ref 10–24)
CALCIUM SERPL-MCNC: 9.2 MG/DL (ref 8.6–10.2)
CHLORIDE SERPL-SCNC: 102 MMOL/L (ref 96–106)
CHOLEST SERPL-MCNC: 253 MG/DL (ref 100–199)
CO2 SERPL-SCNC: 21 MMOL/L (ref 20–29)
CREAT SERPL-MCNC: 1.75 MG/DL (ref 0.76–1.27)
CREAT UR-MCNC: 54.5 MG/DL
EGFR: 41 ML/MIN/1.73
EOSINOPHIL # BLD AUTO: 0.3 X10E3/UL (ref 0–0.4)
EOSINOPHIL NFR BLD AUTO: 4 %
ERYTHROCYTE [DISTWIDTH] IN BLOOD BY AUTOMATED COUNT: 13 % (ref 11.6–15.4)
GLOBULIN SER-MCNC: 2.3 G/DL (ref 1.5–4.5)
GLUCOSE SERPL-MCNC: 171 MG/DL (ref 70–99)
HBA1C MFR BLD: 6.1 % (ref 4.8–5.6)
HCT VFR BLD AUTO: 38.8 % (ref 37.5–51)
HDLC SERPL-MCNC: 24 MG/DL
HGB BLD-MCNC: 13.2 G/DL (ref 13–17.7)
IMM GRANULOCYTES # BLD: 0 X10E3/UL (ref 0–0.1)
IMM GRANULOCYTES NFR BLD: 1 %
LDL CALC COMMENT: ABNORMAL
LDLC SERPL CALC-MCNC: ABNORMAL MG/DL (ref 0–99)
LYMPHOCYTES # BLD AUTO: 2.3 X10E3/UL (ref 0.7–3.1)
LYMPHOCYTES NFR BLD AUTO: 34 %
MCH RBC QN AUTO: 32.5 PG (ref 26.6–33)
MCHC RBC AUTO-ENTMCNC: 34 G/DL (ref 31.5–35.7)
MCV RBC AUTO: 96 FL (ref 79–97)
MICROALBUMIN UR-MCNC: 247.8 UG/ML
MONOCYTES # BLD AUTO: 0.5 X10E3/UL (ref 0.1–0.9)
MONOCYTES NFR BLD AUTO: 8 %
NEUTROPHILS # BLD AUTO: 3.6 X10E3/UL (ref 1.4–7)
NEUTROPHILS NFR BLD AUTO: 51 %
PLATELET # BLD AUTO: 257 X10E3/UL (ref 150–450)
POTASSIUM SERPL-SCNC: 4.6 MMOL/L (ref 3.5–5.2)
PROT SERPL-MCNC: 6.4 G/DL (ref 6–8.5)
RBC # BLD AUTO: 4.06 X10E6/UL (ref 4.14–5.8)
SL AMB POCT GLUCOSE BLD: 67
SL AMB VLDL CHOLESTEROL CALC: ABNORMAL MG/DL (ref 5–40)
SODIUM SERPL-SCNC: 140 MMOL/L (ref 134–144)
TRIGL SERPL-MCNC: 853 MG/DL (ref 0–149)
TSH SERPL DL<=0.005 MIU/L-ACNC: 1.82 UIU/ML (ref 0.45–4.5)
WBC # BLD AUTO: 6.9 X10E3/UL (ref 3.4–10.8)

## 2024-09-24 PROCEDURE — 99214 OFFICE O/P EST MOD 30 MIN: CPT | Performed by: NURSE PRACTITIONER

## 2024-09-24 PROCEDURE — 82948 REAGENT STRIP/BLOOD GLUCOSE: CPT | Performed by: NURSE PRACTITIONER

## 2024-09-24 PROCEDURE — 95251 CONT GLUC MNTR ANALYSIS I&R: CPT | Performed by: NURSE PRACTITIONER

## 2024-09-24 NOTE — PATIENT INSTRUCTIONS
Be mindful of diet.     Stay active and stay hydrated.     Increase Lantus to 33 units.     Continue Novolog at current dose at meals and sliding scale for correction:     200-250 = 2 units  251-300 = 4 units  301-350 = 6 units  351-400 = 8 units  401-450 = 10 units     Continue DEXCOM G7.     DO NOT GIVE NOVOLOG AT BEDTIME AS IT CAUSES HYPOGLYCEMIA OVERNIGHT AND IT IS DANGEROUS !!!!!     Send a report to the office in 2 weeks for review.     Call with any concerns or questions.

## 2024-09-24 NOTE — PROGRESS NOTES
Fritz Colón 73 y.o. male MRN: [id number]    Encounter: [id number]      Assessment & Plan     Assessment:  This is a 73 y.o.-year-old male with type 2 diabetes with neuropathy, hypertension and hyperlipidemia.      Plan:  1. Type 2 diabetes, insulin requiring: Most recent hemoglobin A1c is 6.1. Review of his Dexcom continuous glucose monitor reveals hyperglycemia in the morning.  For this I have increased his Lantus to 33 units.  He did have a low in the office today at 67 with no symptoms.  He was treated with fast acting carbohydrates and left the office with a blood sugar of 90.  He was heading to get breakfast at that time.  Continue to utilize Dexcom to monitor glucose levels come to the office in 2 weeks for a download. Contact the office with any concerns or questions.  Check hemoglobin A1c prior to next visit.     2. Diabetic neuropathy:  Diabetic foot exam performed at previous office visit.     3. Hypertension:  Continue current regimen.  Check comprehensive metabolic panel prior to next visit.     4. Hyperlipidemia: Triglycerides were 831. For now, continue current regimen, contact cardiologist and consider follow up with lipid clinic.  Check fasting lipid panel prior to next visit.    CC: Type 2 Diabetes Follow Up    History of Present Illness     HPI:  73 year old male with type 2 diabetes, insulin requiring for 20 years with hypertension and hyperlipidemia of hypertriglyceridemia for follow up.  He was diagnosed with diabetes about 20 years ago. He has started Omnipod therapy since his last visit and discontinued it shortly thereafter.  Most recent hemoglobin A1c from September 23, 2024 is 6.1.  He is status post right hip replacement from November 8, 2022.  He denies any polyuria, polydipsia, polyphagia, and blurry vision.  He has once or twice a night nocturia.  He has a bit of numbness of the feet and will feel unsteady at times and has fallen multiple times.  He denies chest pain or shortness of  breath.  He denies nephropathy, retinopathy, heart attack, stroke and claudication but does admit to neuropathy, coronary artery disease and TIAs. Initial concern was a TIA, but was diagnosed with Bell palsy. He is S/P right sided total hip replacement from November 2022.          Hypoglycemic episodes: After dinner at times recently.   H/o of hypoglycemia causing hospitalization or intervention such as glucagon injection  or ambulance call  No.  Hypoglycemia symptoms: jitteriness, sweating and lightheaded.  Treatment of hypoglycemia: glucose packets.  Glucagon:No.  Medic alert tag: recommended,Yes.      The patient's last eye exam was in November 2021 with no retinopathy.  The patient's last foot exam was performed at his office visit on June 14, 2024.      Blood Sugar/Glucometer/Pump/CGM review:  Download of Dexcom from September 11 through September 24, 2024 reveals an average glucose level of 155 with the standard deviation of 50.  He is in target range 66% time, below target range <2% time, above target range 33% time.     He has hypertension and takes losartan 50 mg daily and Bystolic 20 mg daily.  He denies headache or stroke-like symptoms.     He has hyperlipidemia with significant hypertriglyceridemia as high as 2400 and takes simvastatin 20 mg daily, Zetia 10 mg daily, Tricor 54 mg and Vascepa 2 g - 2 times a day.  He denies chest pain or shortness of breath.     Review of Systems   Constitutional: Negative.  Negative for chills, fatigue and fever.   HENT: Negative.  Negative for trouble swallowing and voice change.    Eyes:  Negative for photophobia, pain, discharge, redness, itching and visual disturbance.   Respiratory:  Negative for cough and shortness of breath.    Cardiovascular:  Negative for chest pain and palpitations.   Gastrointestinal:  Negative for abdominal pain, constipation, diarrhea, nausea and vomiting.   Endocrine: Negative for cold intolerance, heat intolerance, polydipsia, polyphagia  and polyuria.   Genitourinary: Negative.    Musculoskeletal: Negative.    Skin: Negative.    Allergic/Immunologic: Negative.    Neurological:  Negative for dizziness, syncope, light-headedness and headaches.   Hematological: Negative.    Psychiatric/Behavioral: Negative.     All other systems reviewed and are negative.      Historical Information   Past Medical History:   Diagnosis Date    Arthritis     Cervical disc disease     C2-7, needs repair    Coronary artery disease     angina is pain in throat    [facility] infection     in 40s with liver affects    Hypertension     Kidney stone      Past Surgical History:   Procedure Laterality Date    ANKLE ARTHROSCOPY Bilateral     APPENDECTOMY      CATARACT EXTRACTION, BILATERAL Bilateral     CHOLECYSTECTOMY      CORONARY ANGIOPLASTY WITH STENT PLACEMENT      2 stents in LAD    ELBOW ARTHROSCOPY Bilateral     with right elbow tear repaired    ERCP      KNEE ARTHROSCOPY Right     LITHOTRIPSY      LUMBAR LAMINECTOMY      5 times    MULTIPLE TOOTH EXTRACTIONS      NOSE SURGERY  2021    fracture post a fall    SC ARTHRP ACETBLR/PROX FEM PROSTC AGRFT/ALGRFT Right 11/09/2022    Procedure: ARTHROPLASTY HIP TOTAL ANTERIOR,NAVIGATED- SAME DAY;  Surgeon: Servando Levi DO;  Location:  MAIN OR;  Service: Orthopedics    REPLACEMENT TOTAL KNEE Left     SINUS SURGERY      THUMB FUSION Bilateral     thumb repair with bone grafts    TONSILLECTOMY AND ADENOIDECTOMY       Social History   Social History     Substance and Sexual Activity   Alcohol Use Yes    Comment: Maybe 1 drink every 3-4 weeks     Social History     Substance and Sexual Activity   Drug Use Never     Social History     Tobacco Use   Smoking Status Former    Current packs/day: 1.50    Average packs/day: 1.5 packs/day for 25.0 years (37.5 ttl pk-yrs)    Types: Cigarettes   Smokeless Tobacco Never   Tobacco Comments    Quit at age 27     Family History:   Family History   Problem Relation Age of Onset    Uterine  cancer Mother     Colon cancer Father     Cancer Sister         corneal    No Known Problems Brother        Meds/Allergies   Current Outpatient Medications   Medication Sig Dispense Refill    allopurinol (ZYLOPRIM) 300 mg tablet Take 300 mg by mouth daily      amitriptyline (ELAVIL) 50 mg tablet Take 1 tablet (50 mg total) by mouth daily at bedtime 90 tablet 1    aspirin (ECOTRIN LOW STRENGTH) 81 mg EC tablet Take 1 tablet (81 mg total) by mouth 2 (two) times a day 60 tablet 0    atorvastatin (LIPITOR) 40 mg tablet Take 1 tablet (40 mg total) by mouth daily 90 tablet 1    ezetimibe (ZETIA) 10 mg tablet Take 10 mg by mouth daily      fenofibrate (TRICOR) 54 MG tablet Take 1 tablet (54 mg total) by mouth daily 30 tablet 6    gabapentin (NEURONTIN) 100 mg capsule Take 100 mg by mouth daily at bedtime      HYDROcodone-acetaminophen (NORCO) 5-325 mg per tablet       Icosapent Ethyl (Vascepa) 1 g CAPS Take 2 capsules (2 g total) by mouth 2 (two) times a day 120 capsule 0    insulin aspart (NovoLOG FlexPen) 100 UNIT/ML injection pen INJECT 15 UNITS SUBCUTANEOUSLY WITH BREAKFAST AND LUNCH WITH 13 UNITS WITH DINNER PLUS SLIDING SCALE USE UP TO 65 UNITS DAILY 60 mL 1    Klor-Con M20 20 MEQ tablet Take 1 tablet (20 mEq total) by mouth daily 90 tablet 3    Lantus SoloStar 100 units/mL SOPN Inject 0.3 mL (30 Units total) under the skin daily at bedtime 9 mL 5    Magnesium 400 MG TABS Take by mouth daily        Multiple Vitamins-Minerals (Multivitamin Men 50+) TABS Take by mouth daily      NIFEdipine (PROCARDIA XL) 60 mg 24 hr tablet Take 1 tablet (60 mg total) by mouth daily 90 tablet 1    NovoLOG 100 UNIT/ML injection Up to 80 units per day via pump (Patient not taking: Reported on 2/9/2024) 10 mL 3     No current facility-administered medications for this visit.     Allergies   Allergen Reactions    Mushroom Extract Complex - Food Allergy Anaphylaxis    Nitroglycerin Vomiting and Headache       Objective   Vitals: Blood  "pressure 140/90, pulse 63, height 5' 10\" (1.778 m), weight 90.3 kg (199 lb).    Physical Exam  Vitals reviewed.   Constitutional:       Appearance: He is well-developed.   HENT:      Head: Normocephalic and atraumatic.      Nose: Nose normal.   Eyes:      Conjunctiva/sclera: Conjunctivae normal.      Pupils: Pupils are equal, round, and reactive to light.      Comments: Wears glasses   Cardiovascular:      Rate and Rhythm: Normal rate and regular rhythm.      Heart sounds: Normal heart sounds.   Pulmonary:      Effort: Pulmonary effort is normal.      Breath sounds: Normal breath sounds.   Abdominal:      General: Bowel sounds are normal.      Palpations: Abdomen is soft.   Musculoskeletal:         General: Normal range of motion.      Cervical back: Normal range of motion and neck supple.   Skin:     General: Skin is warm and dry.   Neurological:      Mental Status: He is alert and oriented to person, place, and time.   Psychiatric:         Behavior: Behavior normal.         Thought Content: Thought content normal.         Judgment: Judgment normal.       Lab Results:   Lab Results   Component Value Date/Time    Hemoglobin A1C 6.2 (H) 06/11/2024 09:52 AM    Hemoglobin A1C 6.2 (H) 04/16/2024 07:48 AM    Hemoglobin A1C 6.6 (H) 11/29/2023 10:13 AM    WBC 6.58 08/30/2024 09:34 AM    White Blood Cell Count 5.7 06/11/2024 09:52 AM    White Blood Cell Count 6.0 04/16/2024 07:48 AM    White Blood Cell Count 8.2 11/29/2023 10:13 AM    Hemoglobin 14.0 08/30/2024 09:34 AM    Hemoglobin 13.3 06/11/2024 09:52 AM    Hemoglobin 12.5 (L) 04/16/2024 07:48 AM    Hemoglobin 13.1 11/29/2023 10:13 AM    Hematocrit 40.4 08/30/2024 09:34 AM    HCT 39.3 06/11/2024 09:52 AM    HCT 37.5 04/16/2024 07:48 AM    HCT 37.5 11/29/2023 10:13 AM    MCV 94 08/30/2024 09:34 AM    MCV 95 06/11/2024 09:52 AM    MCV 97 04/16/2024 07:48 AM    MCV 93 11/29/2023 10:13 AM    Platelet Count 245 06/11/2024 09:52 AM    Platelet Count 267 04/16/2024 07:48 AM    " "Platelet Count 266 11/29/2023 10:13 AM    Platelets 259 08/30/2024 09:34 AM    BUN 33 (H) 08/30/2024 09:34 AM    BUN 25 06/11/2024 09:52 AM    BUN 33 (H) 04/16/2024 07:48 AM    BUN 20 11/29/2023 10:13 AM    Potassium 4.7 08/30/2024 09:34 AM    Potassium 4.3 06/11/2024 09:52 AM    Potassium 4.9 04/16/2024 07:48 AM    Potassium 4.4 11/29/2023 10:13 AM    Chloride 103 08/30/2024 09:34 AM    Chloride 103 06/11/2024 09:52 AM    Chloride 106 04/16/2024 07:48 AM    Chloride 105 11/29/2023 10:13 AM    CO2 26 08/30/2024 09:34 AM    CO2 27 06/11/2024 09:52 AM    CO2 24 04/16/2024 07:48 AM    CO2 21 11/29/2023 10:13 AM    Creatinine 1.77 (H) 08/30/2024 09:34 AM    Creatinine 1.92 (H) 06/11/2024 09:52 AM    Creatinine 1.79 (H) 04/16/2024 07:48 AM    Creatinine 1.43 (H) 11/29/2023 10:13 AM    AST 15 08/30/2024 09:34 AM    AST 22 06/11/2024 09:52 AM    AST 24 04/16/2024 07:48 AM    AST 18 11/29/2023 10:13 AM    ALT 15 08/30/2024 09:34 AM    ALT 49 (H) 06/11/2024 09:52 AM    ALT 18 04/16/2024 07:48 AM    ALT 18 11/29/2023 10:13 AM    Total Protein 7.1 08/30/2024 09:34 AM    Protein, Total 6.7 06/11/2024 09:52 AM    Protein, Total 6.3 04/16/2024 07:48 AM    Protein, Total 6.7 11/29/2023 10:13 AM    Albumin 4.4 08/30/2024 09:34 AM    Albumin 4.4 06/11/2024 09:52 AM    Albumin 4.4 04/16/2024 07:48 AM    Albumin 4.6 11/29/2023 10:13 AM    Globulin, Total 2.3 06/11/2024 09:52 AM    Globulin, Total 1.9 04/16/2024 07:48 AM    Globulin, Total 2.1 11/29/2023 10:13 AM    HDL 31 (L) 04/16/2024 07:48 AM    HDL 31 (L) 11/29/2023 10:13 AM    Triglycerides 431 (H) 04/16/2024 07:48 AM    Triglycerides 925 (HH) 11/29/2023 10:13 AM       Portions of the record may have been created with voice recognition software. Occasional wrong word or \"sound a like\" substitutions may have occurred due to the inherent limitations of voice recognition software. Read the chart carefully and recognize, using context, where substitutions have occurred.    "

## 2024-09-26 ENCOUNTER — TELEPHONE (OUTPATIENT)
Dept: FAMILY MEDICINE CLINIC | Facility: HOSPITAL | Age: 74
End: 2024-09-26

## 2024-09-26 NOTE — TELEPHONE ENCOUNTER
----- Message from Lisa Brooks DO sent at 9/25/2024  7:20 PM EDT -----  Check if pt is taking both the lipitor and fenofibrate  ----- Message -----  From: [facility] Results In  Sent: 9/24/2024   2:06 PM EDT  To: Lisa Brooks DO

## 2024-09-26 NOTE — TELEPHONE ENCOUNTER
Left message for patient to call back to let us know if he is taking both Lipitor and Fenofibrate.

## 2024-09-27 NOTE — TELEPHONE ENCOUNTER
Pt returned call. Reports that he currently takes atorvastatin/lipitor, but not the fenofibrate. If a medication will be called in, please send to [facility]. Thank you

## 2024-09-30 DIAGNOSIS — E78.2 HYPERCHOLESTEROLEMIA WITH HYPERTRIGLYCERIDEMIA: ICD-10-CM

## 2024-09-30 RX ORDER — FENOFIBRATE 54 MG/1
54 TABLET ORAL DAILY
Qty: 30 TABLET | Refills: 6 | Status: SHIPPED | OUTPATIENT
Start: 2024-09-30

## 2024-09-30 NOTE — TELEPHONE ENCOUNTER
Reason for call:   [x] Refill   [] Prior Auth  [] Other:     Office:   [] PCP/Provider -   [x] Specialty/Provider - ENDOCRINOLOGY - GUERRERO Mancera     Medication:  Icosapent Ethyl (Vascepa) 1 g CAPS    Dose/Frequency: Take 2 capsules (2 g total) by mouth 2 (two) times a day     Quantity: 120 capsule     Pharmacy: [facility] [address] [phone]    Does the patient have enough for 3 days?   [] Yes   [x] No - Send as HP to POD

## 2024-10-01 ENCOUNTER — APPOINTMENT (OUTPATIENT)
Dept: LAB | Facility: HOSPITAL | Age: 74
End: 2024-10-01

## 2024-10-01 DIAGNOSIS — Z00.6 ENCOUNTER FOR EXAMINATION FOR NORMAL COMPARISON OR CONTROL IN CLINICAL RESEARCH PROGRAM: ICD-10-CM

## 2024-10-01 PROCEDURE — 36415 COLL VENOUS BLD VENIPUNCTURE: CPT

## 2024-10-01 RX ORDER — ICOSAPENT ETHYL 1 G/1
2 CAPSULE ORAL 2 TIMES DAILY
Qty: 120 CAPSULE | Refills: 0 | Status: SHIPPED | OUTPATIENT
Start: 2024-10-01

## 2024-10-14 ENCOUNTER — HOSPITAL ENCOUNTER (EMERGENCY)
Facility: HOSPITAL | Age: 74
Discharge: HOME | End: 2024-10-14
Payer: COMMERCIAL

## 2024-10-14 ENCOUNTER — APPOINTMENT (OUTPATIENT)
Dept: RADIOLOGY | Facility: HOSPITAL | Age: 74
End: 2024-10-14
Payer: COMMERCIAL

## 2024-10-14 VITALS
DIASTOLIC BLOOD PRESSURE: 108 MMHG | WEIGHT: 195 LBS | SYSTOLIC BLOOD PRESSURE: 165 MMHG | BODY MASS INDEX: 27.98 KG/M2 | OXYGEN SATURATION: 98 % | RESPIRATION RATE: 18 BRPM | HEART RATE: 104 BPM | TEMPERATURE: 98.8 F

## 2024-10-14 DIAGNOSIS — S43.402A SPRAIN OF LEFT SHOULDER JOINT: Primary | ICD-10-CM

## 2024-10-14 PROCEDURE — 99283 EMERGENCY DEPT VISIT LOW MDM: CPT

## 2024-10-14 PROCEDURE — 73030 X-RAY EXAM OF SHOULDER: CPT

## 2024-10-14 RX ORDER — ACETAMINOPHEN 325 MG/1
650 TABLET ORAL ONCE
Status: COMPLETED | OUTPATIENT
Start: 2024-10-14 | End: 2024-10-14

## 2024-10-14 RX ADMIN — ACETAMINOPHEN 650 MG: 325 TABLET ORAL at 15:49

## 2024-10-14 RX ADMIN — DICLOFENAC SODIUM 2 G: 10 GEL TOPICAL at 15:50

## 2024-10-14 NOTE — ED PROVIDER NOTES
Time reflects when diagnosis was documented in both MDM as applicable and the Disposition within this note       Time User Action Codes Description Comment    10/14/2024  4:26 PM Keaton Lo Add [S43.402A] Sprain of left shoulder joint           ED Disposition       ED Disposition   Discharge    Condition   Stable    Date/Time   Mon Oct 14, 2024  4:28 PM    Comment   F[address] [facility] discharge to home/self care.                   Assessment & Plan       Medical Decision Making      DDx: Shoulder fracture, dislocation, ligamentous injury, rotator cuff injury    Plan: Left shoulder x-ray, pain control, splint vs sling    See ED course for further discussion    X-ray was formally read by radiology.  No concern for acute osseous injury.  Patient will be placed in left shoulder sling for comfort.  Sling applied by nurse.  Patient provided with follow-up to orthopedic surgery for concern of ligament strain versus rotator cuff injury.  Counseled on how to perform rotator cuff stretches and exercises.  Pain was well-controlled.  All questions answered.  Stable for discharge.    Amount and/or Complexity of Data Reviewed  Radiology: ordered and independent interpretation performed. Decision-making details documented in ED Course.    Risk  OTC drugs.        ED Course as of 10/14/24 1647   Mon Oct 14, 2024   1514 Blood Pressure(!): 165/108   1514 Temperature: 98.8 °F (37.1 °C)   1514 Temp Source: Temporal   1514 Pulse: 104   1515 Respirations: 18   1515 SpO2: 98 %   1528 Phone call placed to reading room.  Pending official read of left shoulder.   1625 XR shoulder 2+ views LEFT  Official read     IMPRESSION:  No acute osseous abnormality.        Computerized Assisted Algorithm (CAA) may have been used to analyze all applicable images.        Workstation performed: [id number]         Medications   acetaminophen (TYLENOL) tablet 650 mg (650 mg Oral Given 10/14/24 1549)   Diclofenac Sodium (VOLTAREN) 1 % topical gel 2 g (2 g  Topical Given 10/14/24 1550)       ED Risk Strat Scores                           SBIRT 20yo+      Flowsheet Row Most Recent Value   Initial Alcohol Screen: US AUDIT-C     1. How often do you have a drink containing alcohol? 0 Filed at: 10/14/2024 1341   2. How many drinks containing alcohol do you have on a typical day you are drinking?  0 Filed at: 10/14/2024 1341   3a. Male UNDER 65: How often do you have five or more drinks on one occasion? 0 Filed at: 10/14/2024 1341   3b. FEMALE Any Age, or MALE 65+: How often do you have 4 or more drinks on one occassion? 0 Filed at: 10/14/2024 1341   Audit-C Score 0 Filed at: 10/14/2024 1341   BRENDA: How many times in the past year have you...    Used an illegal drug or used a prescription medication for non-medical reasons? Never Filed at: 10/14/2024 1341                            History of Present Illness       Chief Complaint   Patient presents with    Shoulder Pain     Pt to ED c/o L shoulder pain. States he can't use it anymore, hooked arm on door frame and has been having pain since.        Past Medical History:   Diagnosis Date    Arthritis     Cervical disc disease     C2-7, needs repair    Coronary artery disease     angina is pain in throat    Susanna Hernandez infection     in 40s with liver affects    Hypertension     Kidney stone       Past Surgical History:   Procedure Laterality Date    ANKLE ARTHROSCOPY Bilateral     APPENDECTOMY      CATARACT EXTRACTION, BILATERAL Bilateral     CHOLECYSTECTOMY      CORONARY ANGIOPLASTY WITH STENT PLACEMENT      2 stents in LAD    ELBOW ARTHROSCOPY Bilateral     with right elbow tear repaired    ERCP      KNEE ARTHROSCOPY Right     LITHOTRIPSY      LUMBAR LAMINECTOMY      5 times    MULTIPLE TOOTH EXTRACTIONS      NOSE SURGERY  2021    fracture post a fall    ND ARTHRP ACETBLR/PROX FEM PROSTC AGRFT/ALGRFT Right 11/09/2022    Procedure: ARTHROPLASTY HIP TOTAL ANTERIOR,NAVIGATED- SAME DAY;  Surgeon: Servando Levi DO;   Location:  MAIN OR;  Service: Orthopedics    REPLACEMENT TOTAL KNEE Left     SINUS SURGERY      THUMB FUSION Bilateral     thumb repair with bone grafts    TONSILLECTOMY AND ADENOIDECTOMY        Family History   Problem Relation Age of Onset    Uterine cancer Mother     Colon cancer Father     Cancer Sister         corneal    No Known Problems Brother       Social History     Tobacco Use    Smoking status: Former     Current packs/day: 1.50     Average packs/day: 1.5 packs/day for 25.0 years (37.5 ttl pk-yrs)     Types: Cigarettes    Smokeless tobacco: Never    Tobacco comments:     Quit at age 27   Vaping Use    Vaping status: Never Used   Substance Use Topics    Alcohol use: Yes     Comment: Maybe 1 drink every 3-4 weeks    Drug use: Never      E-Cigarette/Vaping    E-Cigarette Use Never User       E-Cigarette/Vaping Substances      I have reviewed and agree with the history as documented.     73-year-old male presents emergency department complaining of left shoulder pain.  States this injury occurred Friday or Saturday.  He got his arm stuck in a door and describes injury that resulted in external rotation of the left arm.  He felt severe pain in the left shoulder.  Currently complains of 5/10 pain at rest.  Pain is worse with movement she describes as a 9/10.  No chest pain or shortness of breath.  No falls.  Complains of pain that starts in the left shoulder and goes down to left elbow.  No radiation of pain down to her fingertips.   strength intact.  +2 radial pulses bilaterally.          Review of Systems   Musculoskeletal:         Left shoulder pain   All other systems reviewed and are negative.          Objective       ED Triage Vitals [10/14/24 1339]   Temperature Pulse Blood Pressure Respirations SpO2 Patient Position - Orthostatic VS   98.8 °F (37.1 °C) 104 (!) 165/108 18 98 % Sitting      Temp Source Heart Rate Source BP Location FiO2 (%) Pain Score    Temporal Monitor Right arm -- 3      Vitals       Date and Time Temp Pulse SpO2 Resp BP Pain Score FACES Pain Rating User   10/14/24 1339 98.8 °F (37.1 °C) 104 98 % 18 165/108 3 -- ML            Physical Exam  Vitals and nursing note reviewed.   Constitutional:       General: He is not in acute distress.     Appearance: He is well-developed.   HENT:      Head: Normocephalic and atraumatic.   Eyes:      Conjunctiva/sclera: Conjunctivae normal.   Cardiovascular:      Rate and Rhythm: Normal rate and regular rhythm.      Heart sounds: No murmur heard.  Pulmonary:      Effort: Pulmonary effort is normal. No respiratory distress.      Breath sounds: Normal breath sounds.   Abdominal:      Palpations: Abdomen is soft.      Tenderness: There is no abdominal tenderness.   Musculoskeletal:         General: No swelling.        Arms:       Cervical back: Neck supple.      Comments: +2 radial pulses bilaterally.  Elbow flexion and extension 5/5 bilaterally, wrist flexion and extension 5/5 bilaterally.  Hand strength intact.  Severe pain with elbow abduction, internal rotation, external rotation.   Skin:     General: Skin is warm and dry.      Capillary Refill: Capillary refill takes less than 2 seconds.   Neurological:      General: No focal deficit present.      Mental Status: He is alert and oriented to person, place, and time. Mental status is at baseline.      GCS: GCS eye subscore is 4. GCS verbal subscore is 5. GCS motor subscore is 6.      Cranial Nerves: No cranial nerve deficit.      Sensory: No sensory deficit.      Motor: No weakness.      Coordination: Coordination normal.      Gait: Gait normal.   Psychiatric:         Mood and Affect: Mood normal.         Results Reviewed       None            XR shoulder 2+ views LEFT   ED Interpretation by Keaton Lo DO (10/14 2258)   AI concern for fracture. Small fracture over L humeral head.       Final Interpretation by Berna Li MD (10/14 6849)   No acute osseous abnormality.         Computerized Assisted  Algorithm (CAA) may have been used to analyze all applicable images.         Workstation performed: [id number]             Procedures    ED Medication and Procedure Management   Prior to Admission Medications   Prescriptions Last Dose Informant Patient Reported? Taking?   HYDROcodone-acetaminophen (NORCO) 5-325 mg per tablet  Self Yes No   Icosapent Ethyl (Vascepa) 1 g CAPS   No No   Sig: Take 2 capsules (2 g total) by mouth 2 (two) times a day   Klor-Con M20 20 MEQ tablet   No No   Sig: Take 1 tablet (20 mEq total) by mouth daily   Lantus SoloStar 100 units/mL SOPN   No No   Sig: Inject 0.3 mL (30 Units total) under the skin daily at bedtime   Magnesium 400 MG TABS  Self Yes No   Sig: Take by mouth daily     Multiple Vitamins-Minerals (Multivitamin Men 50+) TABS  Self Yes No   Sig: Take by mouth daily   NIFEdipine (PROCARDIA XL) 60 mg 24 hr tablet   No No   Sig: Take 1 tablet (60 mg total) by mouth daily   NovoLOG 100 UNIT/ML injection  Self No No   Sig: Up to 80 units per day via pump   Patient not taking: Reported on 2/9/2024   allopurinol (ZYLOPRIM) 300 mg tablet  Self Yes No   Sig: Take 300 mg by mouth daily   amitriptyline (ELAVIL) 50 mg tablet  Self No No   Sig: Take 1 tablet (50 mg total) by mouth daily at bedtime   aspirin (ECOTRIN LOW STRENGTH) 81 mg EC tablet  Self No No   Sig: Take 1 tablet (81 mg total) by mouth 2 (two) times a day   atorvastatin (LIPITOR) 40 mg tablet   No No   Sig: Take 1 tablet (40 mg total) by mouth daily   ezetimibe (ZETIA) 10 mg tablet  Self Yes No   Sig: Take 10 mg by mouth daily   fenofibrate (TRICOR) 54 MG tablet   No No   Sig: Take 1 tablet (54 mg total) by mouth daily   gabapentin (NEURONTIN) 100 mg capsule   Yes No   Sig: Take 100 mg by mouth daily at bedtime   insulin aspart (NovoLOG FlexPen) 100 UNIT/ML injection pen   No No   Sig: INJECT 15 UNITS SUBCUTANEOUSLY WITH BREAKFAST AND LUNCH WITH 13 UNITS WITH DINNER PLUS SLIDING SCALE USE UP TO 65 UNITS DAILY       Facility-Administered Medications: None     Discharge Medication List as of 10/14/2024  4:30 PM        CONTINUE these medications which have NOT CHANGED    Details   allopurinol (ZYLOPRIM) 300 mg tablet Take 300 mg by mouth daily, Starting Tue 3/29/2022, Historical Med      amitriptyline (ELAVIL) 50 mg tablet Take 1 tablet (50 mg total) by mouth daily at bedtime, Starting Mon 10/2/2023, Normal      aspirin (ECOTRIN LOW STRENGTH) 81 mg EC tablet Take 1 tablet (81 mg total) by mouth 2 (two) times a day, Starting Wed 11/9/2022, Normal      atorvastatin (LIPITOR) 40 mg tablet Take 1 tablet (40 mg total) by mouth daily, Starting Mon 11/13/2023, Normal      ezetimibe (ZETIA) 10 mg tablet Take 10 mg by mouth daily, Historical Med      fenofibrate (TRICOR) 54 MG tablet Take 1 tablet (54 mg total) by mouth daily, Starting Mon 9/30/2024, Normal      gabapentin (NEURONTIN) 100 mg capsule Take 100 mg by mouth daily at bedtime, Historical Med      HYDROcodone-acetaminophen (NORCO) 5-325 mg per tablet Starting Fri 12/2/2022, Historical Med      Icosapent Ethyl (Vascepa) 1 g CAPS Take 2 capsules (2 g total) by mouth 2 (two) times a day, Starting Tue 10/1/2024, Normal      insulin aspart (NovoLOG FlexPen) 100 UNIT/ML injection pen INJECT 15 UNITS SUBCUTANEOUSLY WITH BREAKFAST AND LUNCH WITH 13 UNITS WITH DINNER PLUS SLIDING SCALE USE UP TO 65 UNITS DAILY, Normal      Klor-Con M20 20 MEQ tablet Take 1 tablet (20 mEq total) by mouth daily, Starting Thu 9/12/2024, Normal      Lantus SoloStar 100 units/mL SOPN Inject 0.3 mL (30 Units total) under the skin daily at bedtime, Starting Thu 5/9/2024, Until Tue 9/24/2024, Normal      Magnesium 400 MG TABS Take by mouth daily  , Historical Med      Multiple Vitamins-Minerals (Multivitamin Men 50+) TABS Take by mouth daily, Historical Med      NIFEdipine (PROCARDIA XL) 60 mg 24 hr tablet Take 1 tablet (60 mg total) by mouth daily, Starting Thu 6/6/2024, Normal      NovoLOG 100 UNIT/ML  injection Up to 80 units per day via pump, Normal             ED SEPSIS DOCUMENTATION   Time reflects when diagnosis was documented in both MDM as applicable and the Disposition within this note       Time User Action Codes Description Comment    10/14/2024  4:26 PM Keaton Lo Add [S43.402A] Sprain of left shoulder joint                  Keaton Lo, DO  10/14/24 7801

## 2024-10-14 NOTE — DISCHARGE INSTRUCTIONS
Fritz Colón was seen and evaluated today in the emergency department over your concern of left shoulder pain.  The workup that we performed showed left ligamentous shoulder injury, concern for rotator cuff, no fracture.  Please return to the emergency department if you experience pain, arm weakness, pins-and-needles or any other signs and symptoms that may be concerning to you.  Please follow-up with your primary care doctor within 1 week.  All questions were answered prior to discharge.  Thank you for choosing [facility] for your care.    Follow-up with orthopedist as soon as possible.

## 2024-10-15 LAB
APOB+LDLR+PCSK9 GENE MUT ANL BLD/T: NOT DETECTED
BRCA1+BRCA2 DEL+DUP + FULL MUT ANL BLD/T: NOT DETECTED
MLH1+MSH2+MSH6+PMS2 GN DEL+DUP+FUL M: NOT DETECTED

## 2024-10-22 VITALS
BODY MASS INDEX: 28.49 KG/M2 | DIASTOLIC BLOOD PRESSURE: 84 MMHG | WEIGHT: 199 LBS | HEIGHT: 70 IN | SYSTOLIC BLOOD PRESSURE: 142 MMHG

## 2024-10-22 DIAGNOSIS — M19.012 PRIMARY OSTEOARTHRITIS OF LEFT SHOULDER: Primary | ICD-10-CM

## 2024-10-22 DIAGNOSIS — M25.512 ACUTE PAIN OF LEFT SHOULDER: ICD-10-CM

## 2024-10-22 PROCEDURE — 99214 OFFICE O/P EST MOD 30 MIN: CPT | Performed by: ORTHOPAEDIC SURGERY

## 2024-10-22 PROCEDURE — 20610 DRAIN/INJ JOINT/BURSA W/O US: CPT | Performed by: ORTHOPAEDIC SURGERY

## 2024-10-22 RX ORDER — BETAMETHASONE SODIUM PHOSPHATE AND BETAMETHASONE ACETATE 3; 3 MG/ML; MG/ML
6 INJECTION, SUSPENSION INTRA-ARTICULAR; INTRALESIONAL; INTRAMUSCULAR; SOFT TISSUE
Status: COMPLETED | OUTPATIENT
Start: 2024-10-22 | End: 2024-10-22

## 2024-10-22 RX ORDER — LIDOCAINE HYDROCHLORIDE 10 MG/ML
5 INJECTION, SOLUTION EPIDURAL; INFILTRATION; INTRACAUDAL; PERINEURAL
Status: COMPLETED | OUTPATIENT
Start: 2024-10-22 | End: 2024-10-22

## 2024-10-22 RX ADMIN — BETAMETHASONE SODIUM PHOSPHATE AND BETAMETHASONE ACETATE 6 MG: 3; 3 INJECTION, SUSPENSION INTRA-ARTICULAR; INTRALESIONAL; INTRAMUSCULAR; SOFT TISSUE at 10:00

## 2024-10-22 RX ADMIN — LIDOCAINE HYDROCHLORIDE 5 ML: 10 INJECTION, SOLUTION EPIDURAL; INFILTRATION; INTRACAUDAL; PERINEURAL at 10:00

## 2024-10-22 NOTE — ASSESSMENT & PLAN NOTE
Findings consistent with left shoulder primary osteoarthritis.  Discussed findings and treatment options with the patient.  I review patient's left shoulder x-ray and radiologist report with him.  I discussed prognosis of his shoulder condition.  Explained to the patient that he has moderate to severe glenohumeral osteoarthritis.  Explained that the patient aggravated his arthritis during his fall and catch of himself.  Recommended a cortisone shot of the left GH joint and a course of physical therapy for left shoulder rotator cuff and scapular stabilizer strengthening and ROM.  A prescription was provided for physical therapy.  Instructed the patient he should follow-up if his pain is not improved in 2 to 3 months.  All patient's questions were answered to Fritz's satisfaction.  This note is created using dictation transcription.  It may contain typographical errors, grammatical errors, improperly dictated words, background noise and other errors.

## 2024-10-22 NOTE — PROGRESS NOTES
Assessment:     1. Primary osteoarthritis of left shoulder    2. Acute pain of left shoulder        Plan:     Problem List Items Addressed This Visit          Musculoskeletal and Integument    Primary osteoarthritis of left shoulder - Primary     Findings consistent with left shoulder primary osteoarthritis.  Discussed findings and treatment options with the patient.  I review patient's left shoulder x-ray and radiologist report with him.  I discussed prognosis of his shoulder condition.  Explained to the patient that he has moderate to severe glenohumeral osteoarthritis.  Explained that the patient aggravated his arthritis during his fall and catch of himself.  Recommended a cortisone shot of the left GH joint and a course of physical therapy for left shoulder rotator cuff and scapular stabilizer strengthening and ROM.  A prescription was provided for physical therapy.  Instructed the patient he should follow-up if his pain is not improved in 2 to 3 months.  All patient's questions were answered to Fritz's satisfaction.  This note is created using dictation transcription.  It may contain typographical errors, grammatical errors, improperly dictated words, background noise and other errors.         Relevant Orders    Large joint arthrocentesis: L subacromial bursa    Ambulatory Referral to Physical Therapy     Other Visit Diagnoses       Acute pain of left shoulder        Relevant Orders    Ambulatory Referral to Physical Therapy           Subjective:     Patient ID: Fritz Colón is a 73 y.o. male.  Chief Complaint:  Patient is a 73-year-old right-hand-dominant male who presents for consultation of left shoulder pain referred by the emergency department, Dr. Lo.  Patient reports approximately 2 weeks ago he was walking down the stairs when he slipped and grabbed a railing to catch himself and his arm pulled back into extension with pain along the posterior and lateral aspect of the shoulder.  Initially patient  did not have much pain in the shoulder but gradually the pain developed.  Patient presented to the ED on 10/14/2024.  He had x-rays which demonstrated concern for an avulsion of the greater tuberosity versus rotator cuff injury.  He was placed in a sling and instructed to follow-up with orthopedics.  Today patient reports constant posterior shoulder pain that radiates into the lateral aspect of the shoulder.  Patient denies neck pain or any distal numbness or tingling.  Pain worsens with getting dressed, sleeping, overhead movements, and extension of the shoulder.  He is using Voltaren gel and Tylenol for pain as needed.  No prior injuries or surgeries to the left shoulder. Patient takes hydrocodone for history of 3 lumbar laminectomies.  He has history of right total hip and left total knee.    Allergy:  Allergies   Allergen Reactions    Mushroom Extract Complex - Food Allergy Anaphylaxis    Nitroglycerin Vomiting and Headache     Medications:  all current active meds have been reviewed  Past Medical History:  Past Medical History:   Diagnosis Date    Arthritis     Cervical disc disease     C2-7, needs repair    Coronary artery disease     angina is pain in throat    Susanna Hernandez infection     in 40s with liver affects    Hypertension     Kidney stone      Past Surgical History:  Past Surgical History:   Procedure Laterality Date    ANKLE ARTHROSCOPY Bilateral     APPENDECTOMY      CATARACT EXTRACTION, BILATERAL Bilateral     CHOLECYSTECTOMY      CORONARY ANGIOPLASTY WITH STENT PLACEMENT      2 stents in LAD    ELBOW ARTHROSCOPY Bilateral     with right elbow tear repaired    ERCP      KNEE ARTHROSCOPY Right     LITHOTRIPSY      LUMBAR LAMINECTOMY      5 times    MULTIPLE TOOTH EXTRACTIONS      NOSE SURGERY  2021    fracture post a fall    DC ARTHRP ACETBLR/PROX FEM PROSTC AGRFT/ALGRFT Right 11/09/2022    Procedure: ARTHROPLASTY HIP TOTAL ANTERIOR,NAVIGATED- SAME DAY;  Surgeon: Servando Levi DO;  Location:  "[facility];  Service: Orthopedics    REPLACEMENT TOTAL KNEE Left     SINUS SURGERY      THUMB FUSION Bilateral     thumb repair with bone grafts    TONSILLECTOMY AND ADENOIDECTOMY       Family History:  Family History   Problem Relation Age of Onset    Uterine cancer Mother     Colon cancer Father     Cancer Sister         corneal    No Known Problems Brother      Social History:  Social History     Substance and Sexual Activity   Alcohol Use Yes    Comment: Maybe 1 drink every 3-4 weeks     Social History     Substance and Sexual Activity   Drug Use Never     Social History     Tobacco Use   Smoking Status Former    Current packs/day: 1.50    Average packs/day: 1.5 packs/day for 25.0 years (37.5 ttl pk-yrs)    Types: Cigarettes   Smokeless Tobacco Never   Tobacco Comments    Quit at age 27     Review of Systems   Constitutional:  Negative for chills and fever.   HENT:  Negative for ear pain and sore throat.    Eyes:  Negative for pain and visual disturbance.   Respiratory:  Negative for cough and shortness of breath.    Cardiovascular:  Negative for chest pain and palpitations.   Gastrointestinal:  Negative for abdominal pain and vomiting.   Genitourinary:  Negative for dysuria and hematuria.   Musculoskeletal:  Positive for arthralgias (Left shoulder). Negative for back pain and neck pain.   Skin:  Negative for color change and rash.   Neurological:  Negative for seizures and syncope.   Psychiatric/Behavioral: Negative.     All other systems reviewed and are negative.        Objective:  BP Readings from Last 1 Encounters:   10/22/24 142/84      Wt Readings from Last 1 Encounters:   10/22/24 90.3 kg (199 lb)      BMI:   Estimated body mass index is 28.55 kg/m² as calculated from the following:    Height as of this encounter: 5' 10\" (1.778 m).    Weight as of this encounter: 90.3 kg (199 lb).  BSA:   Estimated body surface area is 2.08 meters squared as calculated from the following:    Height as of this encounter: " "5' 10\" (1.778 m).    Weight as of this encounter: 90.3 kg (199 lb).   Physical Exam  Vitals and nursing note reviewed.   Constitutional:       Appearance: Normal appearance. He is well-developed.   HENT:      Head: Normocephalic and atraumatic.      Right Ear: External ear normal.      Left Ear: External ear normal.      Nose: Nose normal.   Eyes:      Extraocular Movements: Extraocular movements intact.      Conjunctiva/sclera: Conjunctivae normal.   Pulmonary:      Effort: Pulmonary effort is normal.   Musculoskeletal:      Cervical back: Neck supple.   Skin:     General: Skin is warm and dry.   Neurological:      Mental Status: He is alert and oriented to person, place, and time.   Psychiatric:         Mood and Affect: Mood normal.         Behavior: Behavior normal.       Left Shoulder Exam     Tenderness   Left shoulder tenderness location: Anterior.    Range of Motion   Active abduction:  140 (Pain)   Passive abduction:  normal   Forward flexion:  160 (Pain)     Muscle Strength   Abduction: 5/5   Internal rotation: 5/5   External rotation: 5/5   Biceps: 5/5     Tests   Cross arm: negative  Drop arm: negative    Other   Erythema: absent  Sensation: normal  Pulse: present             I have personally reviewed pertinent films in PACS and my interpretation is: X-rays of the left shoulder from 10/14/2024 demonstrate moderate-severe glenohumeral and mild AC osteoarthritis.  No obvious fracture is seen.    Large joint arthrocentesis: L glenohumeral  Universal Protocol:  procedure performed by consultantConsent: Verbal consent obtained.  Risks and benefits: risks, benefits and alternatives were discussed  Consent given by: patient  Timeout called at: 10/22/2024 10:43 AM.  Patient understanding: patient states understanding of the procedure being performed  Patient identity confirmed: verbally with patient  Supporting Documentation  Indications: pain   Procedure Details  Location: shoulder - L glenohumeral  Needle " size: 22 G  Ultrasound guidance: no  Approach: lateral  Medications administered: 5 mL lidocaine (PF) 1 %; 6 mg betamethasone acetate-betamethasone sodium phosphate 6 (3-3) mg/mL    Patient tolerance: patient tolerated the procedure well with no immediate complications  Dressing:  Sterile dressing applied

## 2024-10-30 RX ORDER — NEBIVOLOL 2.5 MG/1
2.5 TABLET ORAL DAILY
COMMUNITY
Start: 2024-10-02

## 2024-11-13 ENCOUNTER — VBI (OUTPATIENT)
Dept: ADMINISTRATIVE | Facility: OTHER | Age: 74
End: 2024-11-13

## 2024-11-13 NOTE — TELEPHONE ENCOUNTER
11/13/24 7:39 AM     Chart reviewed for BP ; nothing is submitted to the patient's insurance at this time.     Hossein Vale MA   [facility]

## 2024-11-19 DIAGNOSIS — Z79.4 TYPE 2 DIABETES MELLITUS WITH HYPERGLYCEMIA, WITH LONG-TERM CURRENT USE OF INSULIN (HCC): ICD-10-CM

## 2024-11-19 DIAGNOSIS — E78.2 HYPERCHOLESTEROLEMIA WITH HYPERTRIGLYCERIDEMIA: ICD-10-CM

## 2024-11-19 DIAGNOSIS — E11.65 TYPE 2 DIABETES MELLITUS WITH HYPERGLYCEMIA, WITH LONG-TERM CURRENT USE OF INSULIN (HCC): ICD-10-CM

## 2024-11-19 RX ORDER — ICOSAPENT ETHYL 1 G/1
2 CAPSULE ORAL 2 TIMES DAILY
Qty: 360 CAPSULE | Refills: 1 | Status: SHIPPED | OUTPATIENT
Start: 2024-11-19

## 2024-11-19 RX ORDER — INSULIN ASPART 100 [IU]/ML
INJECTION, SOLUTION INTRAVENOUS; SUBCUTANEOUS
Qty: 60 ML | Refills: 1 | Status: SHIPPED | OUTPATIENT
Start: 2024-11-19

## 2024-11-19 NOTE — TELEPHONE ENCOUNTER
Yessenia from [facility] calling in regards to faxed scripts they received for patient's       Icosapent Ethyl (Vascepa) 1 g CAPS      And     NovoLOG 100 UNIT/ML injection     She states faxed scripts are missing a signature from the provider and on the form there is are boxes that say that this refill is approved or denied.     She needs the approved box to be checked in order to refill the patients mediation.     I was unable to find any documentation of the faxed prescriptions.     She is asking if we can check the approved box and sign the scripts ASAP.     Please advise

## 2024-11-19 NOTE — ADDENDUM NOTE
Addended by: RALF LUIS on: 11/19/2024 04:18 PM     Modules accepted: Orders    
[Negative] : Heme/Lymph

## 2024-12-02 ENCOUNTER — DOCUMENTATION (OUTPATIENT)
Dept: ENDOCRINOLOGY | Facility: HOSPITAL | Age: 74
End: 2024-12-02

## (undated) DEVICE — SUT VICRYL 0 CT-1 27 IN J260H

## (undated) DEVICE — HOOD: Brand: FLYTE, SURGICOOL

## (undated) DEVICE — 450 ML BOTTLE OF 0.05% CHLORHEXIDINE GLUCONATE IN 99.95% STERILE WATER FOR IRRIGATION, USP AND APPLICATOR.: Brand: IRRISEPT ANTIMICROBIAL WOUND LAVAGE

## (undated) DEVICE — SUT VICRYL 2-0 CT-1 27 IN J259H

## (undated) DEVICE — DRESSING MEPILEX AG BORDER 4 X 8 IN

## (undated) DEVICE — SUT STRATAFIX SPIRAL 2-0 CT-1 30 CM SXPP1B410

## (undated) DEVICE — CAPIT HIP COP - ACTIS ONLY

## (undated) DEVICE — SUT STRATAFIX SPIRAL 1 CT-1 24 X 24CM SXPL2B400

## (undated) DEVICE — DRAPE C-ARM X-RAY

## (undated) DEVICE — 3M™ STERI-DRAPE™ U-DRAPE 1015: Brand: STERI-DRAPE™

## (undated) DEVICE — BLADE SAW 25MM WIDTH

## (undated) DEVICE — NEEDLE 18 G X 1 1/2

## (undated) DEVICE — INTENDED FOR TISSUE SEPARATION, AND OTHER PROCEDURES THAT REQUIRE A SHARP SURGICAL BLADE TO PUNCTURE OR CUT.: Brand: BARD-PARKER ® CARBON RIB-BACK BLADES

## (undated) DEVICE — SUT STRATAFIX SPIRAL MONOCRYL PLUS 4-0 PS-2 30CM SXMP1B117

## (undated) DEVICE — GLOVE SRG BIOGEL ORTHOPEDIC 8.5

## (undated) DEVICE — HEAVY DUTY TABLE COVER: Brand: CONVERTORS

## (undated) DEVICE — PLUMEPEN PRO 10FT

## (undated) DEVICE — MAYO STAND COVER: Brand: CONVERTORS

## (undated) DEVICE — BIOLOX DELTA CERAMIC FEMORAL HEAD +1.5 36MM DIA 12/14 TAPER
Type: IMPLANTABLE DEVICE | Site: HIP | Status: NON-FUNCTIONAL
Brand: BIOLOX DELTA

## (undated) DEVICE — ADHESIVE SKIN CLOSR DERMABOND PRINEO

## (undated) DEVICE — BLADE OSCILLATING 25.4 X 79.5MM 1.24MMTHK LRG BONE

## (undated) DEVICE — ELECTRODE BLADE MOD  E-Z CLEAN 6.5IN -0014M

## (undated) DEVICE — MEDI-VAC YANKAUER SUCTION HANDLE W/STRAIGHT TIP & CONTROL VENT: Brand: CARDINAL HEALTH

## (undated) DEVICE — SCD SEQUENTIAL COMPRESSION COMFORT SLEEVE MEDIUM KNEE LENGTH: Brand: KENDALL SCD

## (undated) DEVICE — WEBRIL 6 IN UNSTERILE

## (undated) DEVICE — SURGICAL GOWN, XL SMARTSLEEVE: Brand: CONVERTORS

## (undated) DEVICE — 6617 IOBAN II PATIENT ISOLATION DRAPE 5/BX,4BX/CS: Brand: STERI-DRAPE™ IOBAN™ 2

## (undated) DEVICE — BETHLEHEM TOTAL HIP, KIT: Brand: CARDINAL HEALTH

## (undated) DEVICE — ARTHROSCOPY FLOOR MAT

## (undated) DEVICE — U-DRAPE: Brand: CONVERTORS

## (undated) DEVICE — THE SIMPULSE SOLO SYSTEM WITH ULTREX RETRACTABLE SPLASH SHIELD TIP: Brand: SIMPULSE SOLO

## (undated) DEVICE — GLOVE INDICATOR PI UNDERGLOVE SZ 8.5 BLUE

## (undated) DEVICE — NEEDLE HYPO 22G X 1-1/2 IN

## (undated) DEVICE — COBAN 6 IN STERILE

## (undated) DEVICE — SYRINGE 50ML LL